# Patient Record
Sex: FEMALE | Race: WHITE | NOT HISPANIC OR LATINO | Employment: UNEMPLOYED | URBAN - NONMETROPOLITAN AREA
[De-identification: names, ages, dates, MRNs, and addresses within clinical notes are randomized per-mention and may not be internally consistent; named-entity substitution may affect disease eponyms.]

---

## 2021-12-22 ENCOUNTER — OFFICE VISIT (OUTPATIENT)
Dept: OBSTETRICS AND GYNECOLOGY | Facility: CLINIC | Age: 31
End: 2021-12-22

## 2021-12-22 VITALS
SYSTOLIC BLOOD PRESSURE: 102 MMHG | HEIGHT: 64 IN | BODY MASS INDEX: 25.27 KG/M2 | DIASTOLIC BLOOD PRESSURE: 68 MMHG | WEIGHT: 148 LBS

## 2021-12-22 DIAGNOSIS — E66.3 OVERWEIGHT (BMI 25.0-29.9): ICD-10-CM

## 2021-12-22 DIAGNOSIS — Z78.9 NON-SMOKER: ICD-10-CM

## 2021-12-22 DIAGNOSIS — Z01.419 ENCOUNTER FOR GYNECOLOGICAL EXAMINATION WITHOUT ABNORMAL FINDING: Primary | ICD-10-CM

## 2021-12-22 PROCEDURE — 87624 HPV HI-RISK TYP POOLED RSLT: CPT | Performed by: NURSE PRACTITIONER

## 2021-12-22 PROCEDURE — 99385 PREV VISIT NEW AGE 18-39: CPT | Performed by: NURSE PRACTITIONER

## 2021-12-22 PROCEDURE — G0123 SCREEN CERV/VAG THIN LAYER: HCPCS | Performed by: NURSE PRACTITIONER

## 2021-12-22 RX ORDER — AZITHROMYCIN 250 MG/1
250 TABLET, FILM COATED ORAL DAILY
COMMUNITY
End: 2022-12-22 | Stop reason: HOSPADM

## 2021-12-22 RX ORDER — NEOMYCIN SULFATE, POLYMYXIN B SULFATE AND HYDROCORTISONE 10; 3.5; 1 MG/ML; MG/ML; [USP'U]/ML
SUSPENSION/ DROPS AURICULAR (OTIC)
COMMUNITY
End: 2022-12-22 | Stop reason: HOSPADM

## 2021-12-22 NOTE — PATIENT INSTRUCTIONS
"BMI for Adults  What is BMI?  Body mass index (BMI) is a number that is calculated from a person's weight and height. BMI can help estimate how much of a person's weight is composed of fat. BMI does not measure body fat directly. Rather, it is an alternative to procedures that directly measure body fat, which can be difficult and expensive.  BMI can help identify people who may be at higher risk for certain medical problems.  What are BMI measurements used for?  BMI is used as a screening tool to identify possible weight problems. It helps determine whether a person is obese, overweight, a healthy weight, or underweight.  BMI is useful for:  · Identifying a weight problem that may be related to a medical condition or may increase the risk for medical problems.  · Promoting changes, such as changes in diet and exercise, to help reach a healthy weight. BMI screening can be repeated to see if these changes are working.  How is BMI calculated?  BMI involves measuring your weight in relation to your height. Both height and weight are measured, and the BMI is calculated from those numbers. This can be done either in English (U.S[place] or metric measurements. Note that charts and online BMI calculators are available to help you find your BMI quickly and easily without having to do these calculations yourself.  To calculate your BMI in English (U.S.) measurements:    1. Measure your weight in pounds (lb).  2. Multiply the number of pounds by 703.  ? For example, for a person who weighs 180 lb, multiply that number by 703, which equals 126,540.  3. Measure your height in inches. Then multiply that number by itself to get a measurement called \"inches squared.\"  ? For example, for a person who is 70 inches tall, the \"inches squared\" measurement is 70 inches x 70 inches, which equals 4,900 inches squared.  4. Divide the total from step 2 (number of lb x 703) by the total from step 3 (inches squared): 126,540 ÷ 4,900 = 25.8. This is " "your BMI.    To calculate your BMI in metric measurements:  1. Measure your weight in kilograms (kg).  2. Measure your height in meters (m). Then multiply that number by itself to get a measurement called \"meters squared.\"  ? For example, for a person who is 1.75 m tall, the \"meters squared\" measurement is 1.75 m x 1.75 m, which is equal to 3.1 meters squared.  3. Divide the number of kilograms (your weight) by the meters squared number. In this example: 70 ÷ 3.1 = 22.6. This is your BMI.  What do the results mean?  BMI charts are used to identify whether you are underweight, normal weight, overweight, or obese. The following guidelines will be used:  · Underweight: BMI less than 18.5.  · Normal weight: BMI between 18.5 and 24.9.  · Overweight: BMI between 25 and 29.9.  · Obese: BMI of 30 or above.  Keep these notes in mind:  · Weight includes both fat and muscle, so someone with a muscular build, such as an athlete, may have a BMI that is higher than 24.9. In cases like these, BMI is not an accurate measure of body fat.  · To determine if excess body fat is the cause of a BMI of 25 or higher, further assessments may need to be done by a health care provider.  · BMI is usually interpreted in the same way for men and women.  Where to find more information  For more information about BMI, including tools to quickly calculate your BMI, go to these websites:  · Centers for Disease Control and Prevention: www.cdc.gov  · American Heart Association: www.heart.org  · National Heart, Lung, and Blood [employer]: www.nhlbi.nih.gov  Summary  · Body mass index (BMI) is a number that is calculated from a person's weight and height.  · BMI may help estimate how much of a person's weight is composed of fat. BMI can help identify those who may be at higher risk for certain medical problems.  · BMI can be measured using English measurements or metric measurements.  · BMI charts are used to identify whether you are underweight, normal " weight, overweight, or obese.  This information is not intended to replace advice given to you by your health care provider. Make sure you discuss any questions you have with your health care provider.  Document Revised: 09/09/2020 Document Reviewed: 07/17/2020  Elsevier Patient Education © 2021 [employer] Inc.

## 2021-12-22 NOTE — PROGRESS NOTES
"Subjective   Cassandra Del Cid is a 31 y.o. female.     Annual exam      The following portions of the patient's history were reviewed and updated as appropriate: allergies, current medications, past family history, past medical history, past social history, past surgical history and problem list.    /68   Ht 162.6 cm (64\")   Wt 67.1 kg (148 lb)   LMP 11/18/2021   BMI 25.40 kg/m²     Review of Systems   Constitutional: Negative for activity change, appetite change, fatigue and fever.   HENT: Negative for congestion, sore throat and trouble swallowing.    Eyes: Negative for pain, discharge and visual disturbance.   Respiratory: Negative for apnea, shortness of breath and wheezing.    Cardiovascular: Negative for chest pain, palpitations and leg swelling.   Gastrointestinal: Negative for abdominal pain, constipation and diarrhea.   Genitourinary: Negative for frequency, menstrual problem, pelvic pain, urgency and vaginal discharge.        Monthly  5 day, 3 heavy  No cramping or clotting.    Musculoskeletal: Negative for back pain and gait problem.   Skin: Negative for color change and rash.   Neurological: Negative for dizziness, weakness and numbness.   Psychiatric/Behavioral: Negative for confusion and sleep disturbance.       Objective   Physical Exam  Vitals and nursing note reviewed. Exam conducted with a chaperone present.   Constitutional:       General: She is not in acute distress.     Appearance: She is well-developed. She is not diaphoretic.   HENT:      Head: Normocephalic.      Right Ear: External ear normal.      Left Ear: External ear normal.      Nose: Nose normal.   Eyes:      General: No scleral icterus.        Right eye: No discharge.         Left eye: No discharge.      Conjunctiva/sclera: Conjunctivae normal.      Pupils: Pupils are equal, round, and reactive to light.   Neck:      Thyroid: No thyromegaly.      Vascular: No carotid bruit.      Trachea: No tracheal deviation. "   Cardiovascular:      Rate and Rhythm: Normal rate and regular rhythm.      Heart sounds: Normal heart sounds. No murmur heard.      Pulmonary:      Effort: Pulmonary effort is normal. No respiratory distress.      Breath sounds: Normal breath sounds. No wheezing.   Chest:   Breasts: Breasts are symmetrical.      Right: Normal. No swelling, bleeding, inverted nipple, mass, nipple discharge, skin change, tenderness, axillary adenopathy or supraclavicular adenopathy.      Left: Normal. No swelling, bleeding, inverted nipple, mass, nipple discharge, skin change, tenderness, axillary adenopathy or supraclavicular adenopathy.       Abdominal:      General: There is no distension.      Palpations: Abdomen is soft. There is no mass.      Tenderness: There is no abdominal tenderness. There is no right CVA tenderness, left CVA tenderness or guarding.      Hernia: No hernia is present. There is no hernia in the left inguinal area or right inguinal area.   Genitourinary:     General: Normal vulva.      Exam position: Lithotomy position.      Labia:         Right: No rash, tenderness, lesion or injury.         Left: No rash, tenderness, lesion or injury.       Vagina: Normal. No signs of injury and foreign body. No vaginal discharge, erythema, tenderness or bleeding.      Cervix: Normal.      Uterus: Normal. Not enlarged, not fixed and not tender.       Adnexa: Right adnexa normal and left adnexa normal.        Right: No mass, tenderness or fullness.          Left: No mass, tenderness or fullness.        Rectum: Normal. No mass.      Comments:   BSU normal  Urethral meatus  Normal  Perineum  Normal  Musculoskeletal:         General: No tenderness. Normal range of motion.      Cervical back: Normal range of motion and neck supple.   Lymphadenopathy:      Head:      Right side of head: No submental, submandibular, tonsillar, preauricular, posterior auricular or occipital adenopathy.      Left side of head: No submental,  submandibular, tonsillar, preauricular, posterior auricular or occipital adenopathy.      Cervical: No cervical adenopathy.      Right cervical: No superficial, deep or posterior cervical adenopathy.     Left cervical: No superficial, deep or posterior cervical adenopathy.      Upper Body:      Right upper body: No supraclavicular, axillary or pectoral adenopathy.      Left upper body: No supraclavicular, axillary or pectoral adenopathy.      Lower Body: No right inguinal adenopathy. No left inguinal adenopathy.   Skin:     General: Skin is warm and dry.      Findings: No bruising, erythema or rash.   Neurological:      Mental Status: She is alert and oriented to person, place, and time.      Coordination: Coordination normal.   Psychiatric:         Mood and Affect: Mood normal.         Behavior: Behavior normal.         Thought Content: Thought content normal.         Judgment: Judgment normal.         Assessment/Plan   Well woman GYN exam.   Pap smear done per ASCCP guidelines.   Will have lab work at PCP.     Encouraged SBE, pt is aware how to do self breast exam and the importance of same.   Discussed weight management and importance of maintaining a healthy weight.   Discussed Vitamin D intake and the importance of adequate vitamin D for both Bone Health and a healthy immune system.    Discussed Daily exercise and the importance of same, in regards to a healthy heart as well as helping to maintain her weight and improving her mental health.     Discussed STD prevention and testing.   Pt declines STD testing.     Desires pregnancy  Start PNV.   Discussed menstrual cycles.   Pt advised to call with any questions or concerns.   Discussed S&S to report. Pt voiced understanding.       Patient's Body mass index is 25.4 kg/m². indicating that she is overweight (BMI 25-29.9). Obesity-related health conditions include the following: none. Obesity is unchanged. BMI is is above average; no BMI management plan is  appropriate. We discussed portion control and increasing exercise..    RV annual exam/prn.   Diagnoses and all orders for this visit:    1. Encounter for gynecological examination without abnormal finding (Primary)  -     Liquid-based Pap Smear, Screening    2. Non-smoker    3. Overweight (BMI 25.0-29.9)

## 2021-12-23 LAB
GEN CATEG CVX/VAG CYTO-IMP: NORMAL
HPV I/H RISK 4 DNA CVX QL PROBE+SIG AMP: NOT DETECTED
LAB AP CASE REPORT: NORMAL
LAB AP GYN ADDITIONAL INFORMATION: NORMAL
LAB AP GYN OTHER FINDINGS: NORMAL
PATH INTERP SPEC-IMP: NORMAL
STAT OF ADQ CVX/VAG CYTO-IMP: NORMAL

## 2022-07-12 ENCOUNTER — OFFICE VISIT (OUTPATIENT)
Dept: OBGYN CLINIC | Age: 32
End: 2022-07-12
Payer: COMMERCIAL

## 2022-07-12 VITALS
HEART RATE: 81 BPM | DIASTOLIC BLOOD PRESSURE: 72 MMHG | SYSTOLIC BLOOD PRESSURE: 109 MMHG | BODY MASS INDEX: 27.29 KG/M2 | WEIGHT: 154 LBS | HEIGHT: 63 IN

## 2022-07-12 DIAGNOSIS — Z76.89 ENCOUNTER TO ESTABLISH CARE: Primary | ICD-10-CM

## 2022-07-12 DIAGNOSIS — Z36.89 CONFIRM FETAL CARDIAC ACTIVITY USING ULTRASOUND: ICD-10-CM

## 2022-07-12 DIAGNOSIS — Z34.90 PREGNANCY, UNSPECIFIED GESTATIONAL AGE: ICD-10-CM

## 2022-07-12 PROCEDURE — 99203 OFFICE O/P NEW LOW 30 MIN: CPT | Performed by: NURSE PRACTITIONER

## 2022-07-12 ASSESSMENT — ENCOUNTER SYMPTOMS
EYES NEGATIVE: 1
GASTROINTESTINAL NEGATIVE: 1
RESPIRATORY NEGATIVE: 1

## 2022-07-12 NOTE — PROGRESS NOTES
Terrie Avendano is a 36 y.o. female who presents today for her medical conditions/ complaints as noted below. Terrie Avendano is c/o of New Patient and Confirmation        HPI  Pt presents today as a new patient for pregnancy confirmation. Third pregnancy, no preference on delivery   Positive pregnancy test in office. This is her 3rd pregnancy. She had 2 vaginal deliveries and no complications in the pregnancy but with her second had retained placenta and \"hemorrhaged\". Current on her pap. Patient's last menstrual period was 05/26/2022. [id number]    History reviewed. No pertinent past medical history. Past Surgical History:   Procedure Laterality Date    APPENDECTOMY  10/14/2011    laparoscopic     Family History   Problem Relation Age of Onset    Diabetes Paternal Grandfather     Cancer Paternal Grandfather         skin cancers     Social History     Tobacco Use    Smoking status: Never Smoker    Smokeless tobacco: Never Used   Substance Use Topics    Alcohol use: No       No current outpatient medications on file. No current facility-administered medications for this visit. Allergies   Allergen Reactions    Penicillins Nausea And Vomiting     Vitals:    07/12/22 0912   BP: 109/72   Pulse: 81     Body mass index is 27.28 kg/m². Review of Systems   Constitutional: Negative. HENT: Negative. Eyes: Negative. Respiratory: Negative. Cardiovascular: Negative. Gastrointestinal: Negative. Genitourinary: Negative for difficulty urinating, dyspareunia, dysuria, enuresis, frequency, hematuria, menstrual problem, pelvic pain, urgency and vaginal discharge. Musculoskeletal: Negative. Skin: Negative. Neurological: Negative. Psychiatric/Behavioral: Negative. Physical Exam  Vitals and nursing note reviewed. Constitutional:       General: She is not in acute distress. Appearance: She is well-developed. She is not diaphoretic.    HENT:      Head: Normocephalic and atraumatic. Eyes:      Conjunctiva/sclera: Conjunctivae normal.      Pupils: Pupils are equal, round, and reactive to light. Pulmonary:      Effort: Pulmonary effort is normal.   Abdominal:      Tenderness: There is no guarding. Musculoskeletal:         General: Normal range of motion. Cervical back: Normal range of motion. Comments: Normal ROM in all 4 extremities; normal gait   Skin:     General: Skin is warm and dry. Neurological:      Mental Status: She is alert and oriented to person, place, and time. Motor: No abnormal muscle tone. Coordination: Coordination normal.   Psychiatric:         Behavior: Behavior normal.          Diagnosis Orders   1. Encounter to establish care     2. Pregnancy, unspecified gestational age     1. Confirm fetal cardiac activity using ultrasound  US OB TRANSVAGINAL       MEDICATIONS:  No orders of the defined types were placed in this encounter. ORDERS:  Orders Placed This Encounter   Procedures    US OB TRANSVAGINAL       PLAN:  Pregnancy recommendations discussed  Will set up US and nob in 1-2 weeks  She is on pnv  Plan of care was discussed with patient. Patient was encouraged to adhere to a well-balanced diet, including increasing water intake and limiting excessive caffeine and salt. The benefits of exercise were discussed; however she was advised against heavy lifting, sit-ups and abdominal crunches. A list of safe OTC medications was provided and discussed. The patient was cautioned against the use of tanning beds, hot tubs, saunas, and x-rays. Avoidance of tobacco, alcohol and illicit drugs was also discussed due to harmful effects on the fetus and increased risks associated with pregnancy. Certain labs and ultrasounds are required at certain times during pregnancy but others are optional, including the serum integrated screen/[place]/AFP/ Panorama, and other genetic testing.   The patient was encouraged to attend childbirth classes and general hospital information was provided based on patients hospital of choice. Over 50% of the total visit time of 35 minutes was spent on counseling and/or coordination of care. All questions were answered and the patient voiced understanding. There are no Patient Instructions on file for this visit.

## 2022-07-21 ENCOUNTER — HOSPITAL ENCOUNTER (OUTPATIENT)
Dept: ULTRASOUND IMAGING | Age: 32
Discharge: HOME | End: 2022-07-21
Payer: COMMERCIAL

## 2022-07-21 DIAGNOSIS — Z36.89 CONFIRM FETAL CARDIAC ACTIVITY USING ULTRASOUND: ICD-10-CM

## 2022-07-21 PROCEDURE — 76817 TRANSVAGINAL US OBSTETRIC: CPT | Performed by: RADIOLOGY

## 2022-07-21 PROCEDURE — 76817 TRANSVAGINAL US OBSTETRIC: CPT

## 2022-07-27 ENCOUNTER — INITIAL PRENATAL (OUTPATIENT)
Dept: OBGYN CLINIC | Age: 32
End: 2022-07-27

## 2022-07-27 VITALS
SYSTOLIC BLOOD PRESSURE: 106 MMHG | WEIGHT: 153 LBS | HEART RATE: 76 BPM | DIASTOLIC BLOOD PRESSURE: 70 MMHG | BODY MASS INDEX: 27.1 KG/M2

## 2022-07-27 DIAGNOSIS — Z36.9 ANTENATAL SCREENING ENCOUNTER: ICD-10-CM

## 2022-07-27 DIAGNOSIS — Z3A.09 9 WEEKS GESTATION OF PREGNANCY: ICD-10-CM

## 2022-07-27 DIAGNOSIS — Z3A.09 9 WEEKS GESTATION OF PREGNANCY: Primary | ICD-10-CM

## 2022-07-27 DIAGNOSIS — Z34.80 SUPERVISION OF OTHER NORMAL PREGNANCY: ICD-10-CM

## 2022-07-27 DIAGNOSIS — O21.9 NAUSEA AND VOMITING IN PREGNANCY: ICD-10-CM

## 2022-07-27 LAB
ABO/RH: NORMAL
ANTIBODY IDENTIFICATION: NORMAL
ANTIBODY SCREEN: NORMAL
HEPATITIS C ANTIBODY INTERPRETATION: NORMAL

## 2022-07-27 PROCEDURE — 0500F INITIAL PRENATAL CARE VISIT: CPT | Performed by: NURSE PRACTITIONER

## 2022-07-27 RX ORDER — ONDANSETRON 4 MG/1
4 TABLET, ORALLY DISINTEGRATING ORAL EVERY 8 HOURS PRN
Qty: 45 TABLET | Refills: 3 | Status: SHIPPED | OUTPATIENT
Start: 2022-07-27

## 2022-07-27 NOTE — PROGRESS NOTES
Patient presents today for initial ob visit. Pt denies any vaginal leaking bleeding or contractions. Patient never got zofran rx, asking for it to be sent to [facility]  S:G[facility] So Doctor is here for a new obstetrical visit. Today she is 9w4d weeks EGA. She is doing well but is having some nausea. This is her 3rd pregnancy. Will be interested in [facility]. Dating confirmed. She  does not have vaginal bleeding, leaking of fluid, contractions. She does not have blurred vision, SOB, or increased swelling in legs or face. Pt does not feel fetal movement regularly. O:   Vitals:    22 1358   BP: 106/70   Pulse: 76   Weight: 153 lb (69.4 kg)     Pt is A&Ox3, in no acute distress. Normocephalic, atraumatic. PERRL. Resp even and non-labored. Skin pink, warm & dry. Gravid abdomen. GUILLAUME's well. Gait steady. See OB flowsheet. A: Normal IUP at 9w4d wks      Diagnosis Orders   1. 9 weeks gestation of pregnancy  HIV Obstetric Panel    Hemoglobinopathy Evaluation    Culture, Urine    Varicella Zoster Antibody, IgG    Hepatitis C Antibody    Chlamydia/N. Gonorrhoeae/T. Vaginalis      2. Supervision of other normal pregnancy        3.  screening encounter  HIV Obstetric Panel    Hemoglobinopathy Evaluation    Culture, Urine    Varicella Zoster Antibody, IgG    Hepatitis C Antibody    Chlamydia/N. Gonorrhoeae/T. Vaginalis      4. Nausea and vomiting in pregnancy  ondansetron (ZOFRAN ODT) 4 MG disintegrating tablet          P:   Pt counseled on pregnancy recommendations and Genetic testing   Plan of care was discussed with patient. Patient was encouraged to adhere to a well-balanced diet, including increasing water intake and limiting excessive caffeine and salt. The benefits of exercise were discussed; however she was advised against heavy lifting, sit-ups and abdominal crunches. A list of safe OTC medications was provided and discussed.   The patient was cautioned against the use of tanning beds, hot tubs, saunas, and x-rays. Avoidance of tobacco, alcohol and illicit drugs was also discussed due to harmful effects on the fetus and increased risks associated with pregnancy. Certain labs and ultrasounds are required at certain times during pregnancy but others are optional, including the serum integrated screen/[place]/AFP/ Panorama, and other genetic testing. The patient was encouraged to attend childbirth classes and general hospital information was provided based on patients hospital of choice. Continue with routine prenatal care. RTC in 4 wk for prenatal visit    MEDICATIONS:  Orders Placed This Encounter   Medications    ondansetron (ZOFRAN ODT) 4 MG disintegrating tablet     Sig: Take 1 tablet by mouth every 8 hours as needed for Nausea or Vomiting     Dispense:  45 tablet     Refill:  3       ORDERS:  Orders Placed This Encounter   Procedures    Culture, Urine    Chlamydia/N. Gonorrhoeae/T. Vaginalis    HIV Obstetric Panel    Hemoglobinopathy Evaluation    Varicella Zoster Antibody, IgG    Hepatitis C Antibody

## 2022-07-28 LAB
BASOPHILS ABSOLUTE: 0 K/UL (ref 0–0.2)
BASOPHILS RELATIVE PERCENT: 0.4 % (ref 0–1)
C. TRACHOMATIS DNA ,URINE: NOT DETECTED
EOSINOPHILS ABSOLUTE: 0 K/UL (ref 0–0.6)
EOSINOPHILS RELATIVE PERCENT: 0.4 % (ref 0–5)
HCT VFR BLD CALC: 39.1 % (ref 37–47)
HEMOGLOBIN: 12.9 G/DL (ref 12–16)
HEPATITIS B SURFACE ANTIGEN INTERPRETATION: ABNORMAL
HIV-1 P24 AG: ABNORMAL
IMMATURE GRANULOCYTES #: 0 K/UL
K ANTIGEN: NORMAL
LYMPHOCYTES ABSOLUTE: 1.3 K/UL (ref 1.1–4.5)
LYMPHOCYTES RELATIVE PERCENT: 11.4 % (ref 20–40)
MCH RBC QN AUTO: 32.3 PG (ref 27–31)
MCHC RBC AUTO-ENTMCNC: 33 G/DL (ref 33–37)
MCV RBC AUTO: 98 FL (ref 81–99)
MONOCYTES ABSOLUTE: 0.7 K/UL (ref 0–0.9)
MONOCYTES RELATIVE PERCENT: 5.8 % (ref 0–10)
N. GONORRHOEAE DNA, URINE: NOT DETECTED
NEUTROPHILS ABSOLUTE: 9.3 K/UL (ref 1.5–7.5)
NEUTROPHILS RELATIVE PERCENT: 81.6 % (ref 50–65)
PDW BLD-RTO: 11.8 % (ref 11.5–14.5)
PLATELET # BLD: 268 K/UL (ref 130–400)
PMV BLD AUTO: 10.1 FL (ref 9.4–12.3)
RAPID HIV 1&2: ABNORMAL
RBC # BLD: 3.99 M/UL (ref 4.2–5.4)
RPR: ABNORMAL
RUBELLA ANTIBODY IGG: REACTIVE
TRICHOMONAS VAGINALIS DNA, URINE: NOT DETECTED
WBC # BLD: 11.3 K/UL (ref 4.8–10.8)

## 2022-07-29 LAB — URINE CULTURE, ROUTINE: NORMAL

## 2022-07-30 LAB
HEMOGLOBIN A-1 QUANTITATION: 95.9 % (ref 95–97.9)
HEMOGLOBIN A2 QUANTITATION: 3.5 % (ref 2–3.5)
HEMOGLOBIN C QUANTITATION: 0 % (ref 0–0)
HEMOGLOBIN E QUANTITATION: 0 % (ref 0–0)
HEMOGLOBIN ELECTROPHORESIS: NORMAL
HEMOGLOBIN EVALUATION: NORMAL
HEMOGLOBIN F QUANTITATION: 0.6 % (ref 0–2.1)
HEMOGLOBIN OTHER: 0 % (ref 0–0)
HEMOGLOBIN S QUANTITATION: 0 % (ref 0–0)
SICKLE CELL: NORMAL

## 2022-08-01 DIAGNOSIS — O36.1910 KELL ISOIMMUNIZATION DURING PREGNANCY IN FIRST TRIMESTER, SINGLE OR UNSPECIFIED FETUS: ICD-10-CM

## 2022-08-01 DIAGNOSIS — O36.1910 MATERNAL ATYPICAL ANTIBODY AFFECTING PREGNANCY IN FIRST TRIMESTER, SINGLE OR UNSPECIFIED FETUS: Primary | ICD-10-CM

## 2022-08-01 LAB — VZV IGG SER QL IA: 3.16

## 2022-08-02 ENCOUNTER — ROUTINE PRENATAL (OUTPATIENT)
Dept: OBGYN CLINIC | Age: 32
End: 2022-08-02

## 2022-08-02 VITALS
BODY MASS INDEX: 27.63 KG/M2 | WEIGHT: 156 LBS | HEART RATE: 88 BPM | DIASTOLIC BLOOD PRESSURE: 66 MMHG | SYSTOLIC BLOOD PRESSURE: 126 MMHG

## 2022-08-02 DIAGNOSIS — Z36.9 ANTENATAL SCREENING ENCOUNTER: ICD-10-CM

## 2022-08-02 DIAGNOSIS — O36.1910 MATERNAL ATYPICAL ANTIBODY AFFECTING PREGNANCY IN FIRST TRIMESTER, SINGLE OR UNSPECIFIED FETUS: ICD-10-CM

## 2022-08-02 DIAGNOSIS — O36.1910 KELL ISOIMMUNIZATION DURING PREGNANCY IN FIRST TRIMESTER, SINGLE OR UNSPECIFIED FETUS: ICD-10-CM

## 2022-08-02 DIAGNOSIS — Z3A.10 10 WEEKS GESTATION OF PREGNANCY: Primary | ICD-10-CM

## 2022-08-02 PROCEDURE — 0502F SUBSEQUENT PRENATAL CARE: CPT | Performed by: NURSE PRACTITIONER

## 2022-08-02 NOTE — PATIENT INSTRUCTIONS
Patient Education        Weeks 10 to 14 of Your Pregnancy: Care Instructions  Overview     By weeks 10 to 15 of your pregnancy, the placenta has formed inside your uterus. The placenta's main job is to give your baby oxygen and nutrientsthrough the umbilical cord. It's possible to hear your baby's heartbeat with a special ultrasound device. Your baby's organs are developing. The arms and legs can bend. This is a good time to think about testing for birth defects. There are two types of tests: screening and diagnostic. Screening tests show the chance that a baby has a certain birth defect. They can't tell you for sure that your babyhas a problem. Diagnostic tests show if a baby has a certain birth defect. It's your choice whether to have these tests. You and your partner can talk toyour doctor or midwife about tests for birth defects. Follow-up care is a key part of your treatment and safety. Be sure to make and go to all appointments, and call your doctor if you are having problems. It's also a good idea to know your test results and keep alist of the medicines you take. How can you care for yourself at home? Decide about tests  You can have screening tests and diagnostic tests to check for birth defects. The decision to have a test for birth defects is personal. Think about your age, your chance of passing on a family disease, your need to know about any problems, and what you might do after you have the test results. Quadruple (quad) blood test. This screening test can be done between 15 and 22 weeks of pregnancy. It checks the amount of four substances in your blood. The doctor looks at these test results, along with your age and other factors, to find out the chance that your baby may have certain problems. Amniocentesis. This diagnostic test is used to look for chromosomal problems in the baby's cells. It can be done between 15 and 20 weeks of pregnancy, usually around week 16.   Nuchal translucency test. This test uses ultrasound to measure the thickness of the area at the back of the baby's neck. An increase in the thickness can be an early sign of Down syndrome. Chorionic villus sampling (CVS). This is a test that looks for certain genetic problems with your baby. The same genes that are in your baby are in the placenta. A small piece of the placenta is taken out and tested. This test is done when you are 10 to 13 weeks pregnant. Ease discomfort  Slow down and take naps when you feel tired. If your emotions swing, talk to someone. If your gums bleed, try a softer toothbrush. If your gums are puffy and bleed a lot, see your dentist.  If you feel dizzy:  Get up slowly after sitting or lying down. Drink plenty of fluids. Eat small snacks to keep your blood sugar stable. Put your head between your legs as though you were tying your shoelaces. Lie down with your legs higher than your head. Use pillows to prop up your feet. If you have a headache:  Lie down. Ask your partner or a good friend for a neck massage. Try cool cloths over your forehead or across the back of your neck. Use acetaminophen (Tylenol) for pain relief. Do not use nonsteroidal anti-inflammatory drugs (NSAIDs), such as ibuprofen (Advil, Motrin) or naproxen (Aleve), unless your doctor says it is okay. If you have a nosebleed, pinch your nose gently, and hold it for a short while. To prevent nosebleeds, try massaging a small dab of petroleum jelly, such as Vaseline, in your nostrils. If your nose is stuffed up, try saline (saltwater) nose sprays. Do not use decongestant sprays. Care for your breasts  Wear a bra that gives you good support. Know that changes in your breasts are normal.  Your breasts may get larger and more tender. Tenderness usually gets better by 12 weeks. Your nipples may get darker and larger, and small bumps around your nipples may show more. The veins in your chest and breasts may show more.   Where can you learn more?  Go to [email or web address] and sign in to your [employer] account. Enter E977 in the [facility] box to learn more about \"Weeks 10 to 14 of Your Pregnancy: Care Instructions. \"     If you do not have an account, please click on the \"Sign Up Now\" link. Current as of: February 23, 2022               Content Version: 13.3  © 0513-0132 Healthwise, [employer]. Care instructions adapted under license by [facility] ([facility]). If you have questions about a medical condition or this instruction, always ask your healthcare professional. [address] any warranty or liability for your use of this information.

## 2022-08-05 ENCOUNTER — HOSPITAL ENCOUNTER (EMERGENCY)
Age: 32
Discharge: HOME | End: 2022-08-06
Attending: EMERGENCY MEDICINE
Payer: COMMERCIAL

## 2022-08-05 VITALS
BODY MASS INDEX: 27.64 KG/M2 | WEIGHT: 156 LBS | RESPIRATION RATE: 20 BRPM | TEMPERATURE: 98.4 F | DIASTOLIC BLOOD PRESSURE: 85 MMHG | HEART RATE: 81 BPM | HEIGHT: 63 IN | OXYGEN SATURATION: 99 % | SYSTOLIC BLOOD PRESSURE: 134 MMHG

## 2022-08-05 DIAGNOSIS — O20.0 THREATENED MISCARRIAGE: Primary | ICD-10-CM

## 2022-08-05 LAB — AB TITER: 1

## 2022-08-05 PROCEDURE — 99284 EMERGENCY DEPT VISIT MOD MDM: CPT

## 2022-08-05 ASSESSMENT — PAIN SCALES - GENERAL: PAINLEVEL_OUTOF10: 1

## 2022-08-05 ASSESSMENT — PAIN - FUNCTIONAL ASSESSMENT: PAIN_FUNCTIONAL_ASSESSMENT: 0-10

## 2022-08-05 ASSESSMENT — PAIN DESCRIPTION - DESCRIPTORS: DESCRIPTORS: PRESSURE

## 2022-08-05 ASSESSMENT — PAIN DESCRIPTION - ORIENTATION: ORIENTATION: LEFT;LOWER

## 2022-08-05 ASSESSMENT — PAIN DESCRIPTION - LOCATION: LOCATION: ABDOMEN

## 2022-08-06 LAB
ABO/RH: NORMAL
ANTIBODY SCREEN: NORMAL
BACTERIA: ABNORMAL /HPF
BILIRUBIN URINE: NEGATIVE
BILIRUBIN URINE: NEGATIVE
BLOOD, URINE: ABNORMAL
BLOOD, URINE: NEGATIVE
CLARITY: ABNORMAL
CLARITY: CLEAR
COLOR: YELLOW
COLOR: YELLOW
EPITHELIAL CELLS, UA: ABNORMAL /HPF
GLUCOSE URINE: NEGATIVE MG/DL
GLUCOSE URINE: NEGATIVE MG/DL
GONADOTROPIN, CHORIONIC (HCG) QUANT: ABNORMAL MIU/ML (ref 0–5.3)
HCT VFR BLD CALC: 37.5 % (ref 37–47)
HEMOGLOBIN: 12.8 G/DL (ref 12–16)
KETONES, URINE: NEGATIVE MG/DL
KETONES, URINE: NEGATIVE MG/DL
LEUKOCYTE ESTERASE, URINE: ABNORMAL
LEUKOCYTE ESTERASE, URINE: NEGATIVE
MCH RBC QN AUTO: 32.7 PG (ref 27–31)
MCHC RBC AUTO-ENTMCNC: 34.1 G/DL (ref 33–37)
MCV RBC AUTO: 95.9 FL (ref 81–99)
NITRITE, URINE: NEGATIVE
NITRITE, URINE: NEGATIVE
PDW BLD-RTO: 11.9 % (ref 11.5–14.5)
PH UA: 5.5 (ref 5–8)
PH UA: 6 (ref 5–8)
PLATELET # BLD: 256 K/UL (ref 130–400)
PMV BLD AUTO: 10 FL (ref 9.4–12.3)
PROTEIN UA: ABNORMAL MG/DL
PROTEIN UA: NEGATIVE MG/DL
RBC # BLD: 3.91 M/UL (ref 4.2–5.4)
RBC UA: ABNORMAL /HPF (ref 0–2)
RHIG LOT NUMBER: NORMAL
SPECIFIC GRAVITY UA: 1.01 (ref 1–1.03)
SPECIFIC GRAVITY UA: 1.02 (ref 1–1.03)
UROBILINOGEN, URINE: 1 E.U./DL
UROBILINOGEN, URINE: 1 E.U./DL
WBC # BLD: 11.8 K/UL (ref 4.8–10.8)
WBC UA: ABNORMAL /HPF (ref 0–5)

## 2022-08-06 PROCEDURE — 86850 RBC ANTIBODY SCREEN: CPT

## 2022-08-06 PROCEDURE — 81003 URINALYSIS AUTO W/O SCOPE: CPT

## 2022-08-06 PROCEDURE — 81001 URINALYSIS AUTO W/SCOPE: CPT

## 2022-08-06 PROCEDURE — 96372 THER/PROPH/DIAG INJ SC/IM: CPT

## 2022-08-06 PROCEDURE — 6360000002 HC RX W HCPCS: Performed by: EMERGENCY MEDICINE

## 2022-08-06 PROCEDURE — 36415 COLL VENOUS BLD VENIPUNCTURE: CPT

## 2022-08-06 PROCEDURE — 84702 CHORIONIC GONADOTROPIN TEST: CPT

## 2022-08-06 PROCEDURE — 86900 BLOOD TYPING SEROLOGIC ABO: CPT

## 2022-08-06 PROCEDURE — 87086 URINE CULTURE/COLONY COUNT: CPT

## 2022-08-06 PROCEDURE — 86901 BLOOD TYPING SEROLOGIC RH(D): CPT

## 2022-08-06 PROCEDURE — 85027 COMPLETE CBC AUTOMATED: CPT

## 2022-08-06 RX ADMIN — HUMAN RHO(D) IMMUNE GLOBULIN 300 MCG: 300 INJECTION, SOLUTION INTRAMUSCULAR at 02:39

## 2022-08-06 ASSESSMENT — ENCOUNTER SYMPTOMS
SHORTNESS OF BREATH: 0
ABDOMINAL PAIN: 0
EYE PAIN: 0
DIARRHEA: 0
VOMITING: 0

## 2022-08-06 NOTE — ED PROVIDER NOTES
[facility]  eMERGENCY dEPARTMENT eNCOUnter      Pt Name: Washington Kraft  MRN: [id number]  [place] 1990  Date of evaluation: 8/5/2022  Provider: Angelica Rios MD    CHIEF COMPLAINT       Chief Complaint   Patient presents with    Vaginal Bleeding     11 weeks pregnant. Brown discharge last 3 days. Talked to OBgyn and was told to monitor it. About 2 hours ago, looked bloody. Happens when urinating. HISTORY OF PRESENT ILLNESS   (Location/Symptom, Timing/Onset,Context/Setting, Quality, Duration, Modifying Factors, Severity)  Note limiting factors. Washington Kraft is a 36 y.o. female who presents to the emergency department vaginal bleeding. Patient is approximately 11 weeks pregnant. This is her third pregnancy. Has had no problems with prior pregnancies or her current pregnancy. She has had some brownish vaginal discharge for the past few days. This had stopped but then today noticed some mild vaginal bleeding. No passage of tissue or clots. Had some very minimal cramping. No pain at this time. No urinary symptoms. No nausea or vomiting. No pain of any kind currently. No gush of fluid. Had transvaginal ultrasound on 7/21/2022 that showed viable first trimester IUP as well as subchorionic hemorrhage measuring 2.6 x 1.9 x 1.5 cm. HPI    NursingNotes were reviewed. REVIEW OF SYSTEMS    (2-9 systems for level 4, 10 or more for level 5)     Review of Systems   Constitutional:  Negative for fever. Eyes:  Negative for pain. Respiratory:  Negative for shortness of breath. Cardiovascular:  Negative for chest pain and palpitations. Gastrointestinal:  Negative for abdominal pain, diarrhea and vomiting. Genitourinary:  Positive for pelvic pain (very mild cramping. no pain currently) and vaginal bleeding (scant). Negative for dysuria, vaginal discharge and vaginal pain. Skin:  Negative for rash. Neurological:  Negative for weakness and headaches.    All other systems reviewed and are negative. A complete review of systems was performed and is negative except as noted above in the HPI. PAST MEDICAL HISTORY   History reviewed. No pertinent past medical history. SURGICAL HISTORY       Past Surgical History:   Procedure Laterality Date    APPENDECTOMY  10/14/2011    laparoscopic         CURRENT MEDICATIONS       Discharge Medication List as of 8/6/2022  3:32 AM        CONTINUE these medications which have NOT CHANGED    Details   ondansetron (ZOFRAN ODT) 4 MG disintegrating tablet Take 1 tablet by mouth every 8 hours as needed for Nausea or Vomiting, Disp-45 tablet, R-3Normal             ALLERGIES     Penicillins    FAMILY HISTORY       Family History   Problem Relation Age of Onset    Diabetes Paternal Grandfather     Cancer Paternal Grandfather         skin cancers          SOCIAL HISTORY       Social History     Socioeconomic History    Marital status:      Spouse name: None    Number of children: None    Years of education: None    Highest education level: None   Tobacco Use    Smoking status: Never    Smokeless tobacco: Never   Vaping Use    Vaping Use: Never used   Substance and Sexual Activity    Alcohol use: No    Drug use: Never    Sexual activity: Yes       SCREENINGS             PHYSICAL EXAM    (up to 7 for level 4, 8 or more for level 5)     ED Triage Vitals [08/05/22 2306]   BP Temp Temp src Heart Rate Resp SpO2 Height Weight   134/85 98.4 °F (36.9 °C) -- 81 20 99 % 5' 3\" (1.6 m) 156 lb (70.8 kg)       Physical Exam  Vitals reviewed. Exam conducted with a chaperone present. Constitutional:       General: She is not in acute distress. Appearance: She is well-developed. HENT:      Head: Normocephalic and atraumatic. Eyes:      General: No scleral icterus. Pupils: Pupils are equal, round, and reactive to light. Neck:      Vascular: No JVD. Cardiovascular:      Rate and Rhythm: Normal rate and regular rhythm.       Pulses: Normal pulses. Heart sounds: Normal heart sounds. Pulmonary:      Effort: Pulmonary effort is normal. No respiratory distress. Breath sounds: Normal breath sounds. Abdominal:      General: There is no distension. Palpations: Abdomen is soft. Tenderness: There is no abdominal tenderness. There is no guarding or rebound. Genitourinary:     General: Normal vulva. Exam position: Supine. Labia:         Right: No rash, tenderness, lesion or injury. Left: No rash, tenderness, lesion or injury. Cervix: Cervical bleeding (scant amount of dark blood from os) present. No discharge. Musculoskeletal:         General: No tenderness. Cervical back: Normal range of motion and neck supple. Right lower leg: No edema. Left lower leg: No edema. Skin:     General: Skin is warm and dry. Capillary Refill: Capillary refill takes less than 2 seconds. Neurological:      General: No focal deficit present. Mental Status: She is alert and oriented to person, place, and time.    Psychiatric:         Mood and Affect: Mood normal.         Behavior: Behavior normal.       DIAGNOSTIC RESULTS     EKG: All EKG's are interpreted by the Emergency Department Physician who either signs or Co-signs this chart in the absence of a cardiologist.        RADIOLOGY:   Non-plain film images such as CT, Ultrasound and MRI are read by the radiologist. Plainradiographic images are visualized and preliminarily interpreted by the emergency physician with the below findings:        Interpretation per the Radiologist below, if available at the time of this note:    No orders to display         ED BEDSIDE ULTRASOUND:   Performed by ED Physician - none    LABS:  Labs Reviewed   URINALYSIS WITH REFLEX TO CULTURE - Abnormal; Notable for the following components:       Result Value    Clarity, UA CLOUDY (*)     Blood, Urine LARGE (*)     Protein, UA TRACE (*)     Leukocyte Esterase, Urine LARGE (*) All other components within normal limits   CBC - Abnormal; Notable for the following components:    WBC 11.8 (*)     RBC 3.91 (*)     MCH 32.7 (*)     All other components within normal limits   HCG, QUANTITATIVE, PREGNANCY - Abnormal; Notable for the following components:    hCG Quant 342236.0 (*)     All other components within normal limits   MICROSCOPIC URINALYSIS - Abnormal; Notable for the following components:    WBC, UA 16-20 (*)     RBC, UA 21-30 (*)     Bacteria, UA Rare (*)     All other components within normal limits   CULTURE, URINE   RH IMMUNE GLOBULIN   URINALYSIS WITH REFLEX TO CULTURE   TYPE AND SCREEN   RHOGAM INJECTION ONLY       All other labs were within normal range or not returned as of this dictation. EMERGENCY DEPARTMENT COURSE and DIFFERENTIALDIAGNOSIS/MDM:   Vitals:    Vitals:    08/05/22 2306   BP: 134/85   Pulse: 81   Resp: 20   Temp: 98.4 °F (36.9 °C)   SpO2: 99%   Weight: 156 lb (70.8 kg)   Height: 5' 3\" (1.6 m)       MDM  I performed a bedside ultrasound that showed evidence of viable IUP with heart rate of approximately 160. Patient's first urine was a voided specimen. Second specimen was in and out catheterization. I think the findings on the first urinalysis are secondary to contamination given her vaginal bleeding. Patient has no urinary symptoms. Patient Rh-. RhoGAM given. Patient resting comfortably no distress. Nontoxic on exam.  Patient's case discussed with Dr. Markie Chambers who does not see indication for formal ultrasound given the patient had documented IUP seen on ultrasound on 7/21/2022. He is agreeable with plan for patient to be discharged and follow-up on Monday with her OB/GYN, Dr. Jessica Molina. Patient instructed on pelvic rest.  Told her follow-up is very important. Told to return to the ER for change or worsening symptoms or new concerns. Patient agreeable plan.     CONSULTS:  None    PROCEDURES:  Unless otherwise notedbelow, none Procedures    FINAL IMPRESSION     1.  Threatened miscarriage          DISPOSITION/PLAN   DISPOSITION Decision To Discharge 08/06/2022 03:07:47 AM      PATIENT REFERRED TO:  @FUP@    DISCHARGE MEDICATIONS:  Discharge Medication List as of 8/6/2022  3:32 AM             (Please note that portions of this note were completed with a voice recognition program.  Efforts were made to edit the dictations butoccasionally words are mis-transcribed.)    Gilson Fields MD (electronically signed)  AttendingEmergency Physician          Gilson Fields MD  08/06/22 7620

## 2022-08-08 LAB — URINE CULTURE, ROUTINE: NORMAL

## 2022-08-09 DIAGNOSIS — O20.9 BLEEDING IN EARLY PREGNANCY: Primary | ICD-10-CM

## 2022-08-29 ENCOUNTER — ROUTINE PRENATAL (OUTPATIENT)
Dept: OBGYN CLINIC | Age: 32
End: 2022-08-29

## 2022-08-29 VITALS
DIASTOLIC BLOOD PRESSURE: 68 MMHG | HEART RATE: 73 BPM | WEIGHT: 157 LBS | SYSTOLIC BLOOD PRESSURE: 102 MMHG | BODY MASS INDEX: 27.81 KG/M2

## 2022-08-29 DIAGNOSIS — O36.1910 KELL ISOIMMUNIZATION DURING PREGNANCY IN FIRST TRIMESTER, SINGLE OR UNSPECIFIED FETUS: ICD-10-CM

## 2022-08-29 DIAGNOSIS — Z3A.14 14 WEEKS GESTATION OF PREGNANCY: ICD-10-CM

## 2022-08-29 DIAGNOSIS — O21.9 NAUSEA/VOMITING IN PREGNANCY: ICD-10-CM

## 2022-08-29 DIAGNOSIS — Z34.82 NORMAL PREGNANCY IN MULTIGRAVIDA IN SECOND TRIMESTER: Primary | ICD-10-CM

## 2022-08-29 PROCEDURE — 0502F SUBSEQUENT PRENATAL CARE: CPT | Performed by: OBSTETRICS & GYNECOLOGY

## 2022-08-29 NOTE — PROGRESS NOTES
toxic-appearing or diaphoretic. HENT:      Head: Normocephalic and atraumatic. Eyes:      Extraocular Movements: Extraocular movements intact. Pulmonary:      Effort: Pulmonary effort is normal. No respiratory distress. Abdominal:      Palpations: Abdomen is soft. Tenderness: There is no abdominal tenderness. Musculoskeletal:         General: No swelling or tenderness. Normal range of motion. Right lower leg: No edema. Left lower leg: No edema. Skin:     General: Skin is warm and dry. Findings: No rash. Neurological:      Mental Status: She is alert and oriented to person, place, and time. Psychiatric:         Attention and Perception: Attention and perception normal.         Mood and Affect: Mood and affect normal.         Speech: Speech normal.         Behavior: Behavior normal. Behavior is cooperative. Thought Content: Thought content normal.         Cognition and Memory: Cognition and memory normal.         Judgment: Judgment normal.        7/27/22 14:58 8/2/22 10:19 8/5/22 23:54   ABO Rh O NEG  O NEG   Antibody Screen POS  NEG   Ab Titer  1    ANTIBODY TITER  Rpt    Antibody ID POS, Anti-Jessica     K Antigen NEG, 7/27/22 [facility]     Rpt: View report in Results Review for more information      Assessment:   Diagnosis Orders   1. Normal pregnancy in multigravida in second trimester        2. 14 weeks gestation of pregnancy        3. 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 Nausea/vomiting in pregnancy                  Plan:  1. SAB precautions   2. Return in 4 weeks  3. Ultrasound results reviewed. Will continue to monitor for [place]. Pt has an anatomy scan at [facility] on 10-5[facility]22. Rossy Aj, am scribing for and in the presence of Dr. Yumiko Lui.    I, Dr. Yumiko Lui, personally performed the services described in this documentation as scribed by Roc Ferreira in my presence, and it is both accurate and complete.

## 2022-09-26 ENCOUNTER — LAB (OUTPATIENT)
Dept: LAB | Facility: HOSPITAL | Age: 32
End: 2022-09-26

## 2022-09-26 ENCOUNTER — ROUTINE PRENATAL (OUTPATIENT)
Dept: OBGYN CLINIC | Age: 32
End: 2022-09-26

## 2022-09-26 ENCOUNTER — TRANSCRIBE ORDERS (OUTPATIENT)
Dept: ADMINISTRATIVE | Facility: HOSPITAL | Age: 32
End: 2022-09-26

## 2022-09-26 VITALS
SYSTOLIC BLOOD PRESSURE: 94 MMHG | DIASTOLIC BLOOD PRESSURE: 58 MMHG | BODY MASS INDEX: 28.34 KG/M2 | HEART RATE: 73 BPM | WEIGHT: 160 LBS

## 2022-09-26 DIAGNOSIS — O46.8X1 SUBCHORIONIC HEMATOMA IN FIRST TRIMESTER, SINGLE OR UNSPECIFIED FETUS: ICD-10-CM

## 2022-09-26 DIAGNOSIS — O36.1910: Primary | ICD-10-CM

## 2022-09-26 DIAGNOSIS — O46.92 SECOND TRIMESTER BLEEDING: ICD-10-CM

## 2022-09-26 DIAGNOSIS — Z3A.18 18 WEEKS GESTATION OF PREGNANCY: ICD-10-CM

## 2022-09-26 DIAGNOSIS — Z34.82 SUPERVISION OF NORMAL INTRAUTERINE PREGNANCY IN MULTIGRAVIDA, SECOND TRIMESTER: Primary | ICD-10-CM

## 2022-09-26 DIAGNOSIS — O41.8X10 SUBCHORIONIC HEMATOMA IN FIRST TRIMESTER, SINGLE OR UNSPECIFIED FETUS: ICD-10-CM

## 2022-09-26 DIAGNOSIS — O36.1910: ICD-10-CM

## 2022-09-26 LAB
ABO GROUP BLD: NORMAL
ANTI-K: NORMAL
BLD GP AB SCN SERPL QL: POSITIVE
KELL: NEGATIVE
RESIDUAL RHIG DETECTED: NORMAL
RH BLD: NEGATIVE
T&S EXPIRATION DATE: NORMAL

## 2022-09-26 PROCEDURE — 0502F SUBSEQUENT PRENATAL CARE: CPT | Performed by: OBSTETRICS & GYNECOLOGY

## 2022-09-26 PROCEDURE — 36415 COLL VENOUS BLD VENIPUNCTURE: CPT

## 2022-09-26 PROCEDURE — 86886 COOMBS TEST INDIRECT TITER: CPT | Performed by: NURSE PRACTITIONER

## 2022-09-26 PROCEDURE — 86870 RBC ANTIBODY IDENTIFICATION: CPT

## 2022-09-26 PROCEDURE — 86900 BLOOD TYPING SEROLOGIC ABO: CPT

## 2022-09-26 PROCEDURE — 86901 BLOOD TYPING SEROLOGIC RH(D): CPT

## 2022-09-26 PROCEDURE — 86850 RBC ANTIBODY SCREEN: CPT

## 2022-09-26 PROCEDURE — 86905 BLOOD TYPING RBC ANTIGENS: CPT

## 2022-09-26 NOTE — PATIENT INSTRUCTIONS
Patient Education        Weeks 18 to 22 of Your Pregnancy: Care Instructions  At this stage you may find that your nausea and fatigue are gone. You may feel better overall and have more energy. But you might now also have some new discomforts, like sleep problems or leg cramps. You may start to feel your baby move. These movements can feel like butterflies or bubbles. Babies at this stage can now suck their thumbs. Get some exercise every day. And avoid caffeine late in the day. Take a warm shower or bath before bed. Try relaxation exercises to calm your mind and body. Use extra pillows. They can help you get comfortable. Don't use sleeping pills or alcohol. They could harm your baby. For leg cramps, stretch and apply heat. A warm bath, leg warmers, a heating pad, or a hot water bottle can help with muscle aches. Stretches for leg cramps  Straighten your leg and bend your foot (flex your ankle) slowly upward, toward your knee. Bend your toes up and down. Stand on a flat surface. Stretch your toes upward. For balance, hold on to the wall or something stable. If it feels okay, take small steps walking on your heels. Follow-up care is a key part of your treatment and safety. Be sure to make and go to all appointments, and call your doctor if you are having problems. It's also a good idea to know your test results and keep a list of the medicines you take. Where can you learn more? Go to https://[employer]ipLink.2CRisk. org and sign in to your [employer] account. Enter D746 in the [facility] box to learn more about \"Weeks 18 to 22 of Your Pregnancy: Care Instructions. \"     If you do not have an account, please click on the \"Sign Up Now\" link. Current as of: February 23, 2022               Content Version: 13.4  © 6299-8927 [employer]wise, Incorporated. Care instructions adapted under license by [facility] ([facility]).  If you have questions about a medical condition or this instruction, always ask your healthcare professional. [place] [address] any warranty or liability for your use of this information.

## 2022-09-26 NOTE — PROGRESS NOTES
Patient presents today for routine prenatal care. Pt denies any vaginal leaking and contractions. + Fetal movement. Pt does c/o bleeding. Pt states soemtimes it is spotting but other it is heavy, pt states she has hematoma's and believes it is from that.

## 2022-09-26 NOTE — PROGRESS NOTES
Frandy Connell is a 36 y.o. female 18w1d who presents for routine prenatal visit. The patient was seen and evaluated. There was negative fetal movements. No contractions, bleeding or leakage of fluid. Signs and symptoms of  labor as well as labor were reviewed. The S/S of Pre-Eclampsia were reviewed with the patient in detail. She is to report any of these if they occur. She currently denies any of these. Stanley Coleman is a 36 y.o. 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 Allergies: Penicillins  History reviewed. No pertinent past medical history. Past Surgical History:   Procedure Laterality Date    APPENDECTOMY  10/14/2011    laparoscopic     Family History   Problem Relation Age of Onset    Diabetes Paternal Grandfather     Cancer Paternal Grandfather         skin cancers     Social History     Tobacco Use    Smoking status: Never    Smokeless tobacco: Never   Substance Use Topics    Alcohol use: No         Mother's Prenatal Vitals  BP: (!) 94/58  Weight: 160 lb (72.6 kg)  Heart Rate: 73  Patient Position: Sitting  Alb/Glu  Albumin: Negative  Glucose: Negative  Prenatal Fetal Information  Fetal HR: us  Movement: Present  Physical Exam  Constitutional:       General: She is not in acute distress. Appearance: Normal appearance. She is not ill-appearing, toxic-appearing or diaphoretic. HENT:      Head: Normocephalic and atraumatic. Eyes:      Extraocular Movements: Extraocular movements intact.    Pulmonary: Effort: Pulmonary effort is normal. No respiratory distress. Abdominal:      Palpations: Abdomen is soft. Tenderness: There is no abdominal tenderness. Musculoskeletal:         General: No swelling or tenderness. Normal range of motion. Right lower leg: No edema. Left lower leg: No edema. Skin:     General: Skin is warm and dry. Findings: No rash. Neurological:      Mental Status: She is alert and oriented to person, place, and time. Psychiatric:         Attention and Perception: Attention and perception normal.         Mood and Affect: Mood and affect normal.         Speech: Speech normal.         Behavior: Behavior normal. Behavior is cooperative. Thought Content: Thought content normal.         Cognition and Memory: Cognition and memory normal.         Judgment: Judgment normal.          movements. The patient reports that the targets have been made Yes. Assessment:   Diagnosis Orders   1. Supervision of normal intrauterine pregnancy in multigravida, second trimester        2. 18 weeks gestation of pregnancy        3. Second trimester bleeding        4. Subchorionic hematoma in first trimester, single or unspecified fetus                  Plan:  1. PTL precautions   2. Return in 4 weeks  3. MFM appt on 10-5-22  I Marylou Patterson, am scribing for and in the presence of Dr. William Simpson. I, Dr. William Simpson, personally performed the services described in this documentation as scribed by Marylou Patterson in my presence, and it is both accurate and complete.

## 2022-09-27 ENCOUNTER — LAB REQUISITION (OUTPATIENT)
Dept: LAB | Facility: HOSPITAL | Age: 32
End: 2022-09-27

## 2022-09-27 DIAGNOSIS — Z00.00 ENCOUNTER FOR GENERAL ADULT MEDICAL EXAMINATION WITHOUT ABNORMAL FINDINGS: ICD-10-CM

## 2022-09-28 LAB — A AB TITR SERPL: NORMAL {TITER}

## 2022-10-22 ENCOUNTER — APPOINTMENT (OUTPATIENT)
Dept: ULTRASOUND IMAGING | Age: 32
End: 2022-10-22
Payer: COMMERCIAL

## 2022-10-22 ENCOUNTER — HOSPITAL ENCOUNTER (OUTPATIENT)
Age: 32
Discharge: HOME | End: 2022-10-23
Attending: OBSTETRICS & GYNECOLOGY | Admitting: OBSTETRICS & GYNECOLOGY
Payer: COMMERCIAL

## 2022-10-22 VITALS
TEMPERATURE: 97.7 F | OXYGEN SATURATION: 99 % | RESPIRATION RATE: 18 BRPM | DIASTOLIC BLOOD PRESSURE: 59 MMHG | BODY MASS INDEX: 28.35 KG/M2 | SYSTOLIC BLOOD PRESSURE: 114 MMHG | HEIGHT: 63 IN | HEART RATE: 76 BPM | WEIGHT: 160 LBS

## 2022-10-22 PROBLEM — N93.9 VAGINAL BLEEDING: Status: ACTIVE | Noted: 2022-10-22

## 2022-10-22 PROCEDURE — 99212 OFFICE O/P EST SF 10 MIN: CPT

## 2022-10-22 PROCEDURE — 76815 OB US LIMITED FETUS(S): CPT

## 2022-10-22 RX ORDER — ONDANSETRON 4 MG/1
4 TABLET, ORALLY DISINTEGRATING ORAL EVERY 8 HOURS PRN
Status: DISCONTINUED | OUTPATIENT
Start: 2022-10-22 | End: 2022-10-23 | Stop reason: HOSPADM

## 2022-10-22 RX ORDER — ONDANSETRON 2 MG/ML
4 INJECTION INTRAMUSCULAR; INTRAVENOUS EVERY 6 HOURS PRN
Status: DISCONTINUED | OUTPATIENT
Start: 2022-10-22 | End: 2022-10-23 | Stop reason: HOSPADM

## 2022-10-22 RX ORDER — ACETAMINOPHEN 325 MG/1
650 TABLET ORAL EVERY 4 HOURS PRN
Status: DISCONTINUED | OUTPATIENT
Start: 2022-10-22 | End: 2022-10-23 | Stop reason: HOSPADM

## 2022-10-23 NOTE — DISCHARGE INSTRUCTIONS
LABOR AND DELIVERY - OBSERVATION DISCHARGE INSTRUCTIONS    TPRE-TERM LABOR  _x_Your gestational age is 22.0 weeks: term pregnancy starts at 42 weeks. __Fill your prescription and take as directed. __Bed rest (left lying position is best). __Refrain from sexual intercourse until you see your physician again. __No heavy lifting or strenuous activity. RETURN TO HOSPITAL IF:  __Contractions occur 3-4 in any hour (every 10-15 minutes), maybe with or without pain. __Rhythmic or constant menstrual-like cramps  __Rhythmic or constant lower, dull backache may radiate to the sides or front. Increase or change in vaginal discharge, may be watery, pink, red or brown-tinged. LOW LYING/MARGINAL PLACENTA PREVIA  __No sexual intercourse  __No douching or tampon use  _x_No heavy lifting or strenuous activity  __No long trips without physician's consent  __Limited activity for __________________    RETURN TO HOSPITAL IMMEDIATELY IF:  _x_Vaginal bleeding, usually bright red and painless. May be intermittent, may come in gushes or continuous.     OTHER INSTRUCTIONS  _x_Make an appointment to see your attending physician for *** keep scheduled follow up  __After today's visit, you will need to return to registration and pre register for your next visit     NOTE: If you do not begin to feel better, or you have any questions, contact your physician or call [phone], or return to the hospital.

## 2022-10-23 NOTE — FLOWSHEET NOTE
Notified RICARDO Gordillo of bleeding and preliminary ultrasound results. States patient can go home if comfortable or stay for overnight obs until official ultrasound results come back.

## 2022-10-23 NOTE — FLOWSHEET NOTE
Discharge instructions reviewed. Patient verbalizes understanding. Patient to be on pelvic rest until told otherwise. Instructed patient to come back to L&D if she has further bleeding. Patient ambulated out of unit with a strong, steady gait.

## 2022-10-23 NOTE — FLOWSHEET NOTE
Discussed options with patient. Patient unsure if she wants to stay overnight or go home. Informed patient she can think on it and to call me when she has made a decision.

## 2022-10-23 NOTE — FLOWSHEET NOTE
Pt went to bathroom, no blood noted on pad. Large amount of dark red blood in toilet and on toilet paper. No clots.

## 2022-10-24 ENCOUNTER — ROUTINE PRENATAL (OUTPATIENT)
Dept: OBGYN CLINIC | Age: 32
End: 2022-10-24

## 2022-10-24 VITALS
WEIGHT: 164 LBS | HEART RATE: 73 BPM | DIASTOLIC BLOOD PRESSURE: 66 MMHG | SYSTOLIC BLOOD PRESSURE: 107 MMHG | BODY MASS INDEX: 29.05 KG/M2

## 2022-10-24 DIAGNOSIS — R39.89 BLADDER PAIN: ICD-10-CM

## 2022-10-24 DIAGNOSIS — Z67.91 RH NEGATIVE STATE IN ANTEPARTUM PERIOD: ICD-10-CM

## 2022-10-24 DIAGNOSIS — O46.90 VAGINAL BLEEDING IN PREGNANCY: ICD-10-CM

## 2022-10-24 DIAGNOSIS — Z3A.22 22 WEEKS GESTATION OF PREGNANCY: Primary | ICD-10-CM

## 2022-10-24 DIAGNOSIS — Z34.82 ENCOUNTER FOR SUPERVISION OF OTHER NORMAL PREGNANCY IN SECOND TRIMESTER: ICD-10-CM

## 2022-10-24 DIAGNOSIS — O26.899 RH NEGATIVE STATE IN ANTEPARTUM PERIOD: ICD-10-CM

## 2022-10-24 LAB
BILIRUBIN URINE: NEGATIVE
BLOOD, URINE: NEGATIVE
CLARITY: CLEAR
COLOR: YELLOW
GLUCOSE URINE: NEGATIVE MG/DL
HCT VFR BLD CALC: 36.5 % (ref 37–47)
HEMOGLOBIN: 11.9 G/DL (ref 12–16)
KETONES, URINE: NEGATIVE MG/DL
LEUKOCYTE ESTERASE, URINE: NEGATIVE
MCH RBC QN AUTO: 32.4 PG (ref 27–31)
MCHC RBC AUTO-ENTMCNC: 32.6 G/DL (ref 33–37)
MCV RBC AUTO: 99.5 FL (ref 81–99)
NITRITE, URINE: NEGATIVE
PDW BLD-RTO: 13.1 % (ref 11.5–14.5)
PH UA: 8 (ref 5–8)
PLATELET # BLD: 245 K/UL (ref 130–400)
PMV BLD AUTO: 10.4 FL (ref 9.4–12.3)
PROTEIN UA: NEGATIVE MG/DL
RBC # BLD: 3.67 M/UL (ref 4.2–5.4)
SPECIFIC GRAVITY UA: 1.01 (ref 1–1.03)
UROBILINOGEN, URINE: 0.2 E.U./DL
WBC # BLD: 9.2 K/UL (ref 4.8–10.8)

## 2022-10-24 PROCEDURE — 0502F SUBSEQUENT PRENATAL CARE: CPT | Performed by: OBSTETRICS & GYNECOLOGY

## 2022-10-24 NOTE — PROGRESS NOTES
Prudence Krabbe is a 36 y.o. female 22w2d who presents for routine prenatal visit. The patient was seen and evaluated. There was positive fetal movements. No contractions, bleeding or leakage of fluid. Signs and symptoms of  labor as well as labor were reviewed. The S/S of Pre-Eclampsia were reviewed with the patient in detail. She is to report any of these if they occur. She currently denies any of these. Pt had an episode of heavy bleeding on Saturday that was heavy after urination. Some dark spotting following. Nothing today. Zena Cortes is a 36 y.o. female with the following history as recorded in 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 Allergies: Penicillins  History reviewed. No pertinent past medical history. Past Surgical History:   Procedure Laterality Date    APPENDECTOMY  10/14/2011    laparoscopic     Family History   Problem Relation Age of Onset    Diabetes Paternal Grandfather     Cancer Paternal Grandfather         skin cancers     Social History     Tobacco Use    Smoking status: Never    Smokeless tobacco: Never   Substance Use Topics    Alcohol use: No         Mother's Prenatal Vitals  BP: 107/66  Weight: 164 lb (74.4 kg)  Heart Rate: 73  Patient Position: Sitting  Alb/Glu  Albumin: Trace  Glucose: Negative  Prenatal Fetal Information  Fetal HR: 161  Movement: Present  Physical Exam  Constitutional:       General: She is not in acute distress. Appearance: Normal appearance.  She is not ill-appearing, toxic-appearing or diaphoretic. HENT:      Head: Normocephalic and atraumatic. Eyes:      Extraocular Movements: Extraocular movements intact. Pulmonary:      Effort: Pulmonary effort is normal. No respiratory distress. Abdominal:      Palpations: Abdomen is soft. Tenderness: There is no abdominal tenderness. Genitourinary:     General: Normal vulva. Labia:         Right: No rash, tenderness, lesion or injury. Left: No rash, tenderness, lesion or injury. Comments: Scant old blood. Cervix closed  Musculoskeletal:         General: No swelling or tenderness. Normal range of motion. Right lower leg: No edema. Left lower leg: No edema. Skin:     General: Skin is warm and dry. Findings: No rash. Neurological:      Mental Status: She is alert and oriented to person, place, and time. Psychiatric:         Attention and Perception: Attention and perception normal.         Mood and Affect: Mood and affect normal.         Speech: Speech normal.         Behavior: Behavior normal. Behavior is cooperative. Thought Content: Thought content normal.         Cognition and Memory: Cognition and memory normal.         Judgment: Judgment normal.         The patient had her 28 week labs ordered. T-Dap Vaccine (27-36 weeks): awaiting    The patient was instructed on fetal kick counts and was given a kick sheet to complete every 8 hours. She was instructed that the baby should move at a minimum of ten times within one hour after a meal. The patient was instructed to lay down on her left side twenty minutes after eating and count movements for up to one hour with a target value of ten movements. She was instructed to notify the office if she did not make that target after two attempts or if after any attempt there was less than four movements. The patient reports that the targets have been made Yes.     Assessment:   Diagnosis Orders   1. 22 weeks gestation of pregnancy 2. Rh negative state in antepartum period  Rhogam Immune Globulin, Antepartum      3. Encounter for supervision of other normal pregnancy in second trimester  CBC    Glucose 1 Hour Postprandial      4. Vaginal bleeding in pregnancy  CBC    Urinalysis    Culture, Urine      5. Bladder pain  Urinalysis    Culture, Urine                Plan:  1. SAB precautions   2. Return in 4 weeks  3. Glucose 1 hr, cbc, rhogam next visit  4. CBC today, urinalysis  I Lou Solis, am scribing for and in the presence of Dr. Lul Pablo. I, Dr. Lul Pablo, personally performed the services described in this documentation as scribed by Lou Solis in my presence, and it is both accurate and complete.

## 2022-10-24 NOTE — PATIENT INSTRUCTIONS
Patient Education        Weeks 22 to 26 of Your Pregnancy: Care Instructions  Your baby's lungs are getting ready for breathing. Your baby may respond to your voice. Your baby likely turns less, and kicks or jerks more. Jerking may mean that your baby has hiccups. Think about taking childbirth classes. And start to think about whether you want to have pain medicine during labor. At your next doctor visit, you may be tested for high blood sugar that first occurs during pregnancy (gestational diabetes). This condition can cause problems for you and your baby. To ease discomfort from your baby's kicking   Change your position. Take a deep breath as you raise your arm over your head, and breathe out as you drop your arm. To ease or reduce swelling in your feet, ankles, hands, and fingers   Take off your rings. Avoid high-salt foods, such as potato chips. Prop up your feet, and sleep with pillows under your feet. Do not stand for long periods of time. Do not wear tight shoes. Wear support stockings. Kegel exercises to prevent urine from leaking  Squeeze your muscles as if you were trying not to pass gas. Your belly, legs, and buttocks shouldn't move. Hold the squeeze for 3 seconds, then relax for 5 to 10 seconds. Add 1 second each week until you can squeeze for 10 seconds. Repeat the exercise 10 times a session. Do 3 to 8 sessions a day. If these exercises cause you pain, stop doing them and talk with your doctor. Follow-up care is a key part of your treatment and safety. Be sure to make and go to all appointments, and call your doctor if you are having problems. It's also a good idea to know your test results and keep a list of the medicines you take. Where can you learn more? Go to https://chshannoneweb.coin4ce. org and sign in to your PowerPlan account. Enter G264 in the Pinevio box to learn more about \"Weeks 22 to 26 of Your Pregnancy: Care Instructions. \"     If you do not have an account, please click on the \"Sign Up Now\" link. Current as of: February 23, 2022               Content Version: 13.4  © 2006-2022 Healthwise, Incorporated. Care instructions adapted under license by [facility] ([facility]). If you have questions about a medical condition or this instruction, always ask your healthcare professional. [address] any warranty or liability for your use of this information.

## 2022-10-24 NOTE — PROGRESS NOTES
Patient presents today for routine prenatal care. Pt denies any vaginal leaking bleeding or contractions. + Fetal movement. Pt states she is still spotting, lightened up.

## 2022-10-26 LAB — URINE CULTURE, ROUTINE: NORMAL

## 2022-11-07 ENCOUNTER — ROUTINE PRENATAL (OUTPATIENT)
Dept: OBGYN CLINIC | Age: 32
End: 2022-11-07

## 2022-11-07 VITALS
DIASTOLIC BLOOD PRESSURE: 66 MMHG | BODY MASS INDEX: 29.58 KG/M2 | HEART RATE: 82 BPM | WEIGHT: 167 LBS | SYSTOLIC BLOOD PRESSURE: 106 MMHG

## 2022-11-07 DIAGNOSIS — N76.0 BV (BACTERIAL VAGINOSIS): ICD-10-CM

## 2022-11-07 DIAGNOSIS — B96.89 BV (BACTERIAL VAGINOSIS): ICD-10-CM

## 2022-11-07 DIAGNOSIS — Z22.39 CARRIER OF UREAPLASMA UREALYTICUM: ICD-10-CM

## 2022-11-07 DIAGNOSIS — Z3A.24 24 WEEKS GESTATION OF PREGNANCY: Primary | ICD-10-CM

## 2022-11-07 PROCEDURE — 0502F SUBSEQUENT PRENATAL CARE: CPT | Performed by: OBSTETRICS & GYNECOLOGY

## 2022-11-07 RX ORDER — AZITHROMYCIN 500 MG/1
1000 TABLET, FILM COATED ORAL ONCE
Qty: 2 TABLET | Refills: 0 | Status: SHIPPED | OUTPATIENT
Start: 2022-11-07 | End: 2022-11-07

## 2022-11-07 NOTE — PATIENT INSTRUCTIONS
Patient Education        Weeks 26 to 30 of Your Pregnancy: Care Instructions  You're starting your last trimester. Raleigh Coronado probably feel your baby moving around more. Your back may ache as your body gets used to your baby's size and length. Take care of yourself, and pay attention to what your body needs. Talk to your doctor about getting the Tdap shot. It will help protect your  against whooping cough (pertussis). You may have Crowheart-Raman contractions. They are single or several strong contractions without a pattern. These are practice contractions but not the start of labor. Be kind to yourself. Take breaks when you're tired. Change positions often. Don't sit for too long or stand for too long. At work, rest during breaks if you can. If you don't get breaks, talk to your doctor about writing a letter to your employer to request them. Avoid fumes, chemicals, and tobacco smoke. Be sexual if you want to. You may be interested in sex, or you may not. Everyone is different. Sex is okay unless your doctor tells you not to. Your belly can make it hard to find good positions for sex. [place] and explore. Watch for signs of  labor. These signs include:  Menstrual-like cramps. Or you may have pain or pressure in your pelvis that happens in a pattern. About 6 or more contractions in an hour (even after rest and a glass of water). A low, dull backache that doesn't go away when you change positions. An increase or change in vaginal discharge. Your water breaking. Know what to do if you think you are having contractions. Drink 1 or 2 glasses of water. Lie down on your left side for at least an hour. While on your side, feel the top of your belly to see if it's tight. Write down your contractions for an hour. Time how long it is from the start of one contraction to the start of the next. Call your doctor if you have regular contractions.   Follow-up care is a key part of your treatment and safety. Be sure to make and go to all appointments, and call your doctor if you are having problems. It's also a good idea to know your test results and keep a list of the medicines you take. Where can you learn more? Go to https://hubert.healthE-Semble. org and sign in to your Artoo account. Enter S147 in the [place] rouge.com box to learn more about \"Weeks 26 to 30 of Your Pregnancy: Care Instructions. \"     If you do not have an account, please click on the \"Sign Up Now\" link. Current as of: February 23, 2022               Content Version: 13.4  © 7693-2017 Healthwise, Sp[employer]. Care instructions adapted under license by [facility] ([facility]). If you have questions about a medical condition or this instruction, always ask your healthcare professional. [address] any warranty or liability for your use of this information. [facility]   One Hour Glucose Test Instructions    The 1 Hour Glucose test is designed to screen for gestational diabetes. This screening test is usually performed between 26-29 weeks of gestation. Gestational diabetes results in higher than normal blood sugar levels and can lead to pregnancy complications if not diagnosed and treated. For this reason, we recommend that all women undergo screening.  - You may eat or drink a normal breakfast or lunch prior to the test, but please avoid anything that contains excessive sugar. For example, do not eat sugary cereals, candy or drink soda or fruit juice.  - You will be given this drink at the Outpatient Lab (#[id number]) located on the 1st floor of [address] [place] [address] the entire bottle of the glucose drink, within 10 minutes and note the time that you finish the drink. Also, inform the  of the time that you finish.  After drinking the glucose, you may only have water until your blood is drawn.    - Your blood needs to be drawn precisely 1 hour after you finished the glucose drink. If you have a prenatal appointment as well, when you arrive at the office please let the  know that you are doing the glucose test. Let her know the time you need to return to the lab area to have your blood drawn for the testing.  - You will also have a CBC drawn at this time to check your blood count and check your iron.      Please do not hesitate to call the office at [phone], option 2, if you have any additional questions

## 2022-11-07 NOTE — PROGRESS NOTES
Jessica Tong is a 36 y.o. female 24w2d who presents for routine prenatal visit. The patient was seen and evaluated. There was positive fetal movements. No contractions, bleeding or leakage of fluid. Signs and symptoms of  labor as well as labor were reviewed. The S/S of Pre-Eclampsia were reviewed with the patient in detail. She is to report any of these if they occur. She currently denies any of these. Pt having small amount of spotting.     Ana Shultz is a 36 y.o. 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 Allergies: Penicillins  History reviewed. No pertinent past medical history. Past Surgical History:   Procedure Laterality Date    APPENDECTOMY  10/14/2011    laparoscopic     Family History   Problem Relation Age of Onset    Diabetes Paternal Grandfather     Cancer Paternal Grandfather         skin cancers     Social History     Tobacco Use    Smoking status: Never    Smokeless tobacco: Never   Substance Use Topics    Alcohol use: No         Mother's Prenatal Vitals  BP: 106/66  Weight: 167 lb (75.8 kg)  Heart Rate: 82  Patient Position: Sitting  Alb/Glu  Albumin: Negative  Glucose: Negative  Prenatal Fetal Information  Fundal Height (cm): 23 cm  Fetal HR: 160  Movement: Present  Physical Exam  Constitutional:       General: She is not in acute distress. Appearance: Normal appearance.  She is not ill-appearing, toxic-appearing or diaphoretic. HENT:      Head: Normocephalic and atraumatic. Eyes:      Extraocular Movements: Extraocular movements intact. Pulmonary:      Effort: Pulmonary effort is normal. No respiratory distress. Abdominal:      Palpations: Abdomen is soft. Tenderness: There is no abdominal tenderness. Musculoskeletal:         General: No swelling or tenderness. Normal range of motion. Right lower leg: No edema. Left lower leg: No edema. Skin:     General: Skin is warm and dry. Findings: No rash. Neurological:      Mental Status: She is alert and oriented to person, place, and time. Psychiatric:         Attention and Perception: Attention and perception normal.         Mood and Affect: Mood and affect normal.         Speech: Speech normal.         Behavior: Behavior normal. Behavior is cooperative. Thought Content: Thought content normal.         Cognition and Memory: Cognition and memory normal.         Judgment: Judgment normal.         The patient had her 28 week labs ordered. T-Dap Vaccine (27-36 weeks): awaiting    The patient was instructed on fetal kick counts and was given a kick sheet to complete every 8 hours. She was instructed that the baby should move at a minimum of ten times within one hour after a meal. The patient was instructed to lay down on her left side twenty minutes after eating and count movements for up to one hour with a target value of ten movements. She was instructed to notify the office if she did not make that target after two attempts or if after any attempt there was less than four movements. The patient reports that the targets have been made Yes. Assessment:   Diagnosis Orders   1. 24 weeks gestation of pregnancy        2. BV (bacterial vaginosis)  azithromycin (ZITHROMAX) 500 MG tablet      3. Carrier of ureaplasma urealyticum  azithromycin (ZITHROMAX) 500 MG tablet                Plan:  1.  SAB precautions   2. Return in 4 weeks  3. Glucose 1 hr, cbc, rhogam next visit  4. Discussed Diatherix, told she has BV and ureaplasma, Zithromax 1 g sent  5. Will schedule appt with MFM  I Mary Dotson, am scribing for and in the presence of Dr. Kasia Moctezuma. I, Dr. Kasia Moctezuma, personally performed the services described in this documentation as scribed by Mary Dotson in my presence, and it is both accurate and complete.

## 2022-11-07 NOTE — PROGRESS NOTES
Pt is here for prenatal appointment. She denies cramping, contractions. Pt states she had some bleeding yesterday and today it is spotting. Pt states the bleeding wasn't a lot, just enough to catch her attention and today it is more of a brown discharge.

## 2022-11-08 ENCOUNTER — LAB (OUTPATIENT)
Dept: LAB | Facility: HOSPITAL | Age: 32
End: 2022-11-08

## 2022-11-08 ENCOUNTER — TRANSCRIBE ORDERS (OUTPATIENT)
Dept: ADMINISTRATIVE | Facility: HOSPITAL | Age: 32
End: 2022-11-08

## 2022-11-08 DIAGNOSIS — O36.1920 MATERNAL RED CELL ALLOIMMUNIZATION, ANTEPARTUM, SECOND TRIMESTER, NOT APPLICABLE OR UNSPECIFIED FETUS: ICD-10-CM

## 2022-11-08 DIAGNOSIS — O36.1920 MATERNAL RED CELL ALLOIMMUNIZATION, ANTEPARTUM, SECOND TRIMESTER, NOT APPLICABLE OR UNSPECIFIED FETUS: Primary | ICD-10-CM

## 2022-11-08 PROCEDURE — 36415 COLL VENOUS BLD VENIPUNCTURE: CPT

## 2022-11-08 PROCEDURE — 86850 RBC ANTIBODY SCREEN: CPT

## 2022-11-08 PROCEDURE — 86886 COOMBS TEST INDIRECT TITER: CPT

## 2022-11-08 PROCEDURE — 86870 RBC ANTIBODY IDENTIFICATION: CPT

## 2022-11-09 LAB
A AB TITR SERPL: NORMAL {TITER}
ANTI-K: NORMAL
BLD GP AB SCN SERPL QL: POSITIVE
BLD GP AB SCN SERPL QL: POSITIVE

## 2022-11-28 ENCOUNTER — ROUTINE PRENATAL (OUTPATIENT)
Dept: OBGYN CLINIC | Age: 32
End: 2022-11-28
Payer: COMMERCIAL

## 2022-11-28 VITALS
BODY MASS INDEX: 30.65 KG/M2 | SYSTOLIC BLOOD PRESSURE: 117 MMHG | HEART RATE: 87 BPM | WEIGHT: 173 LBS | DIASTOLIC BLOOD PRESSURE: 75 MMHG

## 2022-11-28 DIAGNOSIS — O26.899 RH NEGATIVE STATE IN ANTEPARTUM PERIOD: ICD-10-CM

## 2022-11-28 DIAGNOSIS — Z34.82 ENCOUNTER FOR SUPERVISION OF OTHER NORMAL PREGNANCY IN SECOND TRIMESTER: ICD-10-CM

## 2022-11-28 DIAGNOSIS — Z67.91 RH NEGATIVE STATE IN ANTEPARTUM PERIOD: ICD-10-CM

## 2022-11-28 DIAGNOSIS — Z3A.27 27 WEEKS GESTATION OF PREGNANCY: Primary | ICD-10-CM

## 2022-11-28 LAB
ABO/RH: NORMAL
ANTIBODY SCREEN: NORMAL
GLUCOSE, 1HR PP: 118 MG/DL (ref 75–140)
HCT VFR BLD CALC: 36.2 % (ref 37–47)
HEMOGLOBIN: 12.1 G/DL (ref 12–16)
MCH RBC QN AUTO: 33.2 PG (ref 27–31)
MCHC RBC AUTO-ENTMCNC: 33.4 G/DL (ref 33–37)
MCV RBC AUTO: 99.5 FL (ref 81–99)
PDW BLD-RTO: 13.5 % (ref 11.5–14.5)
PLATELET # BLD: 231 K/UL (ref 130–400)
PMV BLD AUTO: 10 FL (ref 9.4–12.3)
RBC # BLD: 3.64 M/UL (ref 4.2–5.4)
RHIG LOT NUMBER: NORMAL
WBC # BLD: 9.5 K/UL (ref 4.8–10.8)

## 2022-11-28 PROCEDURE — 0502F SUBSEQUENT PRENATAL CARE: CPT | Performed by: NURSE PRACTITIONER

## 2022-11-28 PROCEDURE — 96372 THER/PROPH/DIAG INJ SC/IM: CPT | Performed by: NURSE PRACTITIONER

## 2022-11-28 NOTE — PROGRESS NOTES
Patient presents today for routine prenatal visit. Pt denies any vaginal leaking bleeding or contractions. + Fetal movement. Darshana Schilling is here for a return obstetrical visit. Today she is 27w2d weeks EGA. She is doing well and has no complaints. Doing 3d today and GCT as well as Rhogam.  She  does not have vaginal bleeding, leaking of fluid, contractions. She does not have blurred vision, SOB, or increased swelling in legs or face. Pt does feel fetal movement regularly. O:   Vitals:    22 0952   BP: 117/75   Pulse: 87   Weight: 173 lb (78.5 kg)     Pt is A&Ox3, in no acute distress. Normocephalic, atraumatic. PERRL. Resp even and non-labored. Skin pink, warm & dry. Gravid abdomen. GUILLAUME's well. Gait steady. See OB flowsheet. A: Normal IUP at 27w2d wks      Diagnosis Orders   1. 27 weeks gestation of pregnancy        2. Encounter for supervision of other normal pregnancy in second trimester        3. Rh negative state in antepartum period            P:   Pt counseled on PIH precautions, Kick count and  labor  Continue with routine prenatal care. RTC in 2 wk for prenatal visit    MEDICATIONS:  No orders of the defined types were placed in this encounter. ORDERS:  No orders of the defined types were placed in this encounter.

## 2022-11-28 NOTE — PROGRESS NOTES
After obtaining consent, and per orders of Mark VELEZ, injection of rhogam given in Left upper quad. gluteus by Yudy Moeller MA. Patient instructed to remain in clinic for 20 minutes afterwards, and to report any adverse reaction to me immediately.

## 2022-12-07 ENCOUNTER — HOSPITAL ENCOUNTER (INPATIENT)
Facility: HOSPITAL | Age: 32
LOS: 15 days | Discharge: HOME | End: 2022-12-22
Attending: OBSTETRICS & GYNECOLOGY | Admitting: OBSTETRICS & GYNECOLOGY
Payer: COMMERCIAL

## 2022-12-07 ENCOUNTER — APPOINTMENT (OUTPATIENT)
Dept: ULTRASOUND IMAGING | Facility: HOSPITAL | Age: 32
End: 2022-12-07
Payer: COMMERCIAL

## 2022-12-07 ENCOUNTER — HOSPITAL ENCOUNTER (OUTPATIENT)
Age: 32
Discharge: ANOTHER HOSPITAL | End: 2022-12-07
Attending: ADVANCED PRACTICE MIDWIFE | Admitting: ADVANCED PRACTICE MIDWIFE
Payer: COMMERCIAL

## 2022-12-07 VITALS
RESPIRATION RATE: 16 BRPM | OXYGEN SATURATION: 100 % | SYSTOLIC BLOOD PRESSURE: 125 MMHG | WEIGHT: 173 LBS | BODY MASS INDEX: 30.65 KG/M2 | HEIGHT: 63 IN | HEART RATE: 89 BPM | DIASTOLIC BLOOD PRESSURE: 72 MMHG | TEMPERATURE: 96.5 F

## 2022-12-07 DIAGNOSIS — Z98.891 STATUS POST PRIMARY LOW TRANSVERSE CESAREAN SECTION: ICD-10-CM

## 2022-12-07 PROBLEM — O47.00 THREATENED PREMATURE LABOR: Status: ACTIVE | Noted: 2022-12-07

## 2022-12-07 PROBLEM — Z3A.29 29 WEEKS GESTATION OF PREGNANCY: Status: ACTIVE | Noted: 2022-12-07

## 2022-12-07 LAB
A1 MICROGLOB PLACENTAL VAG QL: POSITIVE
ABO GROUP BLD: NORMAL
AMNISURE, POC: POSITIVE
AMNISURE, POC: POSITIVE
BLD GP AB SCN SERPL QL: POSITIVE
DEPRECATED RDW RBC AUTO: 47 FL (ref 37–54)
ERYTHROCYTE [DISTWIDTH] IN BLOOD BY AUTOMATED COUNT: 13.1 % (ref 12.3–15.4)
HCT VFR BLD AUTO: 34.9 % (ref 34–46.6)
HGB BLD-MCNC: 11.3 G/DL (ref 12–15.9)
Lab: NORMAL
Lab: NORMAL
MCH RBC QN AUTO: 31.7 PG (ref 26.6–33)
MCHC RBC AUTO-ENTMCNC: 32.4 G/DL (ref 31.5–35.7)
MCV RBC AUTO: 98 FL (ref 79–97)
NEGATIVE QC PASS/FAIL: NORMAL
NEGATIVE QC PASS/FAIL: NORMAL
PLATELET # BLD AUTO: 217 10*3/MM3 (ref 140–450)
PMV BLD AUTO: 9.9 FL (ref 6–12)
POSITIVE QC PASS/FAIL: NORMAL
POSITIVE QC PASS/FAIL: NORMAL
RBC # BLD AUTO: 3.56 10*6/MM3 (ref 3.77–5.28)
RESIDUAL RHIG DETECTED: NORMAL
RH BLD: NEGATIVE
T&S EXPIRATION DATE: NORMAL
WBC NRBC COR # BLD: 9.63 10*3/MM3 (ref 3.4–10.8)

## 2022-12-07 PROCEDURE — 84112 EVAL AMNIOTIC FLUID PROTEIN: CPT

## 2022-12-07 PROCEDURE — 84112 EVAL AMNIOTIC FLUID PROTEIN: CPT | Performed by: OBSTETRICS & GYNECOLOGY

## 2022-12-07 PROCEDURE — 86902 BLOOD TYPE ANTIGEN DONOR EA: CPT

## 2022-12-07 PROCEDURE — 85027 COMPLETE CBC AUTOMATED: CPT | Performed by: OBSTETRICS & GYNECOLOGY

## 2022-12-07 PROCEDURE — 76815 OB US LIMITED FETUS(S): CPT

## 2022-12-07 PROCEDURE — 86850 RBC ANTIBODY SCREEN: CPT | Performed by: OBSTETRICS & GYNECOLOGY

## 2022-12-07 PROCEDURE — 25010000002 BETAMETHASONE ACET & SOD PHOS PER 4 MG: Performed by: OBSTETRICS & GYNECOLOGY

## 2022-12-07 PROCEDURE — 99222 1ST HOSP IP/OBS MODERATE 55: CPT | Performed by: OBSTETRICS & GYNECOLOGY

## 2022-12-07 PROCEDURE — 99214 OFFICE O/P EST MOD 30 MIN: CPT

## 2022-12-07 PROCEDURE — 86901 BLOOD TYPING SEROLOGIC RH(D): CPT | Performed by: OBSTETRICS & GYNECOLOGY

## 2022-12-07 PROCEDURE — 86920 COMPATIBILITY TEST SPIN: CPT

## 2022-12-07 PROCEDURE — 86922 COMPATIBILITY TEST ANTIGLOB: CPT

## 2022-12-07 PROCEDURE — 86900 BLOOD TYPING SEROLOGIC ABO: CPT | Performed by: OBSTETRICS & GYNECOLOGY

## 2022-12-07 PROCEDURE — 25010000002 AMPICILLIN PER 500 MG: Performed by: OBSTETRICS & GYNECOLOGY

## 2022-12-07 PROCEDURE — 86870 RBC ANTIBODY IDENTIFICATION: CPT | Performed by: OBSTETRICS & GYNECOLOGY

## 2022-12-07 RX ORDER — AZITHROMYCIN 250 MG/1
1000 TABLET, FILM COATED ORAL ONCE
Status: COMPLETED | OUTPATIENT
Start: 2022-12-07 | End: 2022-12-07

## 2022-12-07 RX ORDER — ONDANSETRON 2 MG/ML
4 INJECTION INTRAMUSCULAR; INTRAVENOUS EVERY 6 HOURS PRN
Status: DISCONTINUED | OUTPATIENT
Start: 2022-12-07 | End: 2022-12-07 | Stop reason: HOSPADM

## 2022-12-07 RX ORDER — ACETAMINOPHEN 325 MG/1
650 TABLET ORAL EVERY 4 HOURS PRN
Status: DISCONTINUED | OUTPATIENT
Start: 2022-12-07 | End: 2022-12-07 | Stop reason: HOSPADM

## 2022-12-07 RX ORDER — ONDANSETRON 4 MG/1
4 TABLET, FILM COATED ORAL EVERY 8 HOURS PRN
Status: DISCONTINUED | OUTPATIENT
Start: 2022-12-07 | End: 2022-12-20

## 2022-12-07 RX ORDER — SODIUM CHLORIDE 0.9 % (FLUSH) 0.9 %
10 SYRINGE (ML) INJECTION AS NEEDED
Status: DISCONTINUED | OUTPATIENT
Start: 2022-12-07 | End: 2022-12-20 | Stop reason: HOSPADM

## 2022-12-07 RX ORDER — SODIUM CHLORIDE 0.9 % (FLUSH) 0.9 %
10 SYRINGE (ML) INJECTION EVERY 12 HOURS SCHEDULED
Status: DISCONTINUED | OUTPATIENT
Start: 2022-12-07 | End: 2022-12-20 | Stop reason: HOSPADM

## 2022-12-07 RX ORDER — SODIUM CHLORIDE, SODIUM LACTATE, POTASSIUM CHLORIDE, CALCIUM CHLORIDE 600; 310; 30; 20 MG/100ML; MG/100ML; MG/100ML; MG/100ML
125 INJECTION, SOLUTION INTRAVENOUS CONTINUOUS
Status: DISCONTINUED | OUTPATIENT
Start: 2022-12-07 | End: 2022-12-08

## 2022-12-07 RX ORDER — LIDOCAINE HYDROCHLORIDE 10 MG/ML
5 INJECTION, SOLUTION EPIDURAL; INFILTRATION; INTRACAUDAL; PERINEURAL AS NEEDED
Status: DISCONTINUED | OUTPATIENT
Start: 2022-12-07 | End: 2022-12-20 | Stop reason: HOSPADM

## 2022-12-07 RX ORDER — AMOXICILLIN 500 MG/1
500 CAPSULE ORAL EVERY 8 HOURS
Status: COMPLETED | OUTPATIENT
Start: 2022-12-09 | End: 2022-12-14

## 2022-12-07 RX ORDER — ONDANSETRON 4 MG/1
4 TABLET, ORALLY DISINTEGRATING ORAL EVERY 8 HOURS PRN
Status: DISCONTINUED | OUTPATIENT
Start: 2022-12-07 | End: 2022-12-07 | Stop reason: HOSPADM

## 2022-12-07 RX ORDER — PRENATAL VIT NO.126/IRON/FOLIC 28MG-0.8MG
TABLET ORAL DAILY
COMMUNITY

## 2022-12-07 RX ORDER — ONDANSETRON 2 MG/ML
4 INJECTION INTRAMUSCULAR; INTRAVENOUS EVERY 8 HOURS PRN
Status: DISCONTINUED | OUTPATIENT
Start: 2022-12-07 | End: 2022-12-20

## 2022-12-07 RX ORDER — BETAMETHASONE SODIUM PHOSPHATE AND BETAMETHASONE ACETATE 3; 3 MG/ML; MG/ML
12 INJECTION, SUSPENSION INTRA-ARTICULAR; INTRALESIONAL; INTRAMUSCULAR; SOFT TISSUE EVERY 24 HOURS
Status: COMPLETED | OUTPATIENT
Start: 2022-12-07 | End: 2022-12-08

## 2022-12-07 RX ADMIN — AMPICILLIN 2 G: 2 INJECTION, POWDER, FOR SOLUTION INTRAVENOUS at 20:55

## 2022-12-07 RX ADMIN — AZITHROMYCIN 1000 MG: 250 TABLET, FILM COATED ORAL at 09:22

## 2022-12-07 RX ADMIN — SODIUM CHLORIDE, POTASSIUM CHLORIDE, SODIUM LACTATE AND CALCIUM CHLORIDE 125 ML/HR: 600; 310; 30; 20 INJECTION, SOLUTION INTRAVENOUS at 17:25

## 2022-12-07 RX ADMIN — AMPICILLIN 2 G: 2 INJECTION, POWDER, FOR SOLUTION INTRAVENOUS at 14:03

## 2022-12-07 RX ADMIN — BETAMETHASONE SODIUM PHOSPHATE AND BETAMETHASONE ACETATE 12 MG: 3; 3 INJECTION, SUSPENSION INTRA-ARTICULAR; INTRALESIONAL; INTRAMUSCULAR at 06:13

## 2022-12-07 RX ADMIN — AMPICILLIN 2 G: 2 INJECTION, POWDER, FOR SOLUTION INTRAVENOUS at 06:25

## 2022-12-07 RX ADMIN — SODIUM CHLORIDE, POTASSIUM CHLORIDE, SODIUM LACTATE AND CALCIUM CHLORIDE 125 ML/HR: 600; 310; 30; 20 INJECTION, SOLUTION INTRAVENOUS at 06:18

## 2022-12-07 NOTE — CONSULTS
MFM consult    Patient Active Problem List    Diagnosis Date Noted   •  premature rupture of membranes (PPROM) with unknown onset of labor 2022   • Vaginal bleeding during pregnancy, antepartum 2022   • Tere isoimmunization during pregnancy 08/10/2022      premature rupture of membranes (PPROM) with unknown onset of labor  31yo   Currently 27 2/7 weeks    Multiple bleeding episodes this pregnancy.   Two in the first trimester followed by some bleeding ~14 weeks.   Was stable until a fairly significant episode ~22 weeks. Evaluated at [facility] at the time.     No bleeding for ~4 weeks but had PROM earlier today  Currently without signs of labor or infection  VSS  On latency antibiotics and receiving a BMTZ course  Has been seen by Dr Parsons from neonatology as well.     An US was performed today.   Please see AS for the full report.   Breech presentation.   Overall size is consistent with dates. Interval growth   appropriate. EFW 1125g   Anatomy reviewed; no structural anomalies visualized.   Normal fluid pocket at this time.   BPP         Findings discussed with the couple.     We discussed the issues created with PROM at this gestational age.   Primary concern is for infection  If there is clinical concern for infection, delivery would be required regardless of gestational age.   We discussed the reasons behind the use of latency antibiotics and the betamethasone.     Discussed the case with Dr Parsons as well. He, based upon the current clinical picture, mom's gestational and the current EFW that keeping Cassanrda here at this facility is reasonable with the capabilities he has in this nursery.     Recommend:  Continuing the latency antibiotics. 48hrs IV, 5 days of oral then d/c  Completing the BMTZ course, second dose at 48 hours.   Inpatient management until delivery secondary to the potential for maternal and/or fetal status to change rapidly  Daily assessment of maternal and fetal  status.   If clinical concern for infection or labor-->deliver  If happens to reach 34.0 weeks, recommend delivery at that time for risk of continued in utero management then outweighs potential benefits to the baby.   Will do weekly BPPs  Will check-in with the couple when I am on campus.     All questions answered. Time on floor/unit for chart review, patient evaluation, discussion, charting and coordination of care was >40minutes with >50% in face to face counseling and coordination of care.       Longs isoimmunization during pregnancy  Last check of her titer 4 weeks ago. Remains <2      As always, if there are any questions/concerns, please do not hesitate to contact me.   Thanks  Ana  [phone]        Use of magnesium for \"neuroprotection\" is indicated if delivery is 32.0 weeks or less. The benefit from this therapy is to have an elevated magnesium level at the time of birth. With her not being in labor at this time, magnesium infusion not needed. Would restart if delivery is indicated prior to 32.0 weeks.

## 2022-12-07 NOTE — H&P
OBSTETRICAL HISTORY AND PHYSICAL    Provider:  Frida Snell MD   Date: 2022  Time: 4:54 AM    Patient Name: Ashok Atkinson  Patient : 1990  Room/Bed: 9768/4010-95    Primary Care Physician: Myra Lowe    Chief Complaint:  leakage of amniotic fluid    HPI: Ashok Atkinson is a 29 y.o.  at 28w4d weeks who presents with big gush of fluid at home. Not hurting. Nancy Whitehead yesterday afternoon at home around 3-4pm, slipped on a toy and hit her hip. 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 Current Outpatient Medications on File Prior to Encounter   Medication Sig Dispense Refill    Prenatal Vit-Fe Fumarate-FA (PRENATAL 1+1 PO) Take by mouth      ondansetron (ZOFRAN ODT) 4 MG disintegrating tablet Take 1 tablet by mouth every 8 hours as needed for Nausea or Vomiting 45 tablet 3           No past medical history on file.     Past Surgical History:   Procedure Laterality Date    APPENDECTOMY  10/14/2011    laparoscopic       OB History    Para Term  AB Living   3 2 2 0 0 2   SAB IAB Ectopic Molar Multiple Live Births   0 0 0 0 0 2      # Outcome Date GA Lbr Balta/2nd Weight Sex Delivery Anes PTL Lv   3 Current            2 Term 16    F Vag-Spont   MARITZA      Complications: Hemorrhage   1 Term 14    M Vag-Spont   MARITZA       Family History   Problem Relation Age of Onset    Diabetes Paternal Grandfather     Cancer Paternal Grandfather         skin cancers       Social History     Socioeconomic History    Marital status:      Spouse name: Not on file    Number of children: Not on file    Years of education: Not on file    Highest education level: Not on file   Occupational History    Not on file   Tobacco Use    Smoking status: Never    Smokeless tobacco: Never   Vaping Use    Vaping Use: Never used   Substance and Sexual Activity    Alcohol use: No    Drug use: Never    Sexual activity: Yes   Other Topics Concern    Not on file   Social History Narrative    Not on file     Social Determinants of Health     Financial Resource Strain: Not on file   Food Insecurity: Not on file   Transportation Needs: Not on file   Physical Activity: Not on file   Stress: Not on file   Social Connections: Not on file   Intimate Partner Violence: Not on file   Housing Stability: Not on file       Review Of Systems:  A comprehensive review of systems was negative except for what was noted in the HPI. Physical Exam:  Vitals:    12/07/22 0406 12/07/22 0411 12/07/22 0421 12/07/22 0426   BP:       Pulse:       Resp:       Temp:       TempSrc:       SpO2: 100% 100% 100% 100%   Weight:       Height:             General appearance:  awake, alert, cooperative, no apparent distress, and appears stated age  Neurologic:  Awake, alert, oriented to name, place and time. Motor is 5 out of 5 bilaterally.   Lungs:  No increased work of breathing, good air exchange, clear to auscultation bilaterally, no crackles or wheezing  Heart:  Normal apical impulse, regular rate and rhythm, normal S1 and S2, no S3 or S4, and no murmur noted and Normal apical impulse, regular rate and rhythm  Abdomen: Gravid, soft, non-tender, non-distended    Pelvic Exam:   Cervix Check:   DILATION:  1 cm   EFFACEMENT:   50%   STATION:  -5 cm   CONSISTENCY:  firm   POSITION:  posterior      Bedside US: breech    Lab Results:   Blood Type/Rh:    ABO/Rh   Date Value Ref Range Status   11/28/2022 O NEG  Final     Antibody Screen:    Antibody Screen   Date Value Ref Range Status   11/28/2022 NEG  Final     Hemoglobin: Hemoglobin   Date Value Ref Range Status   2022 12.1 12.0 - 16.0 g/dL Final     Hematocrit:   Hematocrit   Date Value Ref Range Status   2022 36.2 (L) 37.0 - 47.0 % Final     Platelet Count:   Platelets   Date Value Ref Range Status   2022 231 130 - 400 K/uL Final       Assessment:  Marcellus Babinski is a 29 y.o. female   At 33w3d   PPPROM    Plan:  [facility] and transfer to [facility] for NICU availability, Dr. Chito Guardado accepting  Risks, benefits, alternatives and possible complications have been discussed in detail with the patient. Admission, transfer and post admission procedures and expectations were discussed in detail. All questions were answered. Ambulance notified.   We have NOT started abx or given betamethasone    Danay Carrion MD

## 2022-12-07 NOTE — FLOWSHEET NOTE
Pt arrives to L&D via wheelchair with c/o SROM at 0200. Pt reports she woke up to go to the bathroom and she had a big gush of fluid. PT states that she is still leaking. PT denies bleeding, but reports mucous like discharge. Amnisure and SVE preformed. Will continue to monitor.

## 2022-12-07 NOTE — H&P
[facility]  Cassandra Del Cid  : 1990  MRN: [id number]  CSN: [id number]    History and Physical    Subjective   Cassandra Del Cid is a 32 y.o. year old  with an Estimated Date of Delivery: 23 currently at 28w4d who presented to [facility] at 0200 with leaking fluid.  Patient reports there was a large gush of fluid, but has had no leading since that time.    Prenatal care has been with Dr. Jackie Chao.  It has been complicated by subchorionic hemorrhage.  Patient reports that last episode of bleeding was about a month ago, but that she was allowed to go home from triage and did not have to spend the night at the hospital.  Patient has a h/o two previous term deliveries, both vaginal and uncomplicated.    OB History    Para Term  AB Living   3 2 2 0 0 2   SAB IAB Ectopic Molar Multiple Live Births   0 0 0 0 0 0      # Outcome Date GA Lbr Toby/2nd Weight Sex Delivery Anes PTL Lv   3 Current            2 Term            1 Term              History reviewed. No pertinent past medical history.  Past Surgical History:   Procedure Laterality Date   • APPENDECTOMY         Current Facility-Administered Medications:   •  ampicillin 2 gm IVPB in 100 ml NS (VTB), 2 g, Intravenous, Q6H, Last Rate: 200 mL/hr at 22, 2 g at 22 **AND** [START ON 2022] amoxicillin (AMOXIL) capsule 500 mg, 500 mg, Oral, Q8H **AND** azithromycin (ZITHROMAX) tablet 1,000 mg, 1,000 mg, Oral, Once, Olive Romero MD  •  betamethasone acetate-betamethasone sodium phosphate (CELESTONE SOLUSPAN) injection 12 mg, 12 mg, Intramuscular, Q24H, Olive Romero MD, 12 mg at 22  •  lactated ringers infusion, 125 mL/hr, Intravenous, Continuous, Olive Romero MD, Last Rate: 125 mL/hr at 22, 125 mL/hr at 22  •  lidocaine PF 1% (XYLOCAINE) injection 5 mL, 5 mL, Intradermal, PRN, Olive Romero MD  •  ondansetron (ZOFRAN) tablet 4 mg, 4 mg, Oral, Q8H PRN **OR** ondansetron  (ZOFRAN) injection 4 mg, 4 mg, Intravenous, Q8H PRN, Olive Romero MD  •  sodium chloride 0.9 % flush 10 mL, 10 mL, Intravenous, Q12H, Olive Romero MD  •  sodium chloride 0.9 % flush 10 mL, 10 mL, Intravenous, PRN, Olive Romero MD    Allergies   Allergen Reactions   • Penicillins GI Intolerance     Social History    Tobacco Use      Smoking status: Never      Smokeless tobacco: Not on file    Review of Systems   Constitutional: Negative for activity change and unexpected weight change.   Respiratory: Negative for shortness of breath.    Cardiovascular: Negative for chest pain.   Gastrointestinal: Negative for abdominal pain.   Genitourinary: Negative for pelvic pain, vaginal bleeding and vaginal discharge (no LOF since arriving here ).         Objective   /65 (BP Location: Left arm, Patient Position: Lying)   Pulse 78   Temp 98 °F (36.7 °C) (Oral)   Resp 16   General: well developed; well nourished  no acute distress   Heart: Not performed.   Lungs: breathing is unlabored   Abdomen: soft, non-tender; no masses  fundus firm and non-tender   FHT's: reactive, reassuring.  Variable decels present, which are expected for gestational age   Cervix: was not checked.   Presentation: breech per verbal report from [facility]   Contractions:  EFW by Leopold's:  EFW by recent u/s: none           Prenatal Labs  Lab Results   Component Value Date    HGB 11.3 (L) 12/07/2022    HEPBSAG Non-Reactive 07/27/2022    ABORH O NEG 11/28/2022    ABO O 12/07/2022    RH Negative 12/07/2022    ABSCRN Positive 12/07/2022    ABID POS, Anti-Tere 07/27/2022    HEPCVIRUSABY Non-Reactive 07/27/2022       Current Labs Reviewed   No data reviewed       Assessment   1. IUP at 28w4d with report of SROM, but not currently leaking any fluid  2. Fetus reassuring  3. Group B strep status: unknown  4. Breech presentation     Plan   1. Amnisure ordered since it seems unlikely that there would not be any LOF when she ambulates to bathroom,  especially if baby is breech  2. Ampicillin, Amoxicillin, Zithromax ordered  3. BTMS ordered  4. Magnesium sulfate not indicated at this time  5. NICU consult and MFM consult, after Amnisure confirms rupture  6. U/S has also been ordered    Olive Romero MD  12/7/2022  08:08 CST

## 2022-12-07 NOTE — CONSULTS
PRENATAL CONSULTATION NOTE     DATE: 2022  MRN: [id number]      Patient Name: Cassandra Del Cid  YOB: 1990        Requesting Physician:  Dr. Romero    EDC: 2023, by Patient Reported    GA: 28w4d    Estimated fetal weight: unknown.    Reason for Consultation: potential premature single at 28 4/7 weeks gestation    Maternal History: 30 y.o.  with hematoma x2, but resolved, now with premature rupture of membranes      Medications:  No recently discontinued medications to reconcile    Consult:  paternal grandfather, paternal grandmother, father and mother available for discussion.  We reviewed the fetal and  aspects of the above conditions to include: estimated survival rate, estimated intact survival rate, respiratory distress syndrome, beneficial effects of steroids, early onset of sepsis, intraventricular hemorrhage, cerebral palsy, retinopathy of prematurity, chronic lung disease, necrotizing enterocolitis, late onset sepsis, apnea of prematurity, anemia of prematurity, feeding difficulty, temperature instability, jaundice and hypoglycemia    Plan for Resuscitation: full resuscitation        Offered estimation of prognosis of potential length of infant hospitalization.     We will plan to attend delivery when requested. I discussed the importance of providing human milk for all infants, especially premature infants. The patient does plan on providing pumped breast milk and/or nursing when appropriate.    I also reviewed policies and procedures for NICU/ICN admission and reviewed structure and function of [facility] - Neonatology at [facility].    Thank you for allowing us to participate in the care of this patient. [facility] is always available for follow-up consultation as indicated. Please do not hesitate to call for additional questions or issues.    Additional Comments: none    Chato Parsons MD  Attending Neonatologist  [facility] - Neonatology  [facility] [place], operated by [facility]  [facility]  22 @ 12:35 CST    Consult note reviewed and electronically signed on 2022 at 12:35 CST    INITIAL HOSPITAL CONSULT:  A total of 40 minutes were spent on counseling and coordination of care including discussion with physicians and nursing, and reviewing the findings and recommendations with the patient and her family of which 30 minutes were spent face-to-face with the patient during this encounter.    Thank you for requesting this consult. Please contact the [facility] on-call  Provider for any further questions/concerns, by calling the NICU at extension [phone].        DISCLAIMER:       “As of 2021, as required by the Federal 21st Century Cures Act, medical records (including provider notes and laboratory/imaging results) are to be made available to patients and/or their designees as soon as the documents are signed/resulted. While the intention is to ensure transparency and to engage patients in their healthcare, this immediate access may create unintended consequences because this document uses language intended for communication between medical providers for interpretation with the entirety of the patient’s clinical picture in mind. It is recommended that patients and/or their designees review all available information with their primary or specialist providers for explanation and to avoid misinterpretation of this information.”

## 2022-12-07 NOTE — FLOWSHEET NOTE
EFM and toco removed from soft nontender abdomen. Pt denies contractions at this time and reports no pain. Pt prepared for transport.

## 2022-12-07 NOTE — FLOWSHEET NOTE
Dr. Ankush Jimenez at bedside for [address] and to discuss POC.

## 2022-12-07 NOTE — FLOWSHEET NOTE
Report called to Marco Steen at [facility], transfer paperwork sent via fax.

## 2022-12-08 PROCEDURE — 25010000002 BETAMETHASONE ACET & SOD PHOS PER 4 MG: Performed by: OBSTETRICS & GYNECOLOGY

## 2022-12-08 PROCEDURE — 25010000002 AMPICILLIN PER 500 MG: Performed by: OBSTETRICS & GYNECOLOGY

## 2022-12-08 RX ORDER — SODIUM CHLORIDE, SODIUM LACTATE, POTASSIUM CHLORIDE, CALCIUM CHLORIDE 600; 310; 30; 20 MG/100ML; MG/100ML; MG/100ML; MG/100ML
50 INJECTION, SOLUTION INTRAVENOUS CONTINUOUS
Status: DISCONTINUED | OUTPATIENT
Start: 2022-12-08 | End: 2022-12-08

## 2022-12-08 RX ORDER — SODIUM CHLORIDE, SODIUM LACTATE, POTASSIUM CHLORIDE, CALCIUM CHLORIDE 600; 310; 30; 20 MG/100ML; MG/100ML; MG/100ML; MG/100ML
50 INJECTION, SOLUTION INTRAVENOUS EVERY 6 HOURS PRN
Status: DISCONTINUED | OUTPATIENT
Start: 2022-12-08 | End: 2022-12-20

## 2022-12-08 RX ADMIN — AMPICILLIN 2 G: 2 INJECTION, POWDER, FOR SOLUTION INTRAVENOUS at 02:47

## 2022-12-08 RX ADMIN — Medication 10 ML: at 21:18

## 2022-12-08 RX ADMIN — SODIUM CHLORIDE, POTASSIUM CHLORIDE, SODIUM LACTATE AND CALCIUM CHLORIDE 125 ML/HR: 600; 310; 30; 20 INJECTION, SOLUTION INTRAVENOUS at 07:19

## 2022-12-08 RX ADMIN — AMPICILLIN 2 G: 2 INJECTION, POWDER, FOR SOLUTION INTRAVENOUS at 09:24

## 2022-12-08 RX ADMIN — AMPICILLIN 2 G: 2 INJECTION, POWDER, FOR SOLUTION INTRAVENOUS at 15:21

## 2022-12-08 RX ADMIN — SODIUM CHLORIDE, POTASSIUM CHLORIDE, SODIUM LACTATE AND CALCIUM CHLORIDE 999 ML/HR: 600; 310; 30; 20 INJECTION, SOLUTION INTRAVENOUS at 01:36

## 2022-12-08 RX ADMIN — BETAMETHASONE SODIUM PHOSPHATE AND BETAMETHASONE ACETATE 12 MG: 3; 3 INJECTION, SUSPENSION INTRA-ARTICULAR; INTRALESIONAL; INTRAMUSCULAR at 06:31

## 2022-12-08 RX ADMIN — Medication 10 ML: at 09:23

## 2022-12-08 RX ADMIN — AMPICILLIN 2 G: 2 INJECTION, POWDER, FOR SOLUTION INTRAVENOUS at 21:18

## 2022-12-08 NOTE — PAYOR COMM NOTE
REF:  [id number]    [facility]  SHAYLA,   [phone]  OR  FAX   [phone]    Cassandra Del Cid (32 y.o. Female)     Date of Birth   1990    Social Security Number       Address   [address] [place] [address]    Home Phone   [phone]    MRN   [id number]       Yazidism   [facility]    Marital Status                               Admission Date   22    Admission Type   Elective    Admitting Provider   Olive Romero MD    Attending Provider   Olive Romero MD    Department, Room/Bed   [facility] LABOR DELIVERY, L03       Discharge Date       Discharge Disposition       Discharge Destination                               Attending Provider: Olive Romero MD    Allergies: Penicillins    Isolation: None   Infection: None   Code Status: CPR    Ht: 162.6 cm (64\")   Wt: --    Admission Cmt: None   Principal Problem: Threatened premature labor [O47.00]                 Active Insurance as of 2022     Primary Coverage     Payor Plan Insurance Group Employer/Plan Group    ANTHEM BLUE CROSS ANTHEM BLUE CROSS BLUE [facility] PPO [id number]     Payor Plan Address Payor Plan Phone Number Payor Plan Fax Number Effective Dates    [address] [phone]  2022 - None Entered    [place] [address]       Subscriber Name Subscriber Birth Date Member ID       EUGENIA DEL CID 1992 [id number]                 Emergency Contacts      (Rel.) Home Phone Work Phone Mobile Phone    Eugenia Del Cid (Spouse) [phone] -- --               History & Physical      Olive Romero MD at 22 [address] [place] [address]  Cassandra Del Cid  : 1990  MRN: [id number]  CSN: [id number]    History and Physical    Subjective    Cassandra Del Cid is a 32 y.o. year old  with an Estimated Date of Delivery: 23 currently at 28w4d who presented to [facility] at 0200 with leaking fluid.  Patient reports there was a large gush of fluid, but has had no leading since  that time.    Prenatal care has been with Dr. Jackie Chao.  It has been complicated by subchorionic hemorrhage.  Patient reports that last episode of bleeding was about a month ago, but that she was allowed to go home from triage and did not have to spend the night at the hospital.  Patient has a h/o two previous term deliveries, both vaginal and uncomplicated.    OB History    Para Term  AB Living   3 2 2 0 0 2   SAB IAB Ectopic Molar Multiple Live Births   0 0 0 0 0 0      # Outcome Date GA Lbr Toby/2nd Weight Sex Delivery Anes PTL Lv   3 Current            2 Term            1 Term              History reviewed. No pertinent past medical history.  Past Surgical History:   Procedure Laterality Date   • APPENDECTOMY         Current Facility-Administered Medications:   •  ampicillin 2 gm IVPB in 100 ml NS (VTB), 2 g, Intravenous, Q6H, Last Rate: 200 mL/hr at 2225, 2 g at 22 **AND** [START ON 2022] amoxicillin (AMOXIL) capsule 500 mg, 500 mg, Oral, Q8H **AND** azithromycin (ZITHROMAX) tablet 1,000 mg, 1,000 mg, Oral, Once, Olive Romero MD  •  betamethasone acetate-betamethasone sodium phosphate (CELESTONE SOLUSPAN) injection 12 mg, 12 mg, Intramuscular, Q24H, Olive Romero MD, 12 mg at 22  •  lactated ringers infusion, 125 mL/hr, Intravenous, Continuous, Olive Romero MD, Last Rate: 125 mL/hr at 2218, 125 mL/hr at 22  •  lidocaine PF 1% (XYLOCAINE) injection 5 mL, 5 mL, Intradermal, PRN, Olive Romero MD  •  ondansetron (ZOFRAN) tablet 4 mg, 4 mg, Oral, Q8H PRN **OR** ondansetron (ZOFRAN) injection 4 mg, 4 mg, Intravenous, Q8H PRN, Olive Romero MD  •  sodium chloride 0.9 % flush 10 mL, 10 mL, Intravenous, Q12H, Olive Romero MD  •  sodium chloride 0.9 % flush 10 mL, 10 mL, Intravenous, PRN, Olive Romero MD    Allergies   Allergen Reactions   • Penicillins GI Intolerance     Social History    Tobacco Use      Smoking status:  Never      Smokeless tobacco: Not on file    Review of Systems   Constitutional: Negative for activity change and unexpected weight change.   Respiratory: Negative for shortness of breath.    Cardiovascular: Negative for chest pain.   Gastrointestinal: Negative for abdominal pain.   Genitourinary: Negative for pelvic pain, vaginal bleeding and vaginal discharge (no LOF since arriving here ).        Objective    /65 (BP Location: Left arm, Patient Position: Lying)   Pulse 78   Temp 98 °F (36.7 °C) (Oral)   Resp 16   General: well developed; well nourished  no acute distress   Heart: Not performed.   Lungs: breathing is unlabored   Abdomen: soft, non-tender; no masses  fundus firm and non-tender   FHT's: reactive, reassuring.  Variable decels present, which are expected for gestational age   Cervix: was not checked.   Presentation: breech per verbal report from [facility]   Contractions:  EFW by Leopold's:  EFW by recent u/s: none           Prenatal Labs  Lab Results   Component Value Date    HGB 11.3 (L) 12/07/2022    HEPBSAG Non-Reactive 07/27/2022    ABORH O NEG 11/28/2022    ABO O 12/07/2022    RH Negative 12/07/2022    ABSCRN Positive 12/07/2022    ABID POS, Anti-Tere 07/27/2022    HEPCVIRUSABY Non-Reactive 07/27/2022       Current Labs Reviewed   No data reviewed      Assessment    1. IUP at 28w4d with report of SROM, but not currently leaking any fluid  2. Fetus reassuring  3. Group B strep status: unknown  4. Breech presentation    Plan    1. Amnisure ordered since it seems unlikely that there would not be any LOF when she ambulates to bathroom, especially if baby is breech  2. Ampicillin, Amoxicillin, Zithromax ordered  3. BTMS ordered  4. Magnesium sulfate not indicated at this time  5. NICU consult and MFM consult, after Amnisure confirms rupture  6. U/S has also been ordered    Olive Romero MD  12/7/2022  08:08 CST      Electronically signed by Olive Romero MD at 12/07/22 0835       Vital Signs  (last 2 days)     Date/Time Temp Temp src Pulse Resp BP Patient Position    12/08/22 0724 97.8 (36.6) Oral 73 16 96/50 Lying    12/08/22 0631 -- -- 76 -- 93/54 --    12/08/22 0630 -- -- 76 16 93/54 Lying    12/08/22 0500 97.5 (36.4) Temporal 77 16 85/48 Lying    12/08/22 0401 -- -- 79 16 90/47 Lying    12/08/22 0301 96.7 (35.9) Temporal 79 18 86/49 Lying    12/08/22 0244 -- -- 81 -- 94/44 --    12/08/22 0130 97.7 (36.5) Temporal -- -- -- --    12/08/22 0101 -- -- 89 -- 83/41 Lying    12/08/22 0044 -- -- 80 16 89/47 Lying    12/07/22 2321 97.5 (36.4) Temporal 86 16 101/40 Lying    12/07/22 1937 97.5 (36.4) Oral 90 16 100/68 Lying    12/07/22 1516 98 (36.7) Oral -- -- -- --    12/07/22 1400 -- -- 90 -- 105/64 --    12/07/22 1359 98 (36.7) Oral -- -- -- --    12/07/22 1056 98 (36.7) Oral -- -- -- --    12/07/22 0921 -- -- 88 16 99/53 Lying    12/07/22 0732 98.4 (36.9) Temporal -- -- -- --    12/07/22 0529 98 (36.7) Oral 78 16 113/65 Lying        Intake & Output (last 2 days)       12/06 0701 12/07 0700 12/07 0701 12/08 0700 12/08 0701 12/09 0700           Urine Unmeasured Occurrence  2 x           Facility-Administered Medications as of 12/8/2022   Medication Dose Route Frequency Provider Last Rate Last Admin   • ampicillin 2 gm IVPB in 100 ml NS (VTB)  2 g Intravenous Q6H Olive Romero  mL/hr at 12/08/22 0924 2 g at 12/08/22 0924    And   • [START ON 12/9/2022] amoxicillin (AMOXIL) capsule 500 mg  500 mg Oral Q8H Olive Romero MD        And   • [COMPLETED] azithromycin (ZITHROMAX) tablet 1,000 mg  1,000 mg Oral Once Olive Romero MD   1,000 mg at 12/07/22 0922   • [COMPLETED] betamethasone acetate-betamethasone sodium phosphate (CELESTONE SOLUSPAN) injection 12 mg  12 mg Intramuscular Q24H Olive Romero MD   12 mg at 12/08/22 0631   • lactated ringers infusion  125 mL/hr Intravenous Continuous Olive Romero  mL/hr at 12/08/22 0924 125 mL/hr at 12/08/22 0924   • lidocaine PF 1% (XYLOCAINE)  injection 5 mL  5 mL Intradermal PRN Olive Romero MD       • ondansetron (ZOFRAN) tablet 4 mg  4 mg Oral Q8H PRN Olive Romero MD        Or   • ondansetron (ZOFRAN) injection 4 mg  4 mg Intravenous Q8H PRN Olive Romero MD       • sodium chloride 0.9 % flush 10 mL  10 mL Intravenous Q12H Olive Romero MD   10 mL at 12/08/22 0929   • sodium chloride 0.9 % flush 10 mL  10 mL Intravenous PRN Olive Romero MD           Orders (last 48 hrs)      Start     Ordered    12/09/22 0545  amoxicillin (AMOXIL) capsule 500 mg  Every 8 Hours        See Hyperspace for full Linked Orders Report.    12/07/22 0550    12/07/22 0933  Inpatient Consult to Neonatology  Once        Specialty:  Neonatology  Provider:  (Not yet assigned)    12/07/22 0932    12/07/22 0933  Code Status and Medical Interventions:  Continuous         12/07/22 0932    12/07/22 0932  Inpatient Maternal & Fetal Medicine Consult  Once        Specialty:  Maternal and Fetal Medicine  Provider:  (Not yet assigned)    12/07/22 0932    12/07/22 0910  US Ob Limited 1 + Fetuses  1 Time Imaging         12/07/22 0820    12/07/22 0900  sodium chloride 0.9 % flush 10 mL  Every 12 Hours Scheduled         12/07/22 0550    12/07/22 0856  Antibody Identification  Once         12/07/22 0855    12/07/22 0855  Prepare RBC, 2 Units  Blood - Once         12/07/22 0854    12/07/22 0821  Rapid Assay For ROM - Amniotic Fluid, Amniotic Sac  STAT         12/07/22 0820    12/07/22 0819  US Ob 14 + Weeks Single or First Gestation  1 Time Imaging,   Status:  Canceled         12/07/22 0820    12/07/22 0805  Diet: Regular/House Diet; Texture: Regular Texture (IDDSI 7); Fluid Consistency: Thin (IDDSI 0)  Diet Effective Now         12/07/22 0816    12/07/22 0741  Inpatient Consult to Neonatology  Once        Specialty:  Neonatology  Provider:  (Not yet assigned)    12/07/22 0741    12/07/22 0645  lactated ringers infusion  Continuous         12/07/22 0550    12/07/22 0645  ampicillin 2  gm IVPB in 100 ml NS (VTB)  Every 6 Hours        See Hyperspace for full Linked Orders Report.    22 0550    2245  azithromycin (ZITHROMAX) tablet 1,000 mg  Once        See Hyperspace for full Linked Orders Report.    22 0550    22 0645  betamethasone acetate-betamethasone sodium phosphate (CELESTONE SOLUSPAN) injection 12 mg  Every 24 Hours         22 0550    22 0546  ondansetron (ZOFRAN) tablet 4 mg  Every 8 Hours PRN        See Hyperspace for full Linked Orders Report.    22 0550    22 0546  ondansetron (ZOFRAN) injection 4 mg  Every 8 Hours PRN        See Hyperspace for full Linked Orders Report.    22 0550    22 0544  NPO Diet NPO Type: Strict NPO  Diet Effective Now,   Status:  Canceled         2250    2243  External Uterine Contraction Monitoring  Per Hospital Policy         22 0550    22 0543  Continuous Fetal Monitoring With NST on Admission and Prior to Initiation of Oxytocin.  Per Order Details        Comments: Continuous Fetal Monitoring With NST on Admission & Prior to Initiation of Oxytocin.    22 0550    2242  Admit To Obstetrics Inpatient  Once         22 0550    22 0542  Vital Signs Per Hospital Policy  Per Hospital Policy         22 0550    2242  Notify Provider (Specified)  Until Discontinued         2250    2242  Notify Provider of Tachysystole (Per Hospital Algorithm)  Until Discontinued         22 0550    22 0542  Notify Provider if Membranes Ruptured, Bleeding Greater Than 1 Pad Per Hour, Fetal Heart Tone Abnormality or Severe Pain  Until Discontinued         2250    22 0542  Initiate Group Beta Strep (GBS) Prophylaxis Protocol, If Criteria Met  Continuous        Comments: NO TREATMENT RECOMMENDED IF: 1) Maternal GBS Status Known Negative 2) Scheduled  Birth With Intact Membranes, Not in Labor 3) Maternal GBS  Status Unknown, No Risk Factors  TREAT WITH ANTIBIOTICS IF:  1) Maternal GBS Status Known Positive 2) Maternal GBS Status Unknown With Risk Factors: a)  Previous Infant Affected By GBS Infection b) GBS Urinary Tract Infection (UTI) or Bacteriuria During Pregnancy c) Unexplained Maternal Fever (100.4F (38C) or Greater) During Labor d)  Prolonged Rupture of Membranes (18 or More Hours) e) Gestational Age Less Than 37 Weeks    22 0550    22 0542  CBC (No Diff)  Once         22 0550    22 0542  Type & Screen  Once         22 0550    22 0542  Insert Peripheral IV  Once         22 0550    22 0542  Saline Lock & Maintain IV Access  Continuous         22 0550    22 0542  Place Sequential Compression Device  Once         22 0550    22 0542  Maintain Sequential Compression Device  Continuous         22 0550    22 0541  lidocaine PF 1% (XYLOCAINE) injection 5 mL  As Needed         22 0550    22 0541  sodium chloride 0.9 % flush 10 mL  As Needed         22 0550    Unscheduled  Position Change - For Intra-Uterine Resusitation for Hypertonus, HyperStimulation or Non-Reassuring Fetal Status  As Needed       22 0550    --  prenatal vitamin (prenatal, CLASSIC, vitamin) tablet  Daily         22 0532                 Consult Notes (last 48 hours)      Luis Alberto Ryan MD at 22 1409      Consult Orders    1. Inpatient Maternal & Fetal Medicine Consult [[id number]] ordered by Olive Romero MD at 22 0932               [facility] consult    Patient Active Problem List    Diagnosis Date Noted   •  premature rupture of membranes (PPROM) with unknown onset of labor 2022   • Vaginal bleeding during pregnancy, antepartum 2022   • [facility] isoimmunization during pregnancy 08/10/2022      premature rupture of membranes (PPROM) with unknown onset of labor  33yo   Currently 27   weeks    Multiple bleeding episodes this pregnancy.   Two in the first trimester followed by some bleeding ~14 weeks.   Was stable until a fairly significant episode ~22 weeks. Evaluated at [facility] at the time.     No bleeding for ~4 weeks but had PROM earlier today  Currently without signs of labor or infection  VSS  On latency antibiotics and receiving a BMTZ course  Has been seen by Dr Parsons from neonatology as well.     An US was performed today.   Please see AS for the full report.   Breech presentation.   Overall size is consistent with dates. Interval growth   appropriate. EFW 1125g   Anatomy reviewed; no structural anomalies visualized.   Normal fluid pocket at this time.   BPP 8/8        Findings discussed with the couple.     We discussed the issues created with PROM at this gestational age.   Primary concern is for infection  If there is clinical concern for infection, delivery would be required regardless of gestational age.   We discussed the reasons behind the use of latency antibiotics and the betamethasone.     Discussed the case with Dr Parsons as well. He, based upon the current clinical picture, mom's gestational and the current EFW that keeping Cassandra here at this facility is reasonable with the capabilities he has in this nursery.     Recommend:  Continuing the latency antibiotics. 48hrs IV, 5 days of oral then d/c  Completing the BMTZ course, second dose at 48 hours.   Inpatient management until delivery secondary to the potential for maternal and/or fetal status to change rapidly  Daily assessment of maternal and fetal status.   If clinical concern for infection or labor-->deliver  If happens to reach 34.0 weeks, recommend delivery at that time for risk of continued in utero management then outweighs potential benefits to the baby.   Will do weekly BPPs  Will check-in with the couple when I am on campus.     All questions answered. Time on floor/unit for chart review, patient evaluation,  discussion, charting and coordination of care was >40minutes with >50% in face to face counseling and coordination of care.       Tere isoimmunization during pregnancy  Last check of her titer 4 weeks ago. Remains <2      As always, if there are any questions/concerns, please do not hesitate to contact me.   Thanks  Ana  [phone]        Use of magnesium for \"neuroprotection\" is indicated if delivery is 32.0 weeks or less. The benefit from this therapy is to have an elevated magnesium level at the time of birth. With her not being in labor at this time, magnesium infusion not needed. Would restart if delivery is indicated prior to 32.0 weeks.           Electronically signed by Luis Alberto Ryan MD at 22 1412     Chato Parsons MD at 22 1232      Consult Orders    1. Inpatient Consult to Neonatology [[id number]] ordered by Olive Romero MD at 22 0932                PRENATAL CONSULTATION NOTE     DATE: 2022  MRN: [id number]      Patient Name: Cassandra Del Cid  YOB: 1990        Requesting Physician:  Dr. Romero    EDC: 2023, by Patient Reported    GA: 28w4d    Estimated fetal weight: unknown.    Reason for Consultation: potential premature single at 28 4/7 weeks gestation    Maternal History: 32 y.o.  with hematoma x2, but resolved, now with premature rupture of membranes      Medications:  No recently discontinued medications to reconcile    Consult:  paternal grandfather, paternal grandmother, father and mother available for discussion.  We reviewed the fetal and  aspects of the above conditions to include: estimated survival rate, estimated intact survival rate, respiratory distress syndrome, beneficial effects of steroids, early onset of sepsis, intraventricular hemorrhage, cerebral palsy, retinopathy of prematurity, chronic lung disease, necrotizing enterocolitis, late onset sepsis, apnea of prematurity, anemia of prematurity, feeding  difficulty, temperature instability, jaundice and hypoglycemia    Plan for Resuscitation: full resuscitation        Offered estimation of prognosis of potential length of infant hospitalization.     We will plan to attend delivery when requested. I discussed the importance of providing human milk for all infants, especially premature infants. The patient does plan on providing pumped breast milk and/or nursing when appropriate.    I also reviewed policies and procedures for NICU/ICN admission and reviewed structure and function of [facility] - Neonatology at [facility].    Thank you for allowing us to participate in the care of this patient. [facility] is always available for follow-up consultation as indicated. Please do not hesitate to call for additional questions or issues.    Additional Comments: none    Chato Parsons MD  Attending Neonatologist  [facility] - Neonatology  [facility]  22 @ 12:35 CST    Consult note reviewed and electronically signed on 2022 at 12:35 CST    INITIAL HOSPITAL CONSULT:  A total of 40 minutes were spent on counseling and coordination of care including discussion with physicians and nursing, and reviewing the findings and recommendations with the patient and her family of which 30 minutes were spent face-to-face with the patient during this encounter.    Thank you for requesting this consult. Please contact the [facility] on-call  Provider for any further questions/concerns, by calling the NICU at extension [phone].        DISCLAIMER:       “As of 2021, as required by the Federal 21st Century Cures Act, medical records (including provider notes and laboratory/imaging results) are to be made available to patients and/or their designees as soon as the documents are signed/resulted. While the intention is to ensure transparency and to engage patients in their healthcare, this immediate access may create unintended consequences because this  document uses language intended for communication between medical providers for interpretation with the entirety of the patient’s clinical picture in mind. It is recommended that patients and/or their designees review all available information with their primary or specialist providers for explanation and to avoid misinterpretation of this information.”          Electronically signed by Chato Parsons MD at 12/07/22 6700

## 2022-12-09 PROCEDURE — 99232 SBSQ HOSP IP/OBS MODERATE 35: CPT | Performed by: OBSTETRICS & GYNECOLOGY

## 2022-12-09 PROCEDURE — 25010000002 AMPICILLIN PER 500 MG: Performed by: OBSTETRICS & GYNECOLOGY

## 2022-12-09 RX ADMIN — AMOXICILLIN 500 MG: 500 CAPSULE ORAL at 05:31

## 2022-12-09 RX ADMIN — Medication 10 ML: at 10:07

## 2022-12-09 RX ADMIN — AMOXICILLIN 500 MG: 500 CAPSULE ORAL at 13:29

## 2022-12-09 RX ADMIN — AMOXICILLIN 500 MG: 500 CAPSULE ORAL at 21:40

## 2022-12-09 RX ADMIN — Medication 10 ML: at 21:42

## 2022-12-09 RX ADMIN — AMPICILLIN 2 G: 2 INJECTION, POWDER, FOR SOLUTION INTRAVENOUS at 04:29

## 2022-12-09 NOTE — PROGRESS NOTES
[facility]     Progress Note    Patient Name: Cassandra Del Cid  : 1990  MRN: [id number]  Primary Care Physician:  Magaly Hatch APRN  Date of admission: 2022    Subjective   Subjective     Chief Complaint: , premature rupture of membranes    History of Present Illness  Patient seen and examined.  Events noted.  Chart reviewed.    Patient was admitted as a transfer from [facility] in [place], [place].  She was admitted 2 days ago secondary to the diagnosis of  premature rupture membranes  She has completed a course of steroids  She never required magnesium sulfate  She has started her IV antibiotics and is scheduled to start oral antibiotics today  She reports continued leakage of fluid although it is less  She denies any vaginal bleeding or contractions or pain  She reports active fetal movement    Patient was discussed with nursing staff  I was informed that she had a couple of variable decelerations as well as prolonged decelerations overnight  Otherwise, the report was normal  Rupture of Membranes  This is a new problem. The current episode started in the past 7 days. The problem occurs constantly. The problem has been gradually improving. Pertinent negatives include no abdominal pain or fever. Nothing aggravates the symptoms.     Patient Reports no new complaints    Review of Systems   Constitutional: Negative for fever.   Gastrointestinal: Negative for abdominal pain.   Genitourinary:        Leakage of fluid   All other systems reviewed and are negative.      Objective   Objective     Vitals:   Temp:  [97.6 °F (36.4 °C)-98.1 °F (36.7 °C)] 97.6 °F (36.4 °C)  Heart Rate:  [73-96] 76  Resp:  [16-18] 16  BP: ()/(49-68) 91/56    Physical Exam  Vitals and nursing note reviewed. Exam conducted with a chaperone present.   Constitutional:       Appearance: Normal appearance. She is normal weight.   HENT:      Head: Normocephalic.   Cardiovascular:      Rate and Rhythm:  Normal rate and regular rhythm.      Pulses: Normal pulses.   Pulmonary:      Effort: Pulmonary effort is normal.   Abdominal:      Comments: Soft.  Gravid fundus consistent with gestational age.  No fundal tenderness.     Category 2 tracing noted.  Episodic decelerations as well as 1 or 2 prolonged decelerations noted overnight  Tocometer is quiet     Result Review    Result Review:  I have personally reviewed the results from the time of this admission to 2022 06:51 CST and agree with these findings:  [x]  Laboratory list / accordion  []  Microbiology  []  Radiology  []  EKG/Telemetry   []  Cardiology/Vascular   []  Pathology  []  Old records  []  Other:  Most notable findings include: Normal white blood cell count on admission      Assessment & Plan   Assessment / Plan     Brief Patient Summary:  Cassandra Del Cid is a 32 y.o. female who admitted with  premature rupture membranes at 28 weeks.  Status post betamethasone.    Active Hospital Problems:  Active Hospital Problems    Diagnosis    • **Threatened premature labor      Plan:   Continue labor and delivery management for now given category 2 tracing  Continue antibiotics  Encourage compliance with SCDs while in bed  If delivery prior to 32 weeks, will need magnesium sulfate neuro protection  If remains breech at delivery is indicated, will require  section  The above plan was outlined to the patient and she verbalizes understanding    DVT prophylaxis:  Mechanical DVT prophylaxis orders are present.    CODE STATUS:    Level Of Support Discussed With: Patient  Code Status (Patient has no pulse and is not breathing): CPR (Attempt to Resuscitate)  Medical Interventions (Patient has pulse or is breathing): Full Support  Release to patient: Routine Release    Disposition:  I expect patient to be discharged following delivery.    Ethan Betancourt MD

## 2022-12-09 NOTE — PLAN OF CARE
Problem: Adult Inpatient Plan of Care  Goal: Plan of Care Review  Outcome: Ongoing, Progressing  Flowsheets (Taken 12/9/2022 0524)  Plan of Care Reviewed With: patient  Outcome Evaluation: Patient has rested throughout the night and has no complaints of pain. VSS. MD aware of decels and some minimal variablity. Patients stated no leaking and no wet pad through the night.  Goal: Patient-Specific Goal (Individualized)  Outcome: Ongoing, Progressing  Goal: Absence of Hospital-Acquired Illness or Injury  Outcome: Ongoing, Progressing  Intervention: Identify and Manage Fall Risk  Recent Flowsheet Documentation  Taken 12/8/2022 1920 by Christie Rodriguez RN  Safety Promotion/Fall Prevention: assistive device/personal items within reach  Intervention: Prevent Skin Injury  Recent Flowsheet Documentation  Taken 12/8/2022 1920 by Christie Rodriguez RN  Body Position: sitting up in bed  Intervention: Prevent and Manage VTE (Venous Thromboembolism) Risk  Recent Flowsheet Documentation  Taken 12/8/2022 1920 by Christie Rodriguez RN  Activity Management: activity adjusted per tolerance  VTE Prevention/Management:   bilateral   sequential compression devices on  Goal: Optimal Comfort and Wellbeing  Outcome: Ongoing, Progressing  Intervention: Provide Person-Centered Care  Recent Flowsheet Documentation  Taken 12/8/2022 1920 by Christie Rodriguez RN  Trust Relationship/Rapport:   care explained   choices provided   emotional support provided   thoughts/feelings acknowledged   reassurance provided  Goal: Readiness for Transition of Care  Outcome: Ongoing, Progressing     Problem: Prelabor Rupture of Membranes  Goal: Delayed Delivery with Absence of Infection  Outcome: Ongoing, Progressing   Goal Outcome Evaluation:  Plan of Care Reviewed With: patient           Outcome Evaluation: Patient has rested throughout the night and has no complaints of pain. VSS. MD aware of decels and some minimal variablity. Patients stated no leaking and no wet pad  through the night.

## 2022-12-10 PROBLEM — O42.919 PRETERM PREMATURE RUPTURE OF MEMBRANES (PPROM) WITH UNKNOWN ONSET OF LABOR: Status: ACTIVE | Noted: 2022-12-10

## 2022-12-10 LAB
ABO GROUP BLD: NORMAL
BASOPHILS # BLD AUTO: 0.04 10*3/MM3 (ref 0–0.2)
BASOPHILS NFR BLD AUTO: 0.5 % (ref 0–1.5)
BH BB BLOOD EXPIRATION DATE: NORMAL
BH BB BLOOD EXPIRATION DATE: NORMAL
BH BB BLOOD TYPE BARCODE: 9500
BH BB BLOOD TYPE BARCODE: 9500
BH BB DISPENSE STATUS: NORMAL
BH BB DISPENSE STATUS: NORMAL
BH BB PRODUCT CODE: NORMAL
BH BB PRODUCT CODE: NORMAL
BH BB UNIT NUMBER: NORMAL
BH BB UNIT NUMBER: NORMAL
BLD GP AB SCN SERPL QL: POSITIVE
CROSSMATCH INTERPRETATION: NORMAL
CROSSMATCH INTERPRETATION: NORMAL
DEPRECATED RDW RBC AUTO: 50.4 FL (ref 37–54)
EOSINOPHIL # BLD AUTO: 0.04 10*3/MM3 (ref 0–0.4)
EOSINOPHIL NFR BLD AUTO: 0.5 % (ref 0.3–6.2)
ERYTHROCYTE [DISTWIDTH] IN BLOOD BY AUTOMATED COUNT: 13.7 % (ref 12.3–15.4)
HCT VFR BLD AUTO: 35.6 % (ref 34–46.6)
HGB BLD-MCNC: 11.3 G/DL (ref 12–15.9)
IMM GRANULOCYTES # BLD AUTO: 0.18 10*3/MM3 (ref 0–0.05)
IMM GRANULOCYTES NFR BLD AUTO: 2.1 % (ref 0–0.5)
LYMPHOCYTES # BLD AUTO: 1.38 10*3/MM3 (ref 0.7–3.1)
LYMPHOCYTES NFR BLD AUTO: 16.4 % (ref 19.6–45.3)
MCH RBC QN AUTO: 31.7 PG (ref 26.6–33)
MCHC RBC AUTO-ENTMCNC: 31.7 G/DL (ref 31.5–35.7)
MCV RBC AUTO: 100 FL (ref 79–97)
MONOCYTES # BLD AUTO: 0.6 10*3/MM3 (ref 0.1–0.9)
MONOCYTES NFR BLD AUTO: 7.1 % (ref 5–12)
NEUTROPHILS NFR BLD AUTO: 6.18 10*3/MM3 (ref 1.7–7)
NEUTROPHILS NFR BLD AUTO: 73.4 % (ref 42.7–76)
NRBC BLD AUTO-RTO: 0 /100 WBC (ref 0–0.2)
PLATELET # BLD AUTO: 214 10*3/MM3 (ref 140–450)
PMV BLD AUTO: 9.7 FL (ref 6–12)
RBC # BLD AUTO: 3.56 10*6/MM3 (ref 3.77–5.28)
RESIDUAL RHIG DETECTED: NORMAL
RH BLD: NEGATIVE
T&S EXPIRATION DATE: NORMAL
UNIT  ABO: NORMAL
UNIT  ABO: NORMAL
UNIT  RH: NORMAL
UNIT  RH: NORMAL
WBC NRBC COR # BLD: 8.42 10*3/MM3 (ref 3.4–10.8)

## 2022-12-10 PROCEDURE — 99231 SBSQ HOSP IP/OBS SF/LOW 25: CPT | Performed by: OBSTETRICS & GYNECOLOGY

## 2022-12-10 PROCEDURE — 86922 COMPATIBILITY TEST ANTIGLOB: CPT

## 2022-12-10 PROCEDURE — 86900 BLOOD TYPING SEROLOGIC ABO: CPT | Performed by: OBSTETRICS & GYNECOLOGY

## 2022-12-10 PROCEDURE — 86901 BLOOD TYPING SEROLOGIC RH(D): CPT | Performed by: OBSTETRICS & GYNECOLOGY

## 2022-12-10 PROCEDURE — 86920 COMPATIBILITY TEST SPIN: CPT

## 2022-12-10 PROCEDURE — 86870 RBC ANTIBODY IDENTIFICATION: CPT | Performed by: OBSTETRICS & GYNECOLOGY

## 2022-12-10 PROCEDURE — 85025 COMPLETE CBC W/AUTO DIFF WBC: CPT | Performed by: OBSTETRICS & GYNECOLOGY

## 2022-12-10 PROCEDURE — 86850 RBC ANTIBODY SCREEN: CPT | Performed by: OBSTETRICS & GYNECOLOGY

## 2022-12-10 RX ADMIN — AMOXICILLIN 500 MG: 500 CAPSULE ORAL at 17:16

## 2022-12-10 RX ADMIN — AMOXICILLIN 500 MG: 500 CAPSULE ORAL at 05:34

## 2022-12-10 RX ADMIN — Medication 10 ML: at 21:30

## 2022-12-10 NOTE — PROGRESS NOTES
[facility]     Progress Note    Patient Name: Cassandra Del Cid  : 1990  MRN: [id number]  Primary Care Physician:  Magaly Hatch APRN  Date of admission: 2022    Subjective   Subjective     Chief Complaint: PPROM    History of Present Illness  Patient seen and examined.  Chart reviewed.  Events noted.  Discussed with nursing staff.    Patient without new complaints.  She continues to report active fetal movement.  She continues to leak small amounts of fluid.  She is having no vaginal bleeding or contractions.    Patient Reports no new symptoms    Review of Systems   Genitourinary:        Leakage of fluid   All other systems reviewed and are negative.      Objective   Objective     Vitals:   Temp:  [97.2 °F (36.2 °C)-98.8 °F (37.1 °C)] 98 °F (36.7 °C)  Heart Rate:  [] 76  Resp:  [16-18] 16  BP: ()/(45-58) 101/55    Physical Exam  Vitals and nursing note reviewed. Exam conducted with a chaperone present.   Constitutional:       Appearance: Normal appearance. She is normal weight.   HENT:      Head: Normocephalic.   Abdominal:      Comments: Fundus nontender.  Fundus consistent with stated gestational age.   Musculoskeletal:         General: Normal range of motion.   Skin:     General: Skin is warm.   Neurological:      General: No focal deficit present.      Mental Status: She is oriented to person, place, and time. Mental status is at baseline.   Psychiatric:         Mood and Affect: Mood normal.         Behavior: Behavior normal.         Thought Content: Thought content normal.         Judgment: Judgment normal.     Category 2 NST.  Occasional periodic decelerations with normal variability  Tocometer is quiet     Result Review    Result Review:  I have personally reviewed the results from the time of this admission to 12/10/2022 08:22 CST and agree with these findings:  []  Laboratory list / accordion  []  Microbiology  []  Radiology  []  EKG/Telemetry   []  Cardiology/Vascular   []   Pathology  []  Old records  []  Other:  Most notable findings include: Labs are pending this morning      Assessment & Plan   Assessment / Plan     Brief Patient Summary:  Cassandra Del Cid is a 32 y.o. female who admitted with  premature rupture membranes.  Status post betamethasone x2.  Continues on antibiotics.    Active Hospital Problems:  Active Hospital Problems    Diagnosis    • **Threatened premature labor    •  premature rupture of membranes (PPROM) with unknown onset of labor    • Breech presentation with  problem      Plan:   Expectant management  Continuous monitoring for now    DVT prophylaxis:  Mechanical DVT prophylaxis orders are present.    CODE STATUS:    Level Of Support Discussed With: Patient  Code Status (Patient has no pulse and is not breathing): CPR (Attempt to Resuscitate)  Medical Interventions (Patient has pulse or is breathing): Full Support  Release to patient: Routine Release    Disposition:  I expect patient to be discharged following delivery.    Ethan Betancourt MD

## 2022-12-10 NOTE — PLAN OF CARE
Problem: Adult Inpatient Plan of Care  Goal: Plan of Care Review  Outcome: Ongoing, Progressing  Flowsheets (Taken 12/10/2022 1655)  Progress: no change  Plan of Care Reviewed With:   patient   spouse  Goal: Patient-Specific Goal (Individualized)  Outcome: Ongoing, Progressing  Flowsheets (Taken 12/10/2022 1655)  Individualized Care Needs: NICU consult completed. Temperature q2h  Anxieties, Fears or Concerns: Wants to remain pregnant as long as possible  Goal: Absence of Hospital-Acquired Illness or Injury  Outcome: Ongoing, Progressing  Intervention: Identify and Manage Fall Risk  Recent Flowsheet Documentation  Taken 12/10/2022 0800 by Nicole Adhikari, RN  Safety Promotion/Fall Prevention: safety round/check completed  Intervention: Prevent Skin Injury  Recent Flowsheet Documentation  Taken 12/10/2022 0800 by Nicole Adhikari, RN  Body Position: supine  Intervention: Prevent and Manage VTE (Venous Thromboembolism) Risk  Recent Flowsheet Documentation  Taken 12/10/2022 0800 by Nicole Adhikari, RN  VTE Prevention/Management:   bilateral   sequential compression devices on  Intervention: Prevent Infection  Flowsheets (Taken 12/10/2022 1655)  Infection Prevention:   hand hygiene promoted   rest/sleep promoted   single patient room provided  Goal: Optimal Comfort and Wellbeing  Outcome: Ongoing, Progressing  Intervention: Provide Person-Centered Care  Recent Flowsheet Documentation  Taken 12/10/2022 0800 by Nicole Adhikari, RN  Trust Relationship/Rapport:   care explained   choices provided   emotional support provided  Goal: Readiness for Transition of Care  Outcome: Ongoing, Not Progressing  Intervention: Mutually Develop Transition Plan  Flowsheets (Taken 12/10/2022 1655)  Concerns Comments: Patient is not ready for transfer to antepartum due to fetus having episodic decels. Will remain in L&D on continuous monitoring.     Problem: Prelabor Rupture of Membranes  Goal: Delayed Delivery with Absence  of Infection  Outcome: Ongoing, Progressing  Intervention: Prevent or Manage Infection Related to Membrane Rupture  Flowsheets (Taken 12/10/2022 3464)  Perineal Care: (patient showered today, linen changed)   absorbent brief/pad changed   other (see comments)  Infection Prevention:   hand hygiene promoted   rest/sleep promoted   single patient room provided   Goal Outcome Evaluation:  Plan of Care Reviewed With: patient, spouse        Progress: no change

## 2022-12-10 NOTE — PLAN OF CARE
Goal Outcome Evaluation:  Plan of Care Reviewed With: patient, spouse           Outcome Evaluation: Patient has rested throughout the night and has no complaints of pain. VSS. Patient has had some leaking, but not continuous

## 2022-12-11 PROCEDURE — 99232 SBSQ HOSP IP/OBS MODERATE 35: CPT | Performed by: OBSTETRICS & GYNECOLOGY

## 2022-12-11 RX ADMIN — AMOXICILLIN 500 MG: 500 CAPSULE ORAL at 19:26

## 2022-12-11 RX ADMIN — Medication 10 ML: at 20:01

## 2022-12-11 RX ADMIN — AMOXICILLIN 500 MG: 500 CAPSULE ORAL at 01:05

## 2022-12-11 RX ADMIN — AMOXICILLIN 500 MG: 500 CAPSULE ORAL at 09:04

## 2022-12-11 RX ADMIN — Medication 10 ML: at 09:04

## 2022-12-11 NOTE — PLAN OF CARE
Goal Outcome Evaluation:  Plan of Care Reviewed With: patient, mother           Outcome Evaluation: Patient has rested throughout the night and has no complaints of pain. VSS. Patient has had some leaking, but not continuous

## 2022-12-11 NOTE — PROGRESS NOTES
[facility]     Progress Note    Patient Name: Cassandra Del Cid  : 1990  MRN: [id number]  Primary Care Physician:  Magaly Hatch APRN  Date of admission: 2022    Subjective   Subjective     Chief Complaint: PPROM day #5    History of Present Illness  Patient seen and examined.  Discussed with nursing staff.  Chart reviewed.  Events noted.    Patient without new complaints.  She continues to leak clear fluid.  She is having no vaginal bleeding or abdominal pain.  She reports active fetal movement.    She is status post betamethasone x2 doses  She continues on latency antibiotics, now on oral  Maternal-fetal medicine note reviewed from last week  Plan is to repeat BPP weekly    Patient Reports no new complaints    Review of Systems   Constitutional: Negative for fever.   Gastrointestinal: Negative for abdominal pain.   Genitourinary: Negative for pelvic pain and vaginal bleeding.        Leakage of fluid   All other systems reviewed and are negative.      Objective   Objective     Vitals:   Temp:  [97.6 °F (36.4 °C)-98 °F (36.7 °C)] 98 °F (36.7 °C)  Heart Rate:  [71-82] 71  Resp:  [16-18] 18  BP: ()/(53-62) 99/53    Physical Exam  Vitals and nursing note reviewed. Exam conducted with a chaperone present.   Constitutional:       Appearance: Normal appearance. She is normal weight.   HENT:      Head: Normocephalic.   Abdominal:      Comments: Fundal height appropriate with given gestational age.  No fundal tenderness.   Neurological:      Mental Status: She is alert.     Category 2 NST with periodic and occasionally prolonged decelerations noted  Variability is normal  Tocometer is quiet     Result Review    Result Review:  I have personally reviewed the results from the time of this admission to 2022 07:10 CST and agree with these findings:  [x]  Laboratory list / accordion  []  Microbiology  []  Radiology  []  EKG/Telemetry   []  Cardiology/Vascular   []  Pathology  []  Old records  []   Other:  Most notable findings include: Normal white blood cell count yesterday      Assessment & Plan   Assessment / Plan     Brief Patient Summary:  Cassandra Del Cid is a 32 y.o. female who admitted secondary to  premature rupture membranes.  She has completed her betamethasone course.  She is currently on latency antibiotics, day #5.  She remains without signs or symptoms of chorioamnionitis.  Fetal status remains reassuring.    Active Hospital Problems:  Active Hospital Problems    Diagnosis    • **Threatened premature labor    •  premature rupture of membranes (PPROM) with unknown onset of labor    • Breech presentation with  problem      Plan:   Continue latency antibiotics  Given availability of beds on labor and delivery as well as nonperiodic decelerations, will remain on labor and delivery under continuous monitoring  Repeat ultrasound this week with maternal-fetal medicine  Okay to ambulate in hallway    DVT prophylaxis:  Mechanical DVT prophylaxis orders are present.    CODE STATUS:    Level Of Support Discussed With: Patient  Code Status (Patient has no pulse and is not breathing): CPR (Attempt to Resuscitate)  Medical Interventions (Patient has pulse or is breathing): Full Support  Release to patient: Routine Release    Disposition:  I expect patient to be discharged following delivery.    Ethan Betancourt MD

## 2022-12-12 PROCEDURE — 99232 SBSQ HOSP IP/OBS MODERATE 35: CPT | Performed by: OBSTETRICS & GYNECOLOGY

## 2022-12-12 RX ADMIN — Medication 10 ML: at 08:56

## 2022-12-12 RX ADMIN — AMOXICILLIN 500 MG: 500 CAPSULE ORAL at 19:46

## 2022-12-12 RX ADMIN — AMOXICILLIN 500 MG: 500 CAPSULE ORAL at 11:25

## 2022-12-12 RX ADMIN — AMOXICILLIN 500 MG: 500 CAPSULE ORAL at 03:15

## 2022-12-12 RX ADMIN — Medication 10 ML: at 20:11

## 2022-12-12 NOTE — PLAN OF CARE
Goal Outcome Evaluation:  Plan of Care Reviewed With: patient, mother        Progress: no change  Outcome Evaluation: Patient being continuously monitored in LDR for PPROM.  Patient denies any contractions or pain this shift.  Patient continues to have some small leaks of clear fluid when up out of bed, but not continuous.  Patient off monitor tonight for 1 hour for shower and to eat dinner.  Patient receiving PO Amoxicillin [id number].  Patient IV is IID.  Patient to see MFM weekly.  Patient ambulating to bathroom independently, urine output adequate.  Bilateral SCD's on while in bed.  Mother at bedside.

## 2022-12-12 NOTE — NURSING NOTE
[id number] Patient off the monitor to walk around room, shower, and eat breakfast per Dr. Tena.    Pebbles Cuello RN

## 2022-12-12 NOTE — PLAN OF CARE
Problem: Adult Inpatient Plan of Care  Goal: Absence of Hospital-Acquired Illness or Injury  Intervention: Identify and Manage Fall Risk  Recent Flowsheet Documentation  Taken 12/11/2022 0715 by Nicole Adhikari RN  Safety Promotion/Fall Prevention: safety round/check completed  Intervention: Prevent Skin Injury  Recent Flowsheet Documentation  Taken 12/11/2022 0715 by Nicole Adhikari RN  Body Position: supine  Intervention: Prevent and Manage VTE (Venous Thromboembolism) Risk  Recent Flowsheet Documentation  Taken 12/11/2022 0715 by Nicole Adhikari RN  Activity Management: activity adjusted per tolerance  VTE Prevention/Management:   bilateral   sequential compression devices on  Intervention: Prevent Infection  Recent Flowsheet Documentation  Taken 12/11/2022 0715 by Nicole Adhikari RN  Infection Prevention:   hand hygiene promoted   single patient room provided   rest/sleep promoted   equipment surfaces disinfected  Goal: Optimal Comfort and Wellbeing  Intervention: Provide Person-Centered Care  Recent Flowsheet Documentation  Taken 12/11/2022 0715 by Nicole Adhikari RN  Trust Relationship/Rapport:   care explained   emotional support provided   empathic listening provided   questions encouraged   questions answered   reassurance provided   thoughts/feelings acknowledged     Problem: Prelabor Rupture of Membranes  Goal: Delayed Delivery with Absence of Infection  Outcome: Ongoing, Progressing  Intervention: Prevent or Manage Infection Related to Membrane Rupture  Flowsheets  Taken 12/11/2022 0715  Infection Prevention:   hand hygiene promoted   single patient room provided   rest/sleep promoted   equipment surfaces disinfected  Taken 12/10/2022 1655  Perineal Care: (patient showered today, linen changed)   absorbent brief/pad changed   other (see comments)   Goal Outcome Evaluation:  Plan of Care Reviewed With: patient, spouse        Progress: no change

## 2022-12-12 NOTE — PROGRESS NOTES
Tom Tena MD  [facility]  [address]  [place] [address]  Office [phone]  Fax [phone]      [facility]  Cassandra Del Cid  : 1990  MRN: [id number]  CSN: [id number]    Antepartum Progress note    Subjective   She reports is having no problems and is noting fluid leakage. Leakage clear but minimal. No contractions. No bleeding. No discharge. Endorses appropriate fetal movement.      Objective   Min/max vitals past 24 hours:  Temp  Min: 97.5 °F (36.4 °C)  Max: 98.5 °F (36.9 °C)   BP  Min: 99/65  Max: 107/68   Pulse  Min: 79  Max: 95   Resp  Min: 16  Max: 18        General: Well Developed, Well Nourished, not in acute distress   HEENT: normocephalic, atraumatic, conjunctiva non-icteric    Respiratory: non-labored, symmetrical chest rise   Cardiovascular: regular rate, no JVD  Gastrointestinal: gravid, soft, no TTP, no rebound tenderness or guarding to palpation, no palpable ctx   Neurologic: alert and oriented, CN II-XII grossly intact without focal deficit  Psychiatric: normal mood, pleasant, cooperative  Musculoskeletal: negative calf tenderness, no lower extremity edema  Skin: warm & dry, no cyanosis, no jaundice    Fetal Monitoring  FHT's: reactive, rare prolonged decelerations (last noted at 0544 this morning but returned to baseline and with moderate variability)  external monitors used   Contractions: none      Assessment   1. IUP at 29w2d  2.  Prelabor rupture of membranes  -s/p BMZ -  -latency ABX -  3. Breech presentation     Plan    -continue inpatient care secondary to PPROM  -c/w PO latency antibiotics, day #6  -Continuous electronic fetal monitoring as with intermittent decels  -weekly MFM visit and ultrasound (plan is for )    Tom Tena MD  2022  07:44 CST

## 2022-12-12 NOTE — PAYOR COMM NOTE
Ref:   [id number]  [facility]  SHAYLA,  [phone]  OR  FAX   [phone]     Maria Eugenia Cassandra R (32 y.o. Female)     Date of Birth   1990    Social Security Number       Address   [address] [place] [address]    Home Phone   [phone]    MRN   [id number]       [employer]   [facility]    Marital Status                               Admission Date   12/7/22    Admission Type   Elective    Admitting Provider   Olive Romero MD    Attending Provider   Olive Romero MD    Department, Room/Bed   [facility] LABOR DELIVERY, L03/1       Discharge Date       Discharge Disposition       Discharge Destination                               Attending Provider: Olive Romero MD    Allergies: Penicillins    Isolation: None   Infection: None   Code Status: CPR    Ht: 162.6 cm (64\")   Wt: --    Admission Cmt: None   Principal Problem: Threatened premature labor [O47.00]                 Active Insurance as of 12/7/2022     Primary Coverage     Payor Plan Insurance Group Employer/Plan Group    [employer] formerly [facility] PPO [id number]     Payor Plan Address Payor Plan Phone Number Payor Plan Fax Number Effective Dates    [address] [phone]  7/1/2022 - None Entered    [place] [id number]       Subscriber Name Subscriber Birth Date Member ID       EUGENIA DEL CID 8/6/1994 [id number]                 Emergency Contacts      (Rel.) Home Phone Work Phone Mobile Phone    Eugenia Del Cid (Spouse) [phone] -- --        Christie Rodriguez RN   Registered Nurse  Obstetrics  Plan of Care     Signed  Date of Service:  12/09/22 0526  Creation Time:  12/09/22 0526     Signed           Problem: Adult Inpatient Plan of Care  Goal: Plan of Care Review  Outcome: Ongoing, Progressing  Flowsheets (Taken 12/9/2022 0524)  Plan of Care Reviewed With: patient  Outcome Evaluation: Patient has rested throughout the night and has no complaints of pain. VSS. MD aware of  decels and some minimal variablity. Patients stated no leaking and no wet pad through the night.  Goal: Patient-Specific Goal (Individualized)  Outcome: Ongoing, Progressing  Goal: Absence of Hospital-Acquired Illness or Injury  Outcome: Ongoing, Progressing  Intervention: Identify and Manage Fall Risk  Recent Flowsheet Documentation  Taken 12/8/2022 1920 by Christie Rodriguez RN  Safety Promotion/Fall Prevention: assistive device/personal items within reach  Intervention: Prevent Skin Injury  Recent Flowsheet Documentation  Taken 12/8/2022 1920 by Christie Rodriguez RN  Body Position: sitting up in bed  Intervention: Prevent and Manage VTE (Venous Thromboembolism) Risk  Recent Flowsheet Documentation  Taken 12/8/2022 1920 by Christie Rodriguez RN  Activity Management: activity adjusted per tolerance  VTE Prevention/Management:  • bilateral  • sequential compression devices on  Goal: Optimal Comfort and Wellbeing  Outcome: Ongoing, Progressing  Intervention: Provide Person-Centered Care  Recent Flowsheet Documentation  Taken 12/8/2022 1920 by Christie Rodriguez RN  Trust Relationship/Rapport:  • care explained  • choices provided  • emotional support provided  • thoughts/feelings acknowledged  • reassurance provided  Goal: Readiness for Transition of Care  Outcome: Ongoing, Progressing     Problem: Prelabor Rupture of Membranes  Goal: Delayed Delivery with Absence of Infection  Outcome: Ongoing, Progressing   Goal Outcome Evaluation:  Plan of Care Reviewed With: patient  Outcome Evaluation: Patient has rested throughout the night and has no complaints of pain. VSS. MD aware of decels and some minimal variablity. Patients stated no leaking and no wet pad through the night.              Christie Rodriguez RN   Registered Nurse  Obstetrics  Plan of Care     Signed  Date of Service:  12/10/22 0205  Creation Time:  12/10/22 0205     Signed        Goal Outcome Evaluation:  Plan of Care Reviewed With: patient, spouse  Outcome Evaluation:  Patient has rested throughout the night and has no complaints of pain. VSS. Patient has had some leaking, but not continuous              Nicole Adhikari, RN   Registered Nurse  Plan of Care     Signed  Date of Service:  12/10/22 1702  Creation Time:  12/10/22 1702     Signed           Problem: Adult Inpatient Plan of Care  Goal: Plan of Care Review  Outcome: Ongoing, Progressing  Flowsheets (Taken 12/10/2022 1655)  Progress: no change  Plan of Care Reviewed With:  • patient  • spouse  Goal: Patient-Specific Goal (Individualized)  Outcome: Ongoing, Progressing  Flowsheets (Taken 12/10/2022 1655)  Individualized Care Needs: NICU consult completed. Temperature q2h  Anxieties, Fears or Concerns: Wants to remain pregnant as long as possible  Goal: Absence of Hospital-Acquired Illness or Injury  Outcome: Ongoing, Progressing  Intervention: Identify and Manage Fall Risk  Recent Flowsheet Documentation  Taken 12/10/2022 0800 by Nicole Adhikari, RN  Safety Promotion/Fall Prevention: safety round/check completed  Intervention: Prevent Skin Injury  Recent Flowsheet Documentation  Taken 12/10/2022 0800 by Nicole Adhikari, RN  Body Position: supine  Intervention: Prevent and Manage VTE (Venous Thromboembolism) Risk  Recent Flowsheet Documentation  Taken 12/10/2022 0800 by Nicole Adhikari, RN  VTE Prevention/Management:  • bilateral  • sequential compression devices on  Intervention: Prevent Infection  Flowsheets (Taken 12/10/2022 1655)  Infection Prevention:  • hand hygiene promoted  • rest/sleep promoted  • single patient room provided  Goal: Optimal Comfort and Wellbeing  Outcome: Ongoing, Progressing  Intervention: Provide Person-Centered Care  Recent Flowsheet Documentation  Taken 12/10/2022 0800 by Nicole Adhikari, RN  Trust Relationship/Rapport:  • care explained  • choices provided  • emotional support provided  Goal: Readiness for Transition of Care  Outcome: Ongoing, Not Progressing  Intervention:  Mutually Develop Transition Plan  Flowsheets (Taken 12/10/2022 1655)  Concerns Comments: Patient is not ready for transfer to antepartum due to fetus having episodic decels. Will remain in L&D on continuous monitoring.     Problem: Prelabor Rupture of Membranes  Goal: Delayed Delivery with Absence of Infection  Outcome: Ongoing, Progressing  Intervention: Prevent or Manage Infection Related to Membrane Rupture  Flowsheets (Taken 12/10/2022 1655)  Perineal Care: (patient showered today, linen changed)  • absorbent brief/pad changed  • other (see comments)  Infection Prevention:  • hand hygiene promoted  • rest/sleep promoted  • single patient room provided   Goal Outcome Evaluation:  Plan of Care Reviewed With: patient, spouse  Progress: no change                Christie Rodriguez, RN   Registered Nurse  Obstetrics  Plan of Care     Signed  Date of Service:  12/11/22 0113  Creation Time:  12/11/22 0113     Signed        Goal Outcome Evaluation:  Plan of Care Reviewed With: patient, mother  Outcome Evaluation: Patient has rested throughout the night and has no complaints of pain. VSS. Patient has had some leaking, but not continuous            Olivia Christian RN   Registered Nurse  Obstetrics  Plan of Care     Signed  Date of Service:  12/12/22 0452  Creation Time:  12/12/22 0452     Signed        Goal Outcome Evaluation:  Plan of Care Reviewed With: patient, mother  Progress: no change  Outcome Evaluation: Patient being continuously monitored in LDR for PPROM.  Patient denies any contractions or pain this shift.  Patient continues to have some small leaks of clear fluid when up out of bed, but not continuous.  Patient off monitor tonight for 1 hour for shower and to eat dinner.  Patient receiving PO Amoxicillin [id number].  Patient IV is IID.  Patient to see MFM weekly.  Patient ambulating to bathroom independently, urine output adequate.  Bilateral SCD's on while in bed.  Mother at bedside.                    Vital Signs  (last 3 days)     Date/Time Temp Temp src Pulse Resp BP Patient Position SpO2    12/12/22 0606 97.8 (36.6) Oral 83 16 102/60 Lying --    12/12/22 0317 97.6 (36.4) Axillary 79 18 99/65 Lying --    12/12/22 0031 97.5 (36.4) Oral 86 16 99/52 Lying --    12/11/22 2120 97.8 (36.6) Oral 88 17 105/65 Sitting --    12/11/22 1928 -- -- 95 16 107/68 Sitting --    12/11/22 1925 98.5 (36.9) Oral -- -- -- -- --    12/11/22 1444 97.6 (36.4) Oral -- -- -- -- --    12/11/22 1153 97.6 (36.4) Oral -- -- -- -- --    12/11/22 0900 97.9 (36.6) Oral -- -- -- -- --    12/11/22 0714 98 (36.7) Oral 84 19 102/57 Lying 99    12/11/22 0500 98 (36.7) Oral -- -- -- -- --    12/11/22 0333 -- -- 71 18 99/53 Lying --    12/11/22 0308 97.8 (36.6) Tympanic 72 16 106/60 Lying --    12/11/22 0000 97.6 (36.4) Oral -- -- -- -- --    12/10/22 2205 97.7 (36.5) Oral 76 16 102/62 Lying --    12/10/22 1931 98 (36.7) Oral 82 16 108/62 Lying --    12/10/22 1610 97.9 (36.6) Oral -- -- -- -- --    12/10/22 0845 97.9 (36.6) Oral -- -- -- -- --    12/10/22 0537 98 (36.7) Temporal 76 16 101/55 Lying --    12/10/22 0300 97.2 (36.2) Temporal -- -- -- -- --    12/09/22 2237 97.8 (36.6) Oral 84 18 86/45 Lying --    12/09/22 1906 97.9 (36.6) Oral 83 16 102/53 Lying --    12/09/22 1734 98.3 (36.8) Oral 82 18 102/58 Lying --    12/09/22 1513 98.3 (36.8) Oral 102 18 109/52 Lying --    12/09/22 1205 98.2 (36.8) Temporal 77 18 90/52 Lying --    12/09/22 0930 98.8 (37.1) Oral -- -- -- -- --    12/09/22 0709 98.2 (36.8) Oral 81 18 87/47 Lying --    12/09/22 0433 97.6 (36.4) Oral 76 16 91/56 Lying --          Intake & Output (last 3 days)       12/09 0701  12/10 0700 12/10 0701  12/11 0700 12/11 0701  12/12 0700 12/12 0701  12/13 0700            Urine Unmeasured Occurrence 2 x  3 x          Facility-Administered Medications as of 12/12/2022   Medication Dose Route Frequency Provider Last Rate Last Admin   • [COMPLETED] ampicillin 2 gm IVPB in 100 ml NS (VTB)  2 g Intravenous Q6H  Olive Romero  mL/hr at 12/09/22 0429 2 g at 12/09/22 0429    And   • amoxicillin (AMOXIL) capsule 500 mg  500 mg Oral Q8H Olive Romero MD   500 mg at 12/12/22 1125    And   • [COMPLETED] azithromycin (ZITHROMAX) tablet 1,000 mg  1,000 mg Oral Once Olive Romero MD   1,000 mg at 12/07/22 0922   • [COMPLETED] betamethasone acetate-betamethasone sodium phosphate (CELESTONE SOLUSPAN) injection 12 mg  12 mg Intramuscular Q24H Olive Romero MD   12 mg at 12/08/22 0631   • lactated ringers infusion  50 mL/hr Intravenous Q6H PRN Francisco Olson MD 50 mL/hr at 12/08/22 1521 50 mL/hr at 12/08/22 1521   • lidocaine PF 1% (XYLOCAINE) injection 5 mL  5 mL Intradermal PRN Olive Romero MD       • ondansetron (ZOFRAN) tablet 4 mg  4 mg Oral Q8H PRN Olive Romero MD        Or   • ondansetron (ZOFRAN) injection 4 mg  4 mg Intravenous Q8H PRN Olive Romero MD       • sodium chloride 0.9 % flush 10 mL  10 mL Intravenous Q12H Olive Romero MD   10 mL at 12/12/22 0856   • sodium chloride 0.9 % flush 10 mL  10 mL Intravenous PRN Olive Romero MD         Orders (last 7 days)      Start     Ordered    12/13/22 0500  CBC & Differential  Every Third Day       12/11/22 0700    12/13/22 0500  Type & Screen  Every 72 Hours       12/11/22 0700    12/10/22 1010  Prepare RBC, 2 Units  Blood - Once         12/10/22 1009    12/10/22 1009  Antibody Identification  Once         12/10/22 1008    12/10/22 0845  Patient may be off monitor for 30 min intervals a few times per shift for showering, eating, being up in room.  Nursing Communication  Once        Comments: Patient may be off monitor for 30 min intervals a few times per shift for showering, eating, being up in room.    12/10/22 0845    12/10/22 0600  CBC & Differential  Morning Draw         12/09/22 0842    12/10/22 0600  Type & Screen  Morning Draw         12/09/22 0842    12/10/22 0600  CBC Auto Differential  PROCEDURE ONCE         12/09/22 2202    12/09/22 0545   amoxicillin (AMOXIL) capsule 500 mg  Every 8 Hours        See Hyperspace for full Linked Orders Report.    12/07/22 0550    12/08/22 1530  lactated ringers infusion  Every 6 Hours PRN         12/08/22 1352    12/08/22 0800  lactated ringers infusion  Continuous,   Status:  Discontinued         12/08/22 0947    12/08/22 0740  Saline lock IV, KVO only during antibiotic infusion  Misc Nursing Order (Specify)  Once        Comments: Saline lock IV, KVO only during antibiotic infusion    12/08/22 0947    12/07/22 0933  Inpatient Consult to Neonatology  Once        Specialty:  Neonatology  Provider:  (Not yet assigned)    12/07/22 0932    12/07/22 0933  Code Status and Medical Interventions:  Continuous         12/07/22 0932    12/07/22 0932  Inpatient Maternal & Fetal Medicine Consult  Once        Specialty:  Maternal and Fetal Medicine  Provider:  (Not yet assigned)    12/07/22 0932    12/07/22 0910  US Ob Limited 1 + Fetuses  1 Time Imaging         12/07/22 0820    12/07/22 0900  sodium chloride 0.9 % flush 10 mL  Every 12 Hours Scheduled         12/07/22 0550    12/07/22 0856  Antibody Identification  Once         12/07/22 0855    12/07/22 0855  Prepare RBC, 2 Units  Blood - Once         12/07/22 0854    12/07/22 0821  Rapid Assay For ROM - Amniotic Fluid, Amniotic Sac  STAT         12/07/22 0820    12/07/22 0819  US Ob 14 + Weeks Single or First Gestation  1 Time Imaging,   Status:  Canceled         12/07/22 0820    12/07/22 0805  Diet: Regular/House Diet; Texture: Regular Texture (IDDSI 7); Fluid Consistency: Thin (IDDSI 0)  Diet Effective Now         12/07/22 0816    12/07/22 0741  Inpatient Consult to Neonatology  Once        Specialty:  Neonatology  Provider:  (Not yet assigned)    12/07/22 0741    12/07/22 0645  lactated ringers infusion  Continuous,   Status:  Discontinued         12/07/22 0550    12/07/22 0645  ampicillin 2 gm IVPB in 100 ml NS (VTB)  Every 6 Hours        See Hyperspace for full Linked Orders  Report.    22 0550    22 0645  azithromycin (ZITHROMAX) tablet 1,000 mg  Once        See Hyperspace for full Linked Orders Report.    22 0550    2245  betamethasone acetate-betamethasone sodium phosphate (CELESTONE SOLUSPAN) injection 12 mg  Every 24 Hours         22 0550    22 0546  ondansetron (ZOFRAN) tablet 4 mg  Every 8 Hours PRN        See Hyperspace for full Linked Orders Report.    22 0550    22 0546  ondansetron (ZOFRAN) injection 4 mg  Every 8 Hours PRN        See Hyperspace for full Linked Orders Report.    22 0550    22 0544  NPO Diet NPO Type: Strict NPO  Diet Effective Now,   Status:  Canceled         22 0550    22 0543  External Uterine Contraction Monitoring  Per Hospital Policy         22 0550    22 0543  Continuous Fetal Monitoring With NST on Admission and Prior to Initiation of Oxytocin.  Per Order Details        Comments: Continuous Fetal Monitoring With NST on Admission & Prior to Initiation of Oxytocin.    22 0550    22 0542  Admit To Obstetrics Inpatient  Once         22 0550    22 0542  Vital Signs Per Hospital Policy  Per Hospital Policy         22 0550    22 0542  Notify Provider (Specified)  Until Discontinued         22 0550    22 0542  Notify Provider of Tachysystole (Per Hospital Algorithm)  Until Discontinued         22 0550    22 0542  Notify Provider if Membranes Ruptured, Bleeding Greater Than 1 Pad Per Hour, Fetal Heart Tone Abnormality or Severe Pain  Until Discontinued         22 0550    22 0542  Initiate Group Beta Strep (GBS) Prophylaxis Protocol, If Criteria Met  Continuous        Comments: NO TREATMENT RECOMMENDED IF: 1) Maternal GBS Status Known Negative 2) Scheduled  Birth With Intact Membranes, Not in Labor 3) Maternal GBS Status Unknown, No Risk Factors  TREAT WITH ANTIBIOTICS IF:  1) Maternal GBS Status  Known Positive 2) Maternal GBS Status Unknown With Risk Factors: a)  Previous Infant Affected By GBS Infection b) GBS Urinary Tract Infection (UTI) or Bacteriuria During Pregnancy c) Unexplained Maternal Fever (100.4F (38C) or Greater) During Labor d)  Prolonged Rupture of Membranes (18 or More Hours) e) Gestational Age Less Than 37 Weeks    22 0550    22 0542  CBC (No Diff)  Once         22 0550    22 0542  Type & Screen  Once         22 0550    22 0542  Insert Peripheral IV  Once         22 0550    22 0542  Saline Lock & Maintain IV Access  Continuous         22 0550    22 0542  Place Sequential Compression Device  Once         22 0550    22 0542  Maintain Sequential Compression Device  Continuous         22 0550    22 0541  lidocaine PF 1% (XYLOCAINE) injection 5 mL  As Needed         22 0550    22 0541  sodium chloride 0.9 % flush 10 mL  As Needed         22 0550    Unscheduled  Position Change - For Intra-Uterine Resusitation for Hypertonus, HyperStimulation or Non-Reassuring Fetal Status  As Needed       22 0550    --  prenatal vitamin (prenatal, CLASSIC, vitamin) tablet  Daily         22 0532                   Physician Progress Notes (last 7 days)      Tom Tena MD at 22 0744              Tom Tena MD  [facility]  [address] [place] [address] [address] [place] [address]  Office [phone]  Fax [phone]      [facility]  Cassandra Del Cid  : 1990  MRN: [id number]  CSN: [id number]    Antepartum Progress note    Subjective   She reports is having no problems and is noting fluid leakage. Leakage clear but minimal. No contractions. No bleeding. No discharge. Endorses appropriate fetal movement.     Objective   Min/max vitals past 24 hours:  Temp  Min: 97.5 °F (36.4 °C)  Max: 98.5 °F (36.9 °C)   BP  Min: 99/65  Max: 107/68   Pulse  Min: 79  Max: 95   Resp  Min: 16  Max: 18         General: Well Developed, Well Nourished, not in acute distress   HEENT: normocephalic, atraumatic, conjunctiva non-icteric    Respiratory: non-labored, symmetrical chest rise   Cardiovascular: regular rate, no JVD  Gastrointestinal: gravid, soft, no TTP, no rebound tenderness or guarding to palpation, no palpable ctx   Neurologic: alert and oriented, CN II-XII grossly intact without focal deficit  Psychiatric: normal mood, pleasant, cooperative  Musculoskeletal: negative calf tenderness, no lower extremity edema  Skin: warm & dry, no cyanosis, no jaundice    Fetal Monitoring  FHT's: reactive, rare prolonged decelerations (last noted at 0544 this morning but returned to baseline and with moderate variability)  external monitors used   Contractions: none     Assessment   1. IUP at 29w2d  2.  Prelabor rupture of membranes  -s/p BMZ -  -latency ABX -  3. Breech presentation    Plan    -continue inpatient care secondary to PPROM  -c/w PO latency antibiotics, day #6  -Continuous electronic fetal monitoring as with intermittent decels  -weekly MFM visit and ultrasound (plan is for )    Tom Tena MD  2022  07:44 CST          Electronically signed by Tom Tena MD at 22 0750     Ethan Betancourt MD at 22 0710           [facility]     Progress Note    Patient Name: Cassandra Del Cid  : 1990  MRN: [id number]  Primary Care Physician:  Magaly Hatch APRN  Date of admission: 2022    Subjective   Subjective     Chief Complaint: PPROM day #5    History of Present Illness  Patient seen and examined.  Discussed with nursing staff.  Chart reviewed.  Events noted.    Patient without new complaints.  She continues to leak clear fluid.  She is having no vaginal bleeding or abdominal pain.  She reports active fetal movement.    She is status post betamethasone x2 doses  She continues on latency antibiotics, now on oral  Maternal-fetal medicine note  reviewed from last week  Plan is to repeat BPP weekly    Patient Reports no new complaints    Review of Systems   Constitutional: Negative for fever.   Gastrointestinal: Negative for abdominal pain.   Genitourinary: Negative for pelvic pain and vaginal bleeding.        Leakage of fluid   All other systems reviewed and are negative.      Objective   Objective     Vitals:   Temp:  [97.6 °F (36.4 °C)-98 °F (36.7 °C)] 98 °F (36.7 °C)  Heart Rate:  [71-82] 71  Resp:  [16-18] 18  BP: ()/(53-62) 99/53    Physical Exam  Vitals and nursing note reviewed. Exam conducted with a chaperone present.   Constitutional:       Appearance: Normal appearance. She is normal weight.   HENT:      Head: Normocephalic.   Abdominal:      Comments: Fundal height appropriate with given gestational age.  No fundal tenderness.   Neurological:      Mental Status: She is alert.     Category 2 NST with periodic and occasionally prolonged decelerations noted  Variability is normal  Tocometer is quiet     Result Review    Result Review:  I have personally reviewed the results from the time of this admission to 2022 07:10 CST and agree with these findings:  [x]  Laboratory list / accordion  []  Microbiology  []  Radiology  []  EKG/Telemetry   []  Cardiology/Vascular   []  Pathology  []  Old records  []  Other:  Most notable findings include: Normal white blood cell count yesterday      Assessment & Plan   Assessment / Plan     Brief Patient Summary:  Cassandra Del Cid is a 32 y.o. female who admitted secondary to  premature rupture membranes.  She has completed her betamethasone course.  She is currently on latency antibiotics, day #5.  She remains without signs or symptoms of chorioamnionitis.  Fetal status remains reassuring.    Active Hospital Problems:  Active Hospital Problems    Diagnosis    • **Threatened premature labor    •  premature rupture of membranes (PPROM) with unknown onset of labor    • Breech presentation  with  problem      Plan:   Continue latency antibiotics  Given availability of beds on labor and delivery as well as nonperiodic decelerations, will remain on labor and delivery under continuous monitoring  Repeat ultrasound this week with maternal-fetal medicine  Okay to ambulate in hallway    DVT prophylaxis:  Mechanical DVT prophylaxis orders are present.    CODE STATUS:    Level Of Support Discussed With: Patient  Code Status (Patient has no pulse and is not breathing): CPR (Attempt to Resuscitate)  Medical Interventions (Patient has pulse or is breathing): Full Support  Release to patient: Routine Release    Disposition:  I expect patient to be discharged following delivery.    Ethan Betancourt MD             Electronically signed by Ethan Betancourt MD at 22 0714     Ethan Betancourt MD at 12/10/22 0819           [facility]     Progress Note    Patient Name: Cassandra Del Cid  : 1990  MRN: [id number]  Primary Care Physician:  Magaly Hatch APRN  Date of admission: 2022    Subjective   Subjective     Chief Complaint: PPROM    History of Present Illness  Patient seen and examined.  Chart reviewed.  Events noted.  Discussed with nursing staff.    Patient without new complaints.  She continues to report active fetal movement.  She continues to leak small amounts of fluid.  She is having no vaginal bleeding or contractions.    Patient Reports no new symptoms    Review of Systems   Genitourinary:        Leakage of fluid   All other systems reviewed and are negative.      Objective   Objective     Vitals:   Temp:  [97.2 °F (36.2 °C)-98.8 °F (37.1 °C)] 98 °F (36.7 °C)  Heart Rate:  [] 76  Resp:  [16-18] 16  BP: ()/(45-58) 101/55    Physical Exam  Vitals and nursing note reviewed. Exam conducted with a chaperone present.   Constitutional:       Appearance: Normal appearance. She is normal weight.   HENT:      Head: Normocephalic.   Abdominal:      Comments: Fundus  nontender.  Fundus consistent with stated gestational age.   Musculoskeletal:         General: Normal range of motion.   Skin:     General: Skin is warm.   Neurological:      General: No focal deficit present.      Mental Status: She is oriented to person, place, and time. Mental status is at baseline.   Psychiatric:         Mood and Affect: Mood normal.         Behavior: Behavior normal.         Thought Content: Thought content normal.         Judgment: Judgment normal.     Category 2 NST.  Occasional periodic decelerations with normal variability  Tocometer is quiet     Result Review    Result Review:  I have personally reviewed the results from the time of this admission to 12/10/2022 08:22 CST and agree with these findings:  []  Laboratory list / accordion  []  Microbiology  []  Radiology  []  EKG/Telemetry   []  Cardiology/Vascular   []  Pathology  []  Old records  []  Other:  Most notable findings include: Labs are pending this morning      Assessment & Plan   Assessment / Plan     Brief Patient Summary:  Cassandra Del Cid is a 32 y.o. female who admitted with  premature rupture membranes.  Status post betamethasone x2.  Continues on antibiotics.    Active Hospital Problems:  Active Hospital Problems    Diagnosis    • **Threatened premature labor    •  premature rupture of membranes (PPROM) with unknown onset of labor    • Breech presentation with  problem      Plan:   Expectant management  Continuous monitoring for now    DVT prophylaxis:  Mechanical DVT prophylaxis orders are present.    CODE STATUS:    Level Of Support Discussed With: Patient  Code Status (Patient has no pulse and is not breathing): CPR (Attempt to Resuscitate)  Medical Interventions (Patient has pulse or is breathing): Full Support  Release to patient: Routine Release    Disposition:  I expect patient to be discharged following delivery.    Ethan Betancourt MD             Electronically signed by Ethan Betancourt MD  at 12/10/22 0822     Ethan Betancourt MD at 22 0651           [facility]     Progress Note    Patient Name: Cassandra Del Cid  : 1990  MRN: [id number]  Primary Care Physician:  Magaly Hatch APRN  Date of admission: 2022    Subjective   Subjective     Chief Complaint: , premature rupture of membranes    History of Present Illness  Patient seen and examined.  Events noted.  Chart reviewed.    Patient was admitted as a transfer from [facility] in [place], [place].  She was admitted 2 days ago secondary to the diagnosis of  premature rupture membranes  She has completed a course of steroids  She never required magnesium sulfate  She has started her IV antibiotics and is scheduled to start oral antibiotics today  She reports continued leakage of fluid although it is less  She denies any vaginal bleeding or contractions or pain  She reports active fetal movement    Patient was discussed with nursing staff  I was informed that she had a couple of variable decelerations as well as prolonged decelerations overnight  Otherwise, the report was normal  Rupture of Membranes  This is a new problem. The current episode started in the past 7 days. The problem occurs constantly. The problem has been gradually improving. Pertinent negatives include no abdominal pain or fever. Nothing aggravates the symptoms.     Patient Reports no new complaints    Review of Systems   Constitutional: Negative for fever.   Gastrointestinal: Negative for abdominal pain.   Genitourinary:        Leakage of fluid   All other systems reviewed and are negative.      Objective   Objective     Vitals:   Temp:  [97.6 °F (36.4 °C)-98.1 °F (36.7 °C)] 97.6 °F (36.4 °C)  Heart Rate:  [73-96] 76  Resp:  [16-18] 16  BP: ()/(49-68) 91/56    Physical Exam  Vitals and nursing note reviewed. Exam conducted with a chaperone present.   Constitutional:       Appearance: Normal appearance. She is normal weight.   HENT:       Head: Normocephalic.   Cardiovascular:      Rate and Rhythm: Normal rate and regular rhythm.      Pulses: Normal pulses.   Pulmonary:      Effort: Pulmonary effort is normal.   Abdominal:      Comments: Soft.  Gravid fundus consistent with gestational age.  No fundal tenderness.     Category 2 tracing noted.  Episodic decelerations as well as 1 or 2 prolonged decelerations noted overnight  Tocometer is quiet     Result Review    Result Review:  I have personally reviewed the results from the time of this admission to 2022 06:51 CST and agree with these findings:  [x]  Laboratory list / accordion  []  Microbiology  []  Radiology  []  EKG/Telemetry   []  Cardiology/Vascular   []  Pathology  []  Old records  []  Other:  Most notable findings include: Normal white blood cell count on admission      Assessment & Plan   Assessment / Plan     Brief Patient Summary:  Cassandra Del Cid is a 32 y.o. female who admitted with  premature rupture membranes at 28 weeks.  Status post betamethasone.    Active Hospital Problems:  Active Hospital Problems    Diagnosis    • **Threatened premature labor      Plan:   Continue labor and delivery management for now given category 2 tracing  Continue antibiotics  Encourage compliance with SCDs while in bed  If delivery prior to 32 weeks, will need magnesium sulfate neuro protection  If remains breech at delivery is indicated, will require  section  The above plan was outlined to the patient and she verbalizes understanding    DVT prophylaxis:  Mechanical DVT prophylaxis orders are present.    CODE STATUS:    Level Of Support Discussed With: Patient  Code Status (Patient has no pulse and is not breathing): CPR (Attempt to Resuscitate)  Medical Interventions (Patient has pulse or is breathing): Full Support  Release to patient: Routine Release    Disposition:  I expect patient to be discharged following delivery.    Ethan Betancourt MD             Electronically signed by  Ethan Betancourt MD at 22 0655                                                 Consult Notes (last 7 days)      Chato Parsons MD at 22 1232      Consult Orders    1. Inpatient Consult to Neonatology [[id number]] ordered by Olive Romero MD at 22 0932                PRENATAL CONSULTATION NOTE     DATE: 2022  MRN: [id number]      Patient Name: Cassandra Del Cid  YOB: 1990        Requesting Physician:  Dr. Romero    EDC: 2023, by Patient Reported    GA: 28w4d    Estimated fetal weight: unknown.    Reason for Consultation: potential premature single at 28 4/7 weeks gestation    Maternal History: 32 y.o.  with hematoma x2, but resolved, now with premature rupture of membranes      Medications:  No recently discontinued medications to reconcile    Consult:  paternal grandfather, paternal grandmother, father and mother available for discussion.  We reviewed the fetal and  aspects of the above conditions to include: estimated survival rate, estimated intact survival rate, respiratory distress syndrome, beneficial effects of steroids, early onset of sepsis, intraventricular hemorrhage, cerebral palsy, retinopathy of prematurity, chronic lung disease, necrotizing enterocolitis, late onset sepsis, apnea of prematurity, anemia of prematurity, feeding difficulty, temperature instability, jaundice and hypoglycemia    Plan for Resuscitation: full resuscitation        Offered estimation of prognosis of potential length of infant hospitalization.     We will plan to attend delivery when requested. I discussed the importance of providing human milk for all infants, especially premature infants. The patient does plan on providing pumped breast milk and/or nursing when appropriate.    I also reviewed policies and procedures for NICU/ICN admission and reviewed structure and function of [facility] - Neonatology at [facility].    Thank you for allowing us to participate in the  care of this patient. [facility] is always available for follow-up consultation as indicated. Please do not hesitate to call for additional questions or issues.    Additional Comments: none    Chato Parsons MD  Attending Neonatologist  [facility] - Neonatology  [facility]  22 @ 12:35 CST    Consult note reviewed and electronically signed on 2022 at 12:35 CST    INITIAL HOSPITAL CONSULT:  A total of 40 minutes were spent on counseling and coordination of care including discussion with physicians and nursing, and reviewing the findings and recommendations with the patient and her family of which 30 minutes were spent face-to-face with the patient during this encounter.    Thank you for requesting this consult. Please contact the [facility] on-call  Provider for any further questions/concerns, by calling the NICU at extension [phone].        DISCLAIMER:       “As of 2021, as required by the Federal 21st Century Cures Act, medical records (including provider notes and laboratory/imaging results) are to be made available to patients and/or their designees as soon as the documents are signed/resulted. While the intention is to ensure transparency and to engage patients in their healthcare, this immediate access may create unintended consequences because this document uses language intended for communication between medical providers for interpretation with the entirety of the patient’s clinical picture in mind. It is recommended that patients and/or their designees review all available information with their primary or specialist providers for explanation and to avoid misinterpretation of this information.”          Electronically signed by Chato Parsons MD at 22 1248     Luis Alberto Ryan MD at 22 1409      Consult Orders    1. Inpatient Maternal & Fetal Medicine Consult [[id number]] ordered by Olive Romero MD at 22 0932               [facility]  consult    Patient Active Problem List    Diagnosis Date Noted   •  premature rupture of membranes (PPROM) with unknown onset of labor 2022   • Vaginal bleeding during pregnancy, antepartum 2022   • [place] isoimmunization during pregnancy 08/10/2022      premature rupture of membranes (PPROM) with unknown onset of labor  31yo   Currently 27 2/7 weeks    Multiple bleeding episodes this pregnancy.   Two in the first trimester followed by some bleeding ~14 weeks.   Was stable until a fairly significant episode ~22 weeks. Evaluated at [facility] at the time.     No bleeding for ~4 weeks but had PROM earlier today  Currently without signs of labor or infection  VSS  On latency antibiotics and receiving a BMTZ course  Has been seen by Dr Parsons from neonatology as well.     An US was performed today.   Please see AS for the full report.   Breech presentation.   Overall size is consistent with dates. Interval growth   appropriate. EFW 1125g   Anatomy reviewed; no structural anomalies visualized.   Normal fluid pocket at this time.   BPP         Findings discussed with the couple.     We discussed the issues created with PROM at this gestational age.   Primary concern is for infection  If there is clinical concern for infection, delivery would be required regardless of gestational age.   We discussed the reasons behind the use of latency antibiotics and the betamethasone.     Discussed the case with Dr Parsons as well. He, based upon the current clinical picture, mom's gestational and the current EFW that keeping Cassandra here at this facility is reasonable with the capabilities he has in this nursery.     Recommend:  Continuing the latency antibiotics. 48hrs IV, 5 days of oral then d/c  Completing the BMTZ course, second dose at 48 hours.   Inpatient management until delivery secondary to the potential for maternal and/or fetal status to change rapidly  Daily assessment of maternal and fetal  status.   If clinical concern for infection or labor-->deliver  If happens to reach 34.0 weeks, recommend delivery at that time for risk of continued in utero management then outweighs potential benefits to the baby.   Will do weekly BPPs  Will check-in with the couple when I am on campus.     All questions answered. Time on floor/unit for chart review, patient evaluation, discussion, charting and coordination of care was >40minutes with >50% in face to face counseling and coordination of care.       [place] isoimmunization during pregnancy  Last check of her titer 4 weeks ago. Remains <2      As always, if there are any questions/concerns, please do not hesitate to contact me.   Thanks  Ana  [phone]        Use of magnesium for \"neuroprotection\" is indicated if delivery is 32.0 weeks or less. The benefit from this therapy is to have an elevated magnesium level at the time of birth. With her not being in labor at this time, magnesium infusion not needed. Would restart if delivery is indicated prior to 32.0 weeks.           Electronically signed by Luis Alberto Ryan MD at 12/07/22 7208

## 2022-12-13 LAB
ABO GROUP BLD: NORMAL
BASOPHILS # BLD AUTO: 0.05 10*3/MM3 (ref 0–0.2)
BASOPHILS NFR BLD AUTO: 0.5 % (ref 0–1.5)
BH BB BLOOD EXPIRATION DATE: NORMAL
BH BB BLOOD EXPIRATION DATE: NORMAL
BH BB BLOOD TYPE BARCODE: 9500
BH BB BLOOD TYPE BARCODE: 9500
BH BB DISPENSE STATUS: NORMAL
BH BB DISPENSE STATUS: NORMAL
BH BB PRODUCT CODE: NORMAL
BH BB PRODUCT CODE: NORMAL
BH BB UNIT NUMBER: NORMAL
BH BB UNIT NUMBER: NORMAL
BLD GP AB SCN SERPL QL: POSITIVE
CROSSMATCH INTERPRETATION: NORMAL
CROSSMATCH INTERPRETATION: NORMAL
DEPRECATED RDW RBC AUTO: 49.2 FL (ref 37–54)
EOSINOPHIL # BLD AUTO: 0.11 10*3/MM3 (ref 0–0.4)
EOSINOPHIL NFR BLD AUTO: 1.1 % (ref 0.3–6.2)
ERYTHROCYTE [DISTWIDTH] IN BLOOD BY AUTOMATED COUNT: 13.5 % (ref 12.3–15.4)
HCT VFR BLD AUTO: 35.9 % (ref 34–46.6)
HGB BLD-MCNC: 11.4 G/DL (ref 12–15.9)
IMM GRANULOCYTES # BLD AUTO: 0.22 10*3/MM3 (ref 0–0.05)
IMM GRANULOCYTES NFR BLD AUTO: 2.2 % (ref 0–0.5)
LYMPHOCYTES # BLD AUTO: 1.49 10*3/MM3 (ref 0.7–3.1)
LYMPHOCYTES NFR BLD AUTO: 14.8 % (ref 19.6–45.3)
MCH RBC QN AUTO: 31.3 PG (ref 26.6–33)
MCHC RBC AUTO-ENTMCNC: 31.8 G/DL (ref 31.5–35.7)
MCV RBC AUTO: 98.6 FL (ref 79–97)
MONOCYTES # BLD AUTO: 0.5 10*3/MM3 (ref 0.1–0.9)
MONOCYTES NFR BLD AUTO: 5 % (ref 5–12)
NEUTROPHILS NFR BLD AUTO: 7.73 10*3/MM3 (ref 1.7–7)
NEUTROPHILS NFR BLD AUTO: 76.4 % (ref 42.7–76)
NRBC BLD AUTO-RTO: 0 /100 WBC (ref 0–0.2)
PLATELET # BLD AUTO: 211 10*3/MM3 (ref 140–450)
PMV BLD AUTO: 9.5 FL (ref 6–12)
RBC # BLD AUTO: 3.64 10*6/MM3 (ref 3.77–5.28)
RESIDUAL RHIG DETECTED: NORMAL
RH BLD: NEGATIVE
T&S EXPIRATION DATE: NORMAL
UNIT  ABO: NORMAL
UNIT  ABO: NORMAL
UNIT  RH: NORMAL
UNIT  RH: NORMAL
WBC NRBC COR # BLD: 10.1 10*3/MM3 (ref 3.4–10.8)

## 2022-12-13 PROCEDURE — 86870 RBC ANTIBODY IDENTIFICATION: CPT | Performed by: OBSTETRICS & GYNECOLOGY

## 2022-12-13 PROCEDURE — 86901 BLOOD TYPING SEROLOGIC RH(D): CPT | Performed by: OBSTETRICS & GYNECOLOGY

## 2022-12-13 PROCEDURE — 86922 COMPATIBILITY TEST ANTIGLOB: CPT

## 2022-12-13 PROCEDURE — 86920 COMPATIBILITY TEST SPIN: CPT

## 2022-12-13 PROCEDURE — 86850 RBC ANTIBODY SCREEN: CPT | Performed by: OBSTETRICS & GYNECOLOGY

## 2022-12-13 PROCEDURE — 85025 COMPLETE CBC W/AUTO DIFF WBC: CPT | Performed by: OBSTETRICS & GYNECOLOGY

## 2022-12-13 PROCEDURE — 86900 BLOOD TYPING SEROLOGIC ABO: CPT | Performed by: OBSTETRICS & GYNECOLOGY

## 2022-12-13 RX ADMIN — AMOXICILLIN 500 MG: 500 CAPSULE ORAL at 04:14

## 2022-12-13 RX ADMIN — AMOXICILLIN 500 MG: 500 CAPSULE ORAL at 11:30

## 2022-12-13 RX ADMIN — AMOXICILLIN 500 MG: 500 CAPSULE ORAL at 21:14

## 2022-12-13 RX ADMIN — Medication 10 ML: at 21:14

## 2022-12-14 PROCEDURE — 99231 SBSQ HOSP IP/OBS SF/LOW 25: CPT | Performed by: OBSTETRICS & GYNECOLOGY

## 2022-12-14 RX ADMIN — AMOXICILLIN 500 MG: 500 CAPSULE ORAL at 05:08

## 2022-12-14 RX ADMIN — Medication 10 ML: at 20:38

## 2022-12-14 RX ADMIN — Medication 10 ML: at 10:55

## 2022-12-14 NOTE — PAYOR COMM NOTE
REF:  [id number]    [facility]  SHAYLA,  [phone]  OR  FAX  [phone]      Cassandra Del Cid (32 y.o. Female)     Date of Birth   1990    Social Security Number       Address   [address] [place] [address]    Home Phone   [phone]    MRN   [id number]       Jainism   [facility] [place]    Marital Status                               Admission Date   12/7/22    Admission Type   Elective    Admitting Provider   Olive Romero MD    Attending Provider   Olive Romero MD    Department, Room/Bed   [facility] LABOR DELIVERY, L03/1       Discharge Date       Discharge Disposition       Discharge Destination                               Attending Provider: Olive Romero MD    Allergies: Penicillins    Isolation: None   Infection: None   Code Status: CPR    Ht: 162.6 cm (64\")   Wt: --    Admission Cmt: None   Principal Problem: Threatened premature labor [O47.00]                 Active Insurance as of 12/7/2022     Primary Coverage     Payor Plan Insurance Group Employer/Plan Group    ANTHEM BLUE CROSS [facility] [employer] [facility] [id number]     Payor Plan Address Payor Plan Phone Number Payor Plan Fax Number Effective Dates    [address] [phone]  7/1/2022 - None Entered    [place] [address]       Subscriber Name Subscriber Birth Date Member ID       DEL CID,EUGENIA 8/6/1994 [id number]                 Emergency Contacts      (Rel.) Home Phone Work Phone Mobile Phone    Del CidBisih (Spouse) [phone] -- --              Current Facility-Administered Medications   Medication Dose Route Frequency Provider Last Rate Last Admin   • lactated ringers infusion  50 mL/hr Intravenous Q6H PRN Francisco Olson MD 50 mL/hr at 12/08/22 1521 50 mL/hr at 12/08/22 1521   • lidocaine PF 1% (XYLOCAINE) injection 5 mL  5 mL Intradermal PRN Olive Romero MD       • ondansetron (ZOFRAN) tablet 4 mg  4 mg Oral Q8H PRN Olive Romero MD        Or   •  ondansetron (ZOFRAN) injection 4 mg  4 mg Intravenous Q8H PRN Olive Romero MD       • sodium chloride 0.9 % flush 10 mL  10 mL Intravenous Q12H Olive Romero MD   10 mL at 12/14/22 1055   • sodium chloride 0.9 % flush 10 mL  10 mL Intravenous PRN Olive Romero MD         Orders (last 72 hrs)      Start     Ordered    12/13/22 1027  Prepare RBC, 2 Units  Blood - Once         12/13/22 1027    12/13/22 1026  Antibody Identification  Once         12/13/22 1025    12/13/22 0500  CBC & Differential  Every Third Day       12/11/22 0700    12/13/22 0500  Type & Screen  Every 72 Hours       12/11/22 0700    12/13/22 0500  CBC Auto Differential  PROCEDURE ONCE         12/12/22 2101    12/09/22 0545  amoxicillin (AMOXIL) capsule 500 mg  Every 8 Hours        See Hyperspace for full Linked Orders Report.    12/07/22 0550    12/08/22 1530  lactated ringers infusion  Every 6 Hours PRN         12/08/22 1352    12/07/22 0900  sodium chloride 0.9 % flush 10 mL  Every 12 Hours Scheduled         12/07/22 0550    12/07/22 0546  ondansetron (ZOFRAN) tablet 4 mg  Every 8 Hours PRN        See Hyperspace for full Linked Orders Report.    12/07/22 0550    12/07/22 0546  ondansetron (ZOFRAN) injection 4 mg  Every 8 Hours PRN        See Hyperspace for full Linked Orders Report.    12/07/22 0550    12/07/22 0541  lidocaine PF 1% (XYLOCAINE) injection 5 mL  As Needed         12/07/22 0550    12/07/22 0541  sodium chloride 0.9 % flush 10 mL  As Needed         12/07/22 0550    Unscheduled  Position Change - For Intra-Uterine Resusitation for Hypertonus, HyperStimulation or Non-Reassuring Fetal Status  As Needed       12/07/22 0550    --  prenatal vitamin (prenatal, CLASSIC, vitamin) tablet  Daily         12/07/22 0532              June Roe DO   Physician  OB/GYN  Progress Notes     Signed  Date of Service:  12/14/22 0820  Creation Time:  12/14/22 0820     Signed        Expand All Collapse All[]Expand All by Default  [facility]  [place]  Cassandra Del Cid  :   1990  MRN:   [id number]  CSN:    [id number]     Antepartum Progress Note        Subjective      Cassandra Del Cid is a 32 y.o. year old  with an Estimated Date of Delivery: 23 currently at 29w4d who initially presented with leaking fluid.  The patient currently reports leaking fluid.     Prenatal care has been with Janet. She was transferred due to gestational age at ROM.  It has been benign.              Objective         Min/max vitals past 24 hours:   Temp  Min: 97 °F (36.1 °C)  Max: 98.3 °F (36.8 °C)  BP  Min: 97/52  Max: 115/60  Pulse  Min: 83  Max: 115  Resp  Min: 16  Max: 18                 General: well developed; well nourished  no acute distress    Heart: Not performed.    Lungs: breathing is unlabored    Abdomen: soft, non-tender; no masses  no umbilical or inguinal hernias are present  no hepato-splenomegaly  fundus firm and non-tender    FHT's: reassuring and category 1    Cervix: was not checked.   Contractions: none                  Assessment      1. IUP at 29w4d  2. GBS unknown   3. PPROM s/p latency antibiotics            Plan      1. MFM visit today   2. Continue to monitor patient   3. SCDs at night   4. Monitor for signs of chorioamnionitis      June Roe DO  2022  08:21 CST                               Tom Tena MD   Physician  OB/GYN  Progress Notes     Signed  Date of Service:  22  Creation Time:  22     Signed        Expand All Collapse All[]Expand All by Default     Tom Tena MD  [facility]  [address]  [place] [address]  Office [phone]  Fax [phone]        [facility]  Cassandra Del Cid  :   1990  MRN:   [id number]  CSN:    [id number]     Antepartum Progress note        Subjective      She reports is having no problems and is noting fluid leakage. Leakage clear but minimal. No contractions. No bleeding. No discharge. Endorses appropriate fetal movement.             Objective      Min/max vitals past 24 hours:  Temp  Min: 97.5 °F (36.4 °C)  Max: 98.5 °F (36.9 °C)   BP  Min: 99/65  Max: 107/68   Pulse  Min: 79  Max: 95   Resp  Min: 16  Max: 18         General: Well Developed, Well Nourished, not in acute distress   HEENT: normocephalic, atraumatic, conjunctiva non-icteric    Respiratory: non-labored, symmetrical chest rise   Cardiovascular: regular rate, no JVD  Gastrointestinal: gravid, soft, no TTP, no rebound tenderness or guarding to palpation, no palpable ctx   Neurologic: alert and oriented, CN II-XII grossly intact without focal deficit  Psychiatric: normal mood, pleasant, cooperative  Musculoskeletal: negative calf tenderness, no lower extremity edema  Skin: warm & dry, no cyanosis, no jaundice     Fetal Monitoring  FHT's: reactive, rare prolonged decelerations (last noted at 0544 this morning but returned to baseline and with moderate variability)  external monitors used   Contractions: none            Assessment      4. IUP at 29w2d  5.  Prelabor rupture of membranes  -s/p BMZ -  -latency ABX -  3. Breech presentation           Plan       -continue inpatient care secondary to PPROM  -c/w PO latency antibiotics, day #6  -Continuous electronic fetal monitoring as with intermittent decels  -weekly MFM visit and ultrasound (plan is for )     Tom Tena MD  2022  07:44 CST

## 2022-12-14 NOTE — PROGRESS NOTES
VASILIY HENRY    Patient Active Problem List    Diagnosis Date Noted   •  premature rupture of membranes (PPROM) with unknown onset of labor 2022   • Vaginal bleeding during pregnancy, antepartum 2022   • [place] isoimmunization during pregnancy 08/10/2022      premature rupture of membranes (PPROM) with unknown onset of labor  Now 28 2/7 weeks  Feeling well  Denies pain or contractions  Consistent fetal movement  VSS, afeb    An US was performed today.   Please see AS for the full report.   Breech presentation  Overall fluid volume has decreased. Single pocket of fluid, 3.8cm  BPP     No completed latency antibiotics and steroids  Recommend continued inpatient management secondary to potential for change in maternal and/or fetal status.   If suspicion of infection and/or labor, recommend delivery.     All questions answered. Time on floor/unit for chart review, patient evaluation, discussion and coordination of care was >30 minutes with >50% in face to face counseling and coordination of care on the issues as listed above.       As always, if there are any questions/concerns, please do not hesitate to contact me.   Thanks  Ana  [phone]

## 2022-12-14 NOTE — PROGRESS NOTES
[facility]  Cassandra Del Cid  : 1990  MRN: [id number]  CSN: [id number]    Antepartum Progress Note    Subjective   Cassandra Del Cid is a 32 y.o. year old  with an Estimated Date of Delivery: 23 currently at 29w4d who initially presented with leaking fluid.  The patient currently reports leaking fluid.    Prenatal care has been with Janet. She was transferred due to gestational age at ROM.  It has been benign.       Objective     Min/max vitals past 24 hours:   Temp  Min: 97 °F (36.1 °C)  Max: 98.3 °F (36.8 °C)  BP  Min: 97/52  Max: 115/60  Pulse  Min: 83  Max: 115  Resp  Min: 16  Max: 18         General: well developed; well nourished  no acute distress   Heart: Not performed.   Lungs: breathing is unlabored   Abdomen: soft, non-tender; no masses  no umbilical or inguinal hernias are present  no hepato-splenomegaly  fundus firm and non-tender   FHT's: reassuring and category 1   Cervix: was not checked.   Contractions: none           Assessment   1. IUP at 29w4d  2. GBS unknown   3. PPROM s/p latency antibiotics      Plan   1. [facility] visit today   2. Continue to monitor patient   3. SCDs at night   4. Monitor for signs of chorioamnionitis     June Roe DO  2022  08:21 CST

## 2022-12-14 NOTE — PLAN OF CARE
Goal Outcome Evaluation:  Plan of Care Reviewed With: patient    Pt continues to remain pain free and afebrile. No abd tenderness noted

## 2022-12-15 PROCEDURE — 99231 SBSQ HOSP IP/OBS SF/LOW 25: CPT | Performed by: OBSTETRICS & GYNECOLOGY

## 2022-12-15 RX ADMIN — Medication 10 ML: at 21:24

## 2022-12-15 NOTE — PLAN OF CARE
Goal Outcome Evaluation:              Outcome Evaluation: Patient continously monitored in LDR, denies any contractions or pain, declines SCDs at this time, IID, up ad geovanny in room, will continue to monitor.

## 2022-12-15 NOTE — PROGRESS NOTES
Francisco Olson MD  [facility]  [address]  [place] [id number]  Office [phone]  Fax [phone]      [facility]  Cassandra Del Cid  : 1990  MRN: [id number]  CSN: [id number]    Antepartum Progress Note    Subjective   Cassandra is 29 weeks 5 days gestational age today admitted for premature rupture of membranes.  She denies contractions but her fluid has turned a little bloody this morning.  She reports good fetal movement and otherwise feels well.     Objective     Min/max vitals past 24 hours:   Temp  Min: 97.6 °F (36.4 °C)  Max: 98.7 °F (37.1 °C)  BP  Min: 100/58  Max: 116/76  Pulse  Min: 78  Max: 99  Resp  Min: 16  Max: 16         General: well developed; well nourished  no acute distress   Heart: regular rate and rhythm   Lungs: breathing is unlabored   Abdomen: soft, non-tender; no masses  Uterine fundus nontender   FHT's: reassuring with moderate variability and no decelerations.  Review of tracing over last 24 hours reveals occasional variable decelerations and other low amplitude spontaneous decelerations.  external monitors used   Cervix: was not checked.   Contractions: none   Extremities: No calf tenderness           Assessment   1. IUP at 29w5d  2. Premature rupture membranes     Plan   1. She has completed latency antibiotics and steroid course.  Given the intermittent decelerations, we will continue with continuous monitoring.  Continue to monitor for signs of infection or labor.  The bloody fluid is new today and we will monitor for bleeding.  If she remains pregnant for much longer we will consider physical therapy to help minimize deconditioning from prolonged hospital stay.    Francisco Olson MD  12/15/2022  09:00 CST

## 2022-12-15 NOTE — NURSING NOTE
Patient called out stating she was bleeding when she got up to the bathroom. Small dot of blood noted on chux and gown then the satya-pad had a small amount of blood that appeared to be mixed with amniotic fluid.

## 2022-12-16 ENCOUNTER — TELEPHONE (OUTPATIENT)
Dept: OBGYN CLINIC | Age: 32
End: 2022-12-16

## 2022-12-16 LAB
ABO GROUP BLD: NORMAL
BASOPHILS # BLD AUTO: 0.04 10*3/MM3 (ref 0–0.2)
BASOPHILS NFR BLD AUTO: 0.5 % (ref 0–1.5)
BH BB BLOOD EXPIRATION DATE: NORMAL
BH BB BLOOD EXPIRATION DATE: NORMAL
BH BB BLOOD TYPE BARCODE: 9500
BH BB BLOOD TYPE BARCODE: 9500
BH BB DISPENSE STATUS: NORMAL
BH BB DISPENSE STATUS: NORMAL
BH BB PRODUCT CODE: NORMAL
BH BB PRODUCT CODE: NORMAL
BH BB UNIT NUMBER: NORMAL
BH BB UNIT NUMBER: NORMAL
BLD GP AB SCN SERPL QL: POSITIVE
CROSSMATCH INTERPRETATION: NORMAL
CROSSMATCH INTERPRETATION: NORMAL
DEPRECATED RDW RBC AUTO: 48.7 FL (ref 37–54)
EOSINOPHIL # BLD AUTO: 0.09 10*3/MM3 (ref 0–0.4)
EOSINOPHIL NFR BLD AUTO: 1 % (ref 0.3–6.2)
ERYTHROCYTE [DISTWIDTH] IN BLOOD BY AUTOMATED COUNT: 13.5 % (ref 12.3–15.4)
HCT VFR BLD AUTO: 34.8 % (ref 34–46.6)
HGB BLD-MCNC: 11.3 G/DL (ref 12–15.9)
IMM GRANULOCYTES # BLD AUTO: 0.2 10*3/MM3 (ref 0–0.05)
IMM GRANULOCYTES NFR BLD AUTO: 2.3 % (ref 0–0.5)
LYMPHOCYTES # BLD AUTO: 1.4 10*3/MM3 (ref 0.7–3.1)
LYMPHOCYTES NFR BLD AUTO: 15.8 % (ref 19.6–45.3)
MCH RBC QN AUTO: 31.8 PG (ref 26.6–33)
MCHC RBC AUTO-ENTMCNC: 32.5 G/DL (ref 31.5–35.7)
MCV RBC AUTO: 98 FL (ref 79–97)
MONOCYTES # BLD AUTO: 0.54 10*3/MM3 (ref 0.1–0.9)
MONOCYTES NFR BLD AUTO: 6.1 % (ref 5–12)
NEUTROPHILS NFR BLD AUTO: 6.58 10*3/MM3 (ref 1.7–7)
NEUTROPHILS NFR BLD AUTO: 74.3 % (ref 42.7–76)
NRBC BLD AUTO-RTO: 0 /100 WBC (ref 0–0.2)
PLATELET # BLD AUTO: 211 10*3/MM3 (ref 140–450)
PMV BLD AUTO: 9.6 FL (ref 6–12)
RBC # BLD AUTO: 3.55 10*6/MM3 (ref 3.77–5.28)
RESIDUAL RHIG DETECTED: NORMAL
RH BLD: NEGATIVE
T&S EXPIRATION DATE: NORMAL
UNIT  ABO: NORMAL
UNIT  ABO: NORMAL
UNIT  RH: NORMAL
UNIT  RH: NORMAL
WBC NRBC COR # BLD: 8.85 10*3/MM3 (ref 3.4–10.8)

## 2022-12-16 PROCEDURE — 86900 BLOOD TYPING SEROLOGIC ABO: CPT | Performed by: OBSTETRICS & GYNECOLOGY

## 2022-12-16 PROCEDURE — 85025 COMPLETE CBC W/AUTO DIFF WBC: CPT | Performed by: OBSTETRICS & GYNECOLOGY

## 2022-12-16 PROCEDURE — 86850 RBC ANTIBODY SCREEN: CPT | Performed by: OBSTETRICS & GYNECOLOGY

## 2022-12-16 PROCEDURE — 86870 RBC ANTIBODY IDENTIFICATION: CPT | Performed by: OBSTETRICS & GYNECOLOGY

## 2022-12-16 PROCEDURE — 86901 BLOOD TYPING SEROLOGIC RH(D): CPT | Performed by: OBSTETRICS & GYNECOLOGY

## 2022-12-16 PROCEDURE — 86920 COMPATIBILITY TEST SPIN: CPT

## 2022-12-16 PROCEDURE — 86922 COMPATIBILITY TEST ANTIGLOB: CPT

## 2022-12-16 PROCEDURE — 86902 BLOOD TYPE ANTIGEN DONOR EA: CPT

## 2022-12-16 PROCEDURE — 99232 SBSQ HOSP IP/OBS MODERATE 35: CPT | Performed by: OBSTETRICS & GYNECOLOGY

## 2022-12-16 RX ADMIN — Medication 10 ML: at 20:21

## 2022-12-16 NOTE — TELEPHONE ENCOUNTER
Pt called letting us know that she went to [address] [facility] on 12/07/22 due to water breaking. They transferred her to [facility] since she was almost 29 weeks. Pt noticed she was marked a no show on 12/11/22 in our office and wanted Alex Salamanca and Dr. Jyothi Petty to know [facility] told her she isn't leaving until she has her baby.

## 2022-12-16 NOTE — PROGRESS NOTES
[facility]     Progress Note    Patient Name: Cassandra Del Cid  : 1990  MRN: [id number]  Primary Care Physician:  Magaly Hatch APRN  Date of admission: 2022    Subjective   Subjective     Chief Complaint: PPROM    History of Present Illness  Patient seen and examined.  Discussed with nursing staff.  Chart reviewed.  Events noted.    She has now completed her betamethasone course.  She has now completed 7 days of antibiotics.  She continues to remain on labor and delivery with continuous monitoring secondary to nonperiodic decelerations.  Overall, fetal status remains reassuring.    Of note, maternal-fetal medicine is dating her differently.  They are closed today.  This will need to be rectified next week.  There is a 9-day difference between what the hospital chart says and what maternal-fetal medicine is considering.  Although that does not mean much today, it will mean more whenever she delivers as she may, or may not, need neuroprotective magnesium.    She reports active fetal movement.  She has no pain.  She has noted some blood tinge in her fluid.  She denies contractions.    Patient Reports see above    Review of Systems   Genitourinary:        Leakage of fluid   All other systems reviewed and are negative.      Objective   Objective     Vitals:   Temp:  [97.6 °F (36.4 °C)-98.5 °F (36.9 °C)] 97.7 °F (36.5 °C)  Heart Rate:  [78-90] 80  Resp:  [16-17] 17  BP: ()/(55-63) 108/59    Physical Exam  Vitals and nursing note reviewed. Exam conducted with a chaperone present.   Constitutional:       Appearance: Normal appearance. She is normal weight.   HENT:      Head: Normocephalic.   Cardiovascular:      Rate and Rhythm: Normal rate.      Pulses: Normal pulses.   Pulmonary:      Effort: Pulmonary effort is normal.   Abdominal:      General: Abdomen is flat.      Palpations: Abdomen is soft.      Comments: Fundus is appropriate.  It is nontender.   Musculoskeletal:         General: Normal  range of motion.   Skin:     General: Skin is warm.   Neurological:      General: No focal deficit present.      Mental Status: She is alert and oriented to person, place, and time. Mental status is at baseline.   Psychiatric:         Mood and Affect: Mood normal.         Behavior: Behavior normal.         Thought Content: Thought content normal.         Judgment: Judgment normal.     Category 2 NST due to 9 periodic decelerations  Variability is reassuring  Accelerations are present  Tocometer is quiet.     Result Review    Result Review:  I have personally reviewed the results from the time of this admission to 2022 08:44 CST and agree with these findings:  [x]  Laboratory list / accordion  []  Microbiology  [x]  Radiology  []  EKG/Telemetry   []  Cardiology/Vascular   []  Pathology  []  Old records  []  Other:  Most notable findings include: Normal white blood cell count.  Recent ultrasound this week with maternal-fetal medicine showing a BPP of 8 out of 8 and RADHA with a single pocket of 3.8 cm.      Assessment & Plan   Assessment / Plan     Brief Patient Summary:  Casasndra Del Cid is a 32 y.o. female who was admitted as a transfer for  premature rupture membranes.  She is status post betamethasone and a full weeks worth of latency antibiotics.  She remained stable at this time.    Active Hospital Problems:  Active Hospital Problems    Diagnosis    • **Threatened premature labor    •  premature rupture of membranes (PPROM) with unknown onset of labor    • Breech presentation with  problem      Plan:   Continue management on labor delivery for now  Will need to investigate reasons behind the dating discrepancy and choose the one that is best for this patient.  This will certainly, come into play whenever delivery is indicated as neuroprotective mag would be indicated at less than 32 weeks.  Patient is updated.  Questions answered.  She verbalizes understanding.  Of note, patient was  noted to continue to be in breech presentation on her ultrasound 2 days ago.  When delivery is indicated, if malpresentation continues, which it likely will,  section would be indicated.    DVT prophylaxis:  Mechanical DVT prophylaxis orders are present.    CODE STATUS:    Level Of Support Discussed With: Patient  Code Status (Patient has no pulse and is not breathing): CPR (Attempt to Resuscitate)  Medical Interventions (Patient has pulse or is breathing): Full Support  Release to patient: Routine Release    Disposition:  I expect patient to be discharged following delivery.    Ethan Betancourt MD

## 2022-12-16 NOTE — PLAN OF CARE
Problem: Adult Inpatient Plan of Care  Goal: Plan of Care Review  Outcome: Ongoing, Progressing  Flowsheets  Taken 12/16/2022 1654 by Nicole Adhikari RN  Plan of Care Reviewed With: patient  Taken 12/15/2022 0033 by Tiffanie Rosas RN  Outcome Evaluation: Patient continously monitored in LDR, denies any contractions or pain, declines SCDs at this time, IID, up ad geovanny in room, will continue to monitor.  Goal: Patient-Specific Goal (Individualized)  Outcome: Ongoing, Progressing  Flowsheets (Taken 12/10/2022 1655)  Anxieties, Fears or Concerns: Wants to remain pregnant as long as possible  Goal: Absence of Hospital-Acquired Illness or Injury  Outcome: Ongoing, Progressing  Intervention: Identify and Manage Fall Risk  Recent Flowsheet Documentation  Taken 12/16/2022 0750 by Nicole Adhikari, RN  Safety Promotion/Fall Prevention: safety round/check completed  Intervention: Prevent and Manage VTE (Venous Thromboembolism) Risk  Recent Flowsheet Documentation  Taken 12/16/2022 0750 by Nicole Adhikari RN  Activity Management: up ad geovanny  VTE Prevention/Management:   bilateral   sequential compression devices on  Goal: Optimal Comfort and Wellbeing  Outcome: Ongoing, Progressing  Intervention: Provide Person-Centered Care  Recent Flowsheet Documentation  Taken 12/16/2022 0750 by Nicole Adhikari RN  Trust Relationship/Rapport:   care explained   choices provided   emotional support provided   thoughts/feelings acknowledged   reassurance provided   questions encouraged   questions answered   empathic listening provided  Goal: Readiness for Transition of Care  Outcome: Ongoing, Progressing     Problem: Prelabor Rupture of Membranes  Goal: Delayed Delivery with Absence of Infection  Outcome: Ongoing, Not Progressing  Intervention: Prevent or Manage Infection Related to Membrane Rupture  Flowsheets  Taken 12/16/2022 1654 by Nicole Adhikari, RN  Infection Management: (temperatures q2h. Patient has reported  vaginal bleding that began 12/15. Bright red on pad and in toilet. Had stopped bleeding around 1300 yesterday and then it began again this afternoon) other (see comments)  Taken 12/13/2022 1916 by Tiffanie Rosas, RN  Perineal Care: absorbent brief/pad changed  Taken 12/11/2022 0715 by Nicole Adhikari, RN  Infection Prevention:   hand hygiene promoted   single patient room provided   rest/sleep promoted   equipment surfaces disinfected   Goal Outcome Evaluation:  Plan of Care Reviewed With: patient        Progress: no change

## 2022-12-16 NOTE — PAYOR COMM NOTE
REF:  [id number]    [facility]  SHAYLA,   [phone]  OR  FAX   [phone]      Del Cid Cassandra TRUONG (32 y.o. Female)     Date of Birth   1990    Social Security Number       Address   [address] [place] [address]    Home Phone   [phone]    MRN   [id number]       Faith   Tenriism    Marital Status                               Admission Date   12/7/22    Admission Type   Elective    Admitting Provider   Olive Romero MD    Attending Provider   Olive Romero MD    Department, Room/Bed   [facility] LABOR DELIVERY, L03/1       Discharge Date       Discharge Disposition       Discharge Destination                               Attending Provider: Olive Romero MD    Allergies: Penicillins    Isolation: None   Infection: None   Code Status: CPR    Ht: 162.6 cm (64\")   Wt: --    Admission Cmt: None   Principal Problem: Threatened premature labor [O47.00]                 Active Insurance as of 12/7/2022     Primary Coverage     Payor Plan Insurance Group Employer/Plan Group    [employer] [facility] Optinuity [facility] PPO [id number]     Payor Plan Address Payor Plan Phone Number Payor Plan Fax Number Effective Dates    [address] [phone]  7/1/2022 - None Entered    [place] [address]       Subscriber Name Subscriber Birth Date Member ID       EUGENIA DEL CID 8/6/1994 [id number]                 Emergency Contacts      (Rel.) Home Phone Work Phone Mobile Phone    Eugenia Del Cid (Spouse) [phone] -- --        Tiffanie Rosas RN   Registered Nurse  Obstetrics  Plan of Care     Signed  Date of Service:  12/15/22 0034  Creation Time:  12/15/22 0034     Signed        Goal Outcome Evaluation:  Outcome Evaluation: Patient continously monitored in LDR, denies any contractions or pain, declines SCDs at this time, IID, up ad geovanny in room, will continue to monitor.              Nicole Adhikari, RN   Registered Nurse  Nursing Note      Signed  Date of Service:  12/15/22 0730  Creation Time:  12/15/22 0846     Signed        Patient called out stating she was bleeding when she got up to the bathroom. Small dot of blood noted on chux and gown then the satya-pad had a small amount of blood that appeared to be mixed with amniotic fluid.               Nicole Adhikari, RN   Registered Nurse  Nursing Note     Signed  Date of Service:  12/15/22 1454  Creation Time:  12/15/22 1454     Signed        Patient states she is no longer having vaginal bleeding.                Tiffanie Rosas RN   Registered Nurse  Obstetrics  Plan of Care     Signed  Date of Service:  12/16/22 0439  Creation Time:  12/16/22 0439     Signed        Goal Outcome Evaluation:  Outcome Evaluation: Patient continously monitored in LDR, denies any contractions or pain, declines SCDs at this time, IID, up ad geovanny in room, will continue to monitor.                  Vital Signs (last 2 days)     Date/Time Temp Temp src Pulse Resp BP Patient Position SpO2    12/16/22 0748 97.7 (36.5) Oral 80 17 108/59 Lying 99    12/16/22 0445 98.2 (36.8) Oral 78 16 108/56 Lying --    12/16/22 0144 98.5 (36.9) Oral 80 16 98/55 Lying --    12/15/22 1917 98.1 (36.7) Oral 90 16 114/63 Lying 99    12/15/22 1232 97.7 (36.5) Oral -- -- -- -- --    12/15/22 1007 97.6 (36.4) Oral -- -- -- -- --    12/15/22 0600 98.4 (36.9) Oral -- -- -- -- --    12/15/22 0420 98.5 (36.9) Oral 78 16 108/58 Lying --    12/15/22 0152 98.2 (36.8) Oral -- -- -- -- --    12/15/22 0031 98.1 (36.7) Oral 79 16 106/63 Lying --    12/14/22 1913 -- -- 89 16 102/61 Lying 99    12/14/22 1900 -- -- 93 -- 109/66 -- --    12/14/22 1744 98.7 (37.1) Oral 91 16 116/76 Sitting --    12/14/22 1354 97.6 (36.4) Oral 99 16 110/61 Sitting --    12/14/22 1056 97.8 (36.6) Oral 94 16 100/58 Sitting --    12/14/22 0801 97.7 (36.5) Oral -- -- -- -- --    12/14/22 0509 98.3 (36.8) Oral 83 18 97/52 Lying --        Intake & Output (last 2 days)       12/14  0701  12/15 0700 12/15 0701 12/16 0700 12/16 0701  12/17 0700           Urine Unmeasured Occurrence 6 x          Current Facility-Administered Medications   Medication Dose Route Frequency Provider Last Rate Last Admin   • lactated ringers infusion  50 mL/hr Intravenous Q6H PRN Francisco Olson MD 50 mL/hr at 12/08/22 1521 50 mL/hr at 12/08/22 1521   • lidocaine PF 1% (XYLOCAINE) injection 5 mL  5 mL Intradermal PRN Olive Romero MD       • ondansetron (ZOFRAN) tablet 4 mg  4 mg Oral Q8H PRN Olive Romero MD        Or   • ondansetron (ZOFRAN) injection 4 mg  4 mg Intravenous Q8H PRN Olive Romero MD       • sodium chloride 0.9 % flush 10 mL  10 mL Intravenous Q12H Olive Romero MD   10 mL at 12/15/22 2124   • sodium chloride 0.9 % flush 10 mL  10 mL Intravenous PRN Olive Romero MD         Orders (last 48 hrs)      Start     Ordered    12/16/22 0500  CBC Auto Differential  PROCEDURE ONCE         12/15/22 2101    12/13/22 0500  CBC & Differential  Every Third Day       12/11/22 0700    12/13/22 0500  Type & Screen  Every 72 Hours       12/11/22 0700    12/08/22 1530  lactated ringers infusion  Every 6 Hours PRN         12/08/22 1352    12/07/22 0900  sodium chloride 0.9 % flush 10 mL  Every 12 Hours Scheduled         12/07/22 0550    12/07/22 0546  ondansetron (ZOFRAN) tablet 4 mg  Every 8 Hours PRN        See Hyperspace for full Linked Orders Report.    12/07/22 0550    12/07/22 0546  ondansetron (ZOFRAN) injection 4 mg  Every 8 Hours PRN        See Hyperspace for full Linked Orders Report.    12/07/22 0550    12/07/22 0541  lidocaine PF 1% (XYLOCAINE) injection 5 mL  As Needed         12/07/22 0550    12/07/22 0541  sodium chloride 0.9 % flush 10 mL  As Needed         12/07/22 0550    Unscheduled  Position Change - For Intra-Uterine Resusitation for Hypertonus, HyperStimulation or Non-Reassuring Fetal Status  As Needed       12/07/22 0550    --  prenatal vitamin (prenatal, CLASSIC, vitamin) tablet   Daily         22 0532                 Physician Progress Notes (last 72 hours)      Ethan Betancourt MD at 22 0844           [facility]     Progress Note    Patient Name: Cassandra Del Cid  : 1990  MRN: [id number]  Primary Care Physician:  Magaly Hatch APRN  Date of admission: 2022    Subjective   Subjective     Chief Complaint: PPROM    History of Present Illness  Patient seen and examined.  Discussed with nursing staff.  Chart reviewed.  Events noted.    She has now completed her betamethasone course.  She has now completed 7 days of antibiotics.  She continues to remain on labor and delivery with continuous monitoring secondary to nonperiodic decelerations.  Overall, fetal status remains reassuring.    Of note, maternal-fetal medicine is dating her differently.  They are closed today.  This will need to be rectified next week.  There is a 9-day difference between what the hospital chart says and what maternal-fetal medicine is considering.  Although that does not mean much today, it will mean more whenever she delivers as she may, or may not, need neuroprotective magnesium.    She reports active fetal movement.  She has no pain.  She has noted some blood tinge in her fluid.  She denies contractions.    Patient Reports see above    Review of Systems   Genitourinary:        Leakage of fluid   All other systems reviewed and are negative.      Objective   Objective     Vitals:   Temp:  [97.6 °F (36.4 °C)-98.5 °F (36.9 °C)] 97.7 °F (36.5 °C)  Heart Rate:  [78-90] 80  Resp:  [16-17] 17  BP: ()/(55-63) 108/59    Physical Exam  Vitals and nursing note reviewed. Exam conducted with a chaperone present.   Constitutional:       Appearance: Normal appearance. She is normal weight.   HENT:      Head: Normocephalic.   Cardiovascular:      Rate and Rhythm: Normal rate.      Pulses: Normal pulses.   Pulmonary:      Effort: Pulmonary effort is normal.   Abdominal:      General: Abdomen is  flat.      Palpations: Abdomen is soft.      Comments: Fundus is appropriate.  It is nontender.   Musculoskeletal:         General: Normal range of motion.   Skin:     General: Skin is warm.   Neurological:      General: No focal deficit present.      Mental Status: She is alert and oriented to person, place, and time. Mental status is at baseline.   Psychiatric:         Mood and Affect: Mood normal.         Behavior: Behavior normal.         Thought Content: Thought content normal.         Judgment: Judgment normal.     Category 2 NST due to 9 periodic decelerations  Variability is reassuring  Accelerations are present  Tocometer is quiet.     Result Review    Result Review:  I have personally reviewed the results from the time of this admission to 2022 08:44 CST and agree with these findings:  [x]  Laboratory list / accordion  []  Microbiology  [x]  Radiology  []  EKG/Telemetry   []  Cardiology/Vascular   []  Pathology  []  Old records  []  Other:  Most notable findings include: Normal white blood cell count.  Recent ultrasound this week with maternal-fetal medicine showing a BPP of 8 out of 8 and RADHA with a single pocket of 3.8 cm.      Assessment & Plan   Assessment / Plan     Brief Patient Summary:  Cassandra Del Cid is a 32 y.o. female who was admitted as a transfer for  premature rupture membranes.  She is status post betamethasone and a full weeks worth of latency antibiotics.  She remained stable at this time.    Active Hospital Problems:  Active Hospital Problems    Diagnosis    • **Threatened premature labor    •  premature rupture of membranes (PPROM) with unknown onset of labor    • Breech presentation with  problem      Plan:   Continue management on labor delivery for now  Will need to investigate reasons behind the dating discrepancy and choose the one that is best for this patient.  This will certainly, come into play whenever delivery is indicated as neuroprotective mag  would be indicated at less than 32 weeks.  Patient is updated.  Questions answered.  She verbalizes understanding.  Of note, patient was noted to continue to be in breech presentation on her ultrasound 2 days ago.  When delivery is indicated, if malpresentation continues, which it likely will,  section would be indicated.    DVT prophylaxis:  Mechanical DVT prophylaxis orders are present.    CODE STATUS:    Level Of Support Discussed With: Patient  Code Status (Patient has no pulse and is not breathing): CPR (Attempt to Resuscitate)  Medical Interventions (Patient has pulse or is breathing): Full Support  Release to patient: Routine Release    Disposition:  I expect patient to be discharged following delivery.    Ethan Betancourt MD             Electronically signed by Ethan Betancourt MD at 22 0870     Francisco Olson MD at 12/15/22 0900              Francisco Olson MD  [facility]  [address] [place] [address]  Office [phone]  Fax [phone]      [facility]  Cassandra Del Cid  : 1990  MRN: [id number]  CSN: [id number]    Antepartum Progress Note    Subjective   Cassandra is 29 weeks 5 days gestational age today admitted for premature rupture of membranes.  She denies contractions but her fluid has turned a little bloody this morning.  She reports good fetal movement and otherwise feels well.    Objective     Min/max vitals past 24 hours:   Temp  Min: 97.6 °F (36.4 °C)  Max: 98.7 °F (37.1 °C)  BP  Min: 100/58  Max: 116/76  Pulse  Min: 78  Max: 99  Resp  Min: 16  Max: 16         General: well developed; well nourished  no acute distress   Heart: regular rate and rhythm   Lungs: breathing is unlabored   Abdomen: soft, non-tender; no masses  Uterine fundus nontender   FHT's: reassuring with moderate variability and no decelerations.  Review of tracing over last 24 hours reveals occasional variable decelerations and other low amplitude spontaneous  decelerations.  external monitors used   Cervix: was not checked.   Contractions: none   Extremities: No calf tenderness          Assessment   1. IUP at 29w5d  2. Premature rupture membranes    Plan   1. She has completed latency antibiotics and steroid course.  Given the intermittent decelerations, we will continue with continuous monitoring.  Continue to monitor for signs of infection or labor.  The bloody fluid is new today and we will monitor for bleeding.  If she remains pregnant for much longer we will consider physical therapy to help minimize deconditioning from prolonged hospital stay.    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   Please see AS for the full report.   Breech presentation  Overall fluid volume has decreased. Single pocket of fluid, 3.8cm  BPP     No completed latency antibiotics and steroids  Recommend continued inpatient management secondary to potential for change in maternal and/or fetal status.   If suspicion of infection and/or labor, recommend delivery.     All questions answered. Time on floor/unit for chart review, patient evaluation, discussion and coordination of care was >30 minutes with >50% in face to face counseling and coordination of care on the issues as listed above.       As always, if there are any questions/concerns, please do not hesitate to contact me.    Jonathan Perez  [phone]        Electronically signed by Luis Alberto Ryan MD at 22 1249     June Roe DO at 22 0800          [facility]  Cassandra Del Cid  : 1990  MRN: [id number]  CSN: [id number]    Antepartum Progress Note    Subjective   Cassandra Del Cid is a 32 y.o. year old  with an Estimated Date of Delivery: 23 currently at 29w4d who initially presented with leaking fluid.  The patient currently reports leaking fluid.    Prenatal care has been with Janet. She was transferred due to gestational age at ROM.  It has been benign.      Objective     Min/max vitals past 24 hours:   Temp  Min: 97 °F (36.1 °C)  Max: 98.3 °F (36.8 °C)  BP  Min: 97/52  Max: 115/60  Pulse  Min: 83  Max: 115  Resp  Min: 16  Max: 18         General: well developed; well nourished  no acute distress   Heart: Not performed.   Lungs: breathing is unlabored   Abdomen: soft, non-tender; no masses  no umbilical or inguinal hernias are present  no hepato-splenomegaly  fundus firm and non-tender   FHT's: reassuring and category 1   Cervix: was not checked.   Contractions: none          Assessment   3. IUP at 29w4d  4. GBS unknown   5. PPROM s/p latency antibiotics     Plan   2. MFM visit today   3. Continue to monitor patient   4. SCDs at night   5. Monitor for signs of chorioamnionitis     June Roe DO  2022  08:21 CST              Electronically signed by June Roe DO at 22 [phone]

## 2022-12-17 PROCEDURE — 99231 SBSQ HOSP IP/OBS SF/LOW 25: CPT | Performed by: OBSTETRICS & GYNECOLOGY

## 2022-12-17 RX ADMIN — Medication 10 ML: at 20:36

## 2022-12-17 RX ADMIN — Medication 10 ML: at 11:19

## 2022-12-17 NOTE — PLAN OF CARE
Goal Outcome Evaluation:  Plan of Care Reviewed With: patient, mother        Progress: no change  Outcome Evaluation: Patient being monitored in LDR for PPROM and new onset vaginal bleeding.  Patient allowed to shower/eat/be off monitor for 30 minutes at a time throughout the day.  Patient denies pain or cramping this shift.   Patient up to bathroom independently and voiding adequately.  Bilateral SCD's on while in bed.  Patient's mother at bedside.  Patient has completed full bethamethasone course and a week of antibiotics.  VSS, patient remains afebrile.  Sees MFM weekly.

## 2022-12-17 NOTE — PROGRESS NOTES
Tom Tena MD  [facility]  [address]  [place] [address]  Office [phone]  Fax [phone]      [facility]  Cassandra Del Cid  : 1990  MRN: [id number]  CSN: [id number]    Antepartum Progress note    Subjective   She reports is having no problems.  She reports continued leakage of fluid- clear now mixed with mucus and blood.  Fluid over the past 24 hours per patient has been pink intermittently.  Denies bright red blood currently.  Endorses appropriate fetal movement.  Denies contractions, fever, chills, nausea/vomiting.     Objective   Min/max vitals past 24 hours:  Temp  Min: 97.5 °F (36.4 °C)  Max: 98.6 °F (37 °C)   BP  Min: 90/51  Max: 122/71   Pulse  Min: 81  Max: 102   Resp  Min: 16  Max: 19        General: Well Developed, Well Nourished, not in acute distress   HEENT: normocephalic, atraumatic, conjunctiva non-icteric    Respiratory: Non-labored, symmetrical chest rise   Cardiovascular: regular rate, no JVD  Gastrointestinal: gravid, soft, no TTP, no rebound tenderness or guarding to palpation, no palpable ctx   Neurologic: alert and oriented, CN II-XII grossly intact without focal deficit  Psychiatric: normal mood, pleasant, cooperative  Musculoskeletal: negative calf tenderness, no lower extremity edema  Skin: warm & dry, no cyanosis, no jaundice    Fetal Monitoring  FHT's: reactive tracing, intermittent decelerations are noted on review of the tracing overnight and into the past 24 hours however the fetus has returned to baseline with moderate variability following these decelerations  external monitors used   Contractions: none              Result Review   US OB TRANSVAGINAL (2022 10:50 EDT)  IUP consistent with 7 weeks and 3 days, HEENA 3/6/2023    Maternal Fetal Study OB Transvaginal, Nuchal Translucency, OB < 14 Weeks (08/10/2022 10:28 EDT)  Crown-rump length consistent with 2022 ultrasound, HEENA 3/6/2030    Assessment   1. IUP at 28w5d by LMP consistent  with a 7-week and 10-week ultrasound, HEENA 3/6/2023  2.  prelabor rupture of membranes  -Status post latency antibiotics -  -Status post BMZ -   3. Breech presentation     Plan   1.   Continue inpatient care secondary to PPROM  2.  CEFM secondary to intermittent decelerations  3.  Type and screen  every 72 hours  4.  Weekly MFM evaluations and ultrasounds, occurring on   5.  Monitor for signs/symptoms of IAI  6.  Monitor for signs/symptoms of  labor  7.  Monitor for vaginal bleeding  8.  Plan for delivery at 34 weeks gestation via  section secondary to PPROM as well as breech presentation, or clinically sooner if indicated    Discussion: Prior discussion was held concerning patient's due date.  Patient states she was initially told her due date was 2023 however she also was told her due date was 3/6/2023.  Review of her medical records was undertaken and I noted 2 early ultrasounds at 7 weeks and at 10 weeks.  Both of these were consistent with each other.  This puts her due date of 3/6/2023.  Her due date was updated in the computer.  We will need to follow-up with MFM on Monday to confirm the patient's due date as they had been using a different dating criteria in their most recent notes.    Tom Tena MD  2022  09:29 CST

## 2022-12-18 PROCEDURE — 99231 SBSQ HOSP IP/OBS SF/LOW 25: CPT | Performed by: OBSTETRICS & GYNECOLOGY

## 2022-12-18 RX ADMIN — Medication 10 ML: at 20:07

## 2022-12-18 NOTE — PROGRESS NOTES
Tom Tena MD  [facility]  [address]  [place] [address]  Office [phone]  Fax [phone]      [facility]  Cassandra Del Cid  : 1990  MRN: [id number]  CSN: [id number]    Antepartum Progress note    Subjective   She reports is having no problems. Endorses appropriate fetal movement. Denies contractions or vaginal bleeding. Reports still with leakage of clear fluid.      Objective   Min/max vitals past 24 hours:  Temp  Min: 97.7 °F (36.5 °C)  Max: 98.5 °F (36.9 °C)   BP  Min: 81/47  Max: 109/65   Pulse  Min: 85  Max: 96   Resp  Min: 16  Max: 18        General: Well Developed, Well Nourished, not in acute distress   HEENT: normocephalic, atraumatic, conjunctiva non-icteric    Respiratory:  non-labored, symmetrical chest rise   Cardiovascular: regular rate, no JVD  Gastrointestinal: gravid, soft, no TTP, no rebound tenderness or guarding to palpation, no palpable ctx   Neurologic: alert and oriented, CN II-XII grossly intact without focal deficit  Psychiatric: normal mood, pleasant, cooperative  Musculoskeletal: negative calf tenderness, no lower extremity edema  Skin: warm & dry, no cyanosis, no jaundice    Fetal Monitoring  FHT's: Reactive, intermittent decelerations but with return to baseline and with moderate variability and accelerations between decelerations  external monitors used   Contractions: none      Assessment   1. IUP at 28w6d by LMP c/w 7- & 10-week ultrasounds, HEENA 3/6/23  2. PPROM  -s/p BMZ -  -s/p latency antibiotics -   3. Breech presentation   4. Rh negative, s/p RhoGAM 2022     Plan   1.   Continue inpatient care secondary to PPROM  2.  CEFM secondary to intermittent decelerations  3.  Type and screen  every 72 hours  4.  Weekly MFM evaluations and ultrasounds, occurring on   5.  Monitor for signs/symptoms of IAI  6.  Monitor for signs/symptoms of  labor  7.  Monitor for vaginal bleeding  8.  Plan for delivery at 34  weeks gestation via  section secondary to PPROM as well as breech presentation, or clinically sooner if indicated    Follow-up with MFM on Monday, 2022 to confirm patient's HEENA as there is differences in patient's stated gestational age from prior notes. She has passed her 1-hr GTT and received rhogam on 2022    Tom Tena MD  2022  08:19 CST

## 2022-12-18 NOTE — PLAN OF CARE
Goal Outcome Evaluation:  Plan of Care Reviewed With: patient        Progress: no change  Outcome Evaluation: PPROM on 12/7/22; continuous fetal monitoring; regular diet; minimal leaking today, no r/o blood; VSS; no pain/contractions; BRP; MFM on Wed.

## 2022-12-18 NOTE — PLAN OF CARE
Goal Outcome Evaluation:  Plan of Care Reviewed With: patient, mother        Progress: no change  Outcome Evaluation: Patient being monitored for PPROM and new onset vaginal bleeding.  Patient reported no vaginal bleeding during the shift.  Patient allowed to shower/eat/be off the monitor for 30 minutes at a time throughout the day.  Patient denies pain or cramping this shift.  Up to bathroom independently, urine output adequate.  Bilateral SCD's in place.  VSS, patient remains afebrile.  Patient sees MFM weekly.  Patient has completed full course of bethamethasone and a week of latency antibiotics.  Mother at bedside.

## 2022-12-19 LAB
ABO GROUP BLD: NORMAL
BASOPHILS # BLD AUTO: 0.04 10*3/MM3 (ref 0–0.2)
BASOPHILS NFR BLD AUTO: 0.5 % (ref 0–1.5)
BH BB BLOOD EXPIRATION DATE: NORMAL
BH BB BLOOD EXPIRATION DATE: NORMAL
BH BB BLOOD TYPE BARCODE: 9500
BH BB BLOOD TYPE BARCODE: 9500
BH BB DISPENSE STATUS: NORMAL
BH BB DISPENSE STATUS: NORMAL
BH BB PRODUCT CODE: NORMAL
BH BB PRODUCT CODE: NORMAL
BH BB UNIT NUMBER: NORMAL
BH BB UNIT NUMBER: NORMAL
BLD GP AB SCN SERPL QL: POSITIVE
CROSSMATCH INTERPRETATION: NORMAL
CROSSMATCH INTERPRETATION: NORMAL
DEPRECATED RDW RBC AUTO: 49.1 FL (ref 37–54)
EOSINOPHIL # BLD AUTO: 0.05 10*3/MM3 (ref 0–0.4)
EOSINOPHIL NFR BLD AUTO: 0.6 % (ref 0.3–6.2)
ERYTHROCYTE [DISTWIDTH] IN BLOOD BY AUTOMATED COUNT: 13.7 % (ref 12.3–15.4)
HCT VFR BLD AUTO: 32.7 % (ref 34–46.6)
HGB BLD-MCNC: 10.9 G/DL (ref 12–15.9)
IMM GRANULOCYTES # BLD AUTO: 0.16 10*3/MM3 (ref 0–0.05)
IMM GRANULOCYTES NFR BLD AUTO: 1.9 % (ref 0–0.5)
LYMPHOCYTES # BLD AUTO: 1.3 10*3/MM3 (ref 0.7–3.1)
LYMPHOCYTES NFR BLD AUTO: 15.2 % (ref 19.6–45.3)
MCH RBC QN AUTO: 32.2 PG (ref 26.6–33)
MCHC RBC AUTO-ENTMCNC: 33.3 G/DL (ref 31.5–35.7)
MCV RBC AUTO: 96.7 FL (ref 79–97)
MONOCYTES # BLD AUTO: 0.49 10*3/MM3 (ref 0.1–0.9)
MONOCYTES NFR BLD AUTO: 5.7 % (ref 5–12)
NEUTROPHILS NFR BLD AUTO: 6.51 10*3/MM3 (ref 1.7–7)
NEUTROPHILS NFR BLD AUTO: 76.1 % (ref 42.7–76)
NRBC BLD AUTO-RTO: 0 /100 WBC (ref 0–0.2)
PLATELET # BLD AUTO: 215 10*3/MM3 (ref 140–450)
PMV BLD AUTO: 9.9 FL (ref 6–12)
RBC # BLD AUTO: 3.38 10*6/MM3 (ref 3.77–5.28)
RESIDUAL RHIG DETECTED: NORMAL
RH BLD: NEGATIVE
T&S EXPIRATION DATE: NORMAL
UNIT  ABO: NORMAL
UNIT  ABO: NORMAL
UNIT  RH: NORMAL
UNIT  RH: NORMAL
WBC NRBC COR # BLD: 8.55 10*3/MM3 (ref 3.4–10.8)

## 2022-12-19 PROCEDURE — 86902 BLOOD TYPE ANTIGEN DONOR EA: CPT

## 2022-12-19 PROCEDURE — 86922 COMPATIBILITY TEST ANTIGLOB: CPT

## 2022-12-19 PROCEDURE — 86901 BLOOD TYPING SEROLOGIC RH(D): CPT | Performed by: OBSTETRICS & GYNECOLOGY

## 2022-12-19 PROCEDURE — 86920 COMPATIBILITY TEST SPIN: CPT

## 2022-12-19 PROCEDURE — 86850 RBC ANTIBODY SCREEN: CPT | Performed by: OBSTETRICS & GYNECOLOGY

## 2022-12-19 PROCEDURE — 85025 COMPLETE CBC W/AUTO DIFF WBC: CPT | Performed by: OBSTETRICS & GYNECOLOGY

## 2022-12-19 PROCEDURE — 86870 RBC ANTIBODY IDENTIFICATION: CPT | Performed by: OBSTETRICS & GYNECOLOGY

## 2022-12-19 PROCEDURE — 99231 SBSQ HOSP IP/OBS SF/LOW 25: CPT | Performed by: OBSTETRICS & GYNECOLOGY

## 2022-12-19 PROCEDURE — 86900 BLOOD TYPING SEROLOGIC ABO: CPT | Performed by: OBSTETRICS & GYNECOLOGY

## 2022-12-19 RX ADMIN — Medication 10 ML: at 09:10

## 2022-12-19 NOTE — PLAN OF CARE
Goal Outcome Evaluation:  Plan of Care Reviewed With: patient, mother        Progress: no change  Outcome Evaluation: Patient in L&D for continuous EFM for PPROM on 12/7/2022.  Patient reported a small amount of clear fluid when up to bathroom tonight.  Patient denies any vaginal bleeding.  Patient denies pain/contractions/cramping.  VSS, patient remains afebrile.  Bilateral SCD's in place while in bed.  Sees MFM weekly on Wednesdays.

## 2022-12-19 NOTE — PAYOR COMM NOTE
12/18 CLINICAL  [id number]   [phone]    Cassandra Del Cid (32 y.o. Female)     Date of Birth   1990    Social Security Number       Address   [address] [place] [address]    Home Phone   [phone]    MRN   [id number]       [facility]    Marital Status                               Admission Date   12/7/22    Admission Type   Elective    Admitting Provider   Olive Romero MD    Attending Provider   Olive Romero MD    Department, Room/Bed   [facility] LABOR DELIVERY, L03/1       Discharge Date       Discharge Disposition       Discharge Destination                               Attending Provider: Olive Romero MD    Allergies: Penicillins    Isolation: None   Infection: None   Code Status: CPR    Ht: 162.6 cm (64\")   Wt: --    Admission Cmt: None   Principal Problem: Threatened premature labor [O47.00]                 Active Insurance as of 12/7/2022     Primary Coverage     Payor Plan Insurance Group Employer/Plan Group    [employer]HEM BLUE CROSS ANTHEM BLUE CROSS BLUE SHIELD PPO [id number]     Payor Plan Address Payor Plan Phone Number Payor Plan Fax Number Effective Dates    [address] [phone]  7/1/2022 - None Entered    [place] [id number]       Subscriber Name Subscriber Birth Date Member ID       EUGENIA DEL CID 8/6/1994 [id number]                 Emergency Contacts      (Rel.) Home Phone Work Phone Mobile Phone    Eugenia Del Cid (Spouse) [phone] -- --              Current Facility-Administered Medications   Medication Dose Route Frequency Provider Last Rate Last Admin   • lactated ringers infusion  50 mL/hr Intravenous Q6H PRN Francisco Olson MD 50 mL/hr at 12/08/22 1521 50 mL/hr at 12/08/22 1521   • lidocaine PF 1% (XYLOCAINE) injection 5 mL  5 mL Intradermal PRN Olive Romero MD       • ondansetron (ZOFRAN) tablet 4 mg  4 mg Oral Q8H PRN Olive Romero MD        Or   • ondansetron (ZOFRAN) injection 4 mg  4 mg Intravenous Q8H  PRN Olive Romero MD       • sodium chloride 0.9 % flush 10 mL  10 mL Intravenous Q12H Olive Romero MD   10 mL at 22   • sodium chloride 0.9 % flush 10 mL  10 mL Intravenous PRN Olive Romero MD         Orders (last 48 hrs)      Start     Ordered    22 0500  CBC Auto Differential  PROCEDURE ONCE         22 2101    22 0500  CBC & Differential  Every Third Day       22 0700    22 0500  Type & Screen  Every 72 Hours       22 0700    22 1530  lactated ringers infusion  Every 6 Hours PRN         22 1352    22 0900  sodium chloride 0.9 % flush 10 mL  Every 12 Hours Scheduled         22 0550    22 0546  ondansetron (ZOFRAN) tablet 4 mg  Every 8 Hours PRN        See Hyperspace for full Linked Orders Report.    22 0550    22 0546  ondansetron (ZOFRAN) injection 4 mg  Every 8 Hours PRN        See Hyperspace for full Linked Orders Report.    22 0550    22 0541  lidocaine PF 1% (XYLOCAINE) injection 5 mL  As Needed         22 0550    22 0541  sodium chloride 0.9 % flush 10 mL  As Needed         22 0550    Unscheduled  Position Change - For Intra-Uterine Resusitation for Hypertonus, HyperStimulation or Non-Reassuring Fetal Status  As Needed       22 0550    --  prenatal vitamin (prenatal, CLASSIC, vitamin) tablet  Daily         22 0532                 Physician Progress Notes (last 72 hours)      Tom Tena MD at 22 0819              Tom Tena MD  [facility]  [address] [place] [address] [address] [place] [address]  Office [phone]  Fax [phone]      [facility]  Cassandra Del Cid  : 1990  MRN: [id number]  CSN: [id number]    Antepartum Progress note    Subjective   She reports is having no problems. Endorses appropriate fetal movement. Denies contractions or vaginal bleeding. Reports still with leakage of clear fluid.     Objective   Min/max vitals past 24 hours:  Temp   Min: 97.7 °F (36.5 °C)  Max: 98.5 °F (36.9 °C)   BP  Min: 81/47  Max: 109/65   Pulse  Min: 85  Max: 96   Resp  Min: 16  Max: 18        General: Well Developed, Well Nourished, not in acute distress   HEENT: normocephalic, atraumatic, conjunctiva non-icteric    Respiratory:  non-labored, symmetrical chest rise   Cardiovascular: regular rate, no JVD  Gastrointestinal: gravid, soft, no TTP, no rebound tenderness or guarding to palpation, no palpable ctx   Neurologic: alert and oriented, CN II-XII grossly intact without focal deficit  Psychiatric: normal mood, pleasant, cooperative  Musculoskeletal: negative calf tenderness, no lower extremity edema  Skin: warm & dry, no cyanosis, no jaundice    Fetal Monitoring  FHT's: Reactive, intermittent decelerations but with return to baseline and with moderate variability and accelerations between decelerations  external monitors used   Contractions: none     Assessment   1. IUP at 28w6d by LMP c/w 7- & 10-week ultrasounds, HEENA 3/6/23  2. PPROM  -s/p BMZ -  -s/p latency antibiotics -   3. Breech presentation   4. Rh negative, s/p RhoGAM 2022    Plan   1.   Continue inpatient care secondary to PPROM  2.  CEFM secondary to intermittent decelerations  3.  Type and screen  every 72 hours  4.  Weekly MFM evaluations and ultrasounds, occurring on   5.  Monitor for signs/symptoms of IAI  6.  Monitor for signs/symptoms of  labor  7.  Monitor for vaginal bleeding  8.  Plan for delivery at 34 weeks gestation via  section secondary to PPROM as well as breech presentation, or clinically sooner if indicated    Follow-up with MFM on Monday, 2022 to confirm patient's HEENA as there is differences in patient's stated gestational age from prior notes. She has passed her 1-hr GTT and received rhogam on 2022    Tom Tena MD  2022  08:19 CST            Electronically signed by Tom Tena MD at 22 0823     Tom Tena  MD RAMY at 22 0929              Tom Tena MD  [facility]  [address]  [place] [address]  Office [phone]  Fax [phone]      [facility]  Cassandra Del Cid  : 1990  MRN: [id number]  CSN: [id number]    Antepartum Progress note    Subjective   She reports is having no problems.  She reports continued leakage of fluid- clear now mixed with mucus and blood.  Fluid over the past 24 hours per patient has been pink intermittently.  Denies bright red blood currently.  Endorses appropriate fetal movement.  Denies contractions, fever, chills, nausea/vomiting.    Objective   Min/max vitals past 24 hours:  Temp  Min: 97.5 °F (36.4 °C)  Max: 98.6 °F (37 °C)   BP  Min: 90/51  Max: 122/71   Pulse  Min: 81  Max: 102   Resp  Min: 16  Max: 19        General: Well Developed, Well Nourished, not in acute distress   HEENT: normocephalic, atraumatic, conjunctiva non-icteric    Respiratory: Non-labored, symmetrical chest rise   Cardiovascular: regular rate, no JVD  Gastrointestinal: gravid, soft, no TTP, no rebound tenderness or guarding to palpation, no palpable ctx   Neurologic: alert and oriented, CN II-XII grossly intact without focal deficit  Psychiatric: normal mood, pleasant, cooperative  Musculoskeletal: negative calf tenderness, no lower extremity edema  Skin: warm & dry, no cyanosis, no jaundice    Fetal Monitoring  FHT's: reactive tracing, intermittent decelerations are noted on review of the tracing overnight and into the past 24 hours however the fetus has returned to baseline with moderate variability following these decelerations  external monitors used   Contractions: none             Result Review   US OB TRANSVAGINAL (2022 10:50 EDT)  IUP consistent with 7 weeks and 3 days, HEENA 3/6/2023    Maternal Fetal Study OB Transvaginal, Nuchal Translucency, OB < 14 Weeks (08/10/2022 10:28 EDT)  Crown-rump length consistent with 2022 ultrasound, HEENA 3/6/2030    Assessment   3. IUP  at 28w5d by LMP consistent with a 7-week and 10-week ultrasound, HEENA 3/6/2023  4.  prelabor rupture of membranes  -Status post latency antibiotics -  -Status post BMZ -   3. Breech presentation    Plan   1.   Continue inpatient care secondary to PPROM  2.  CEFM secondary to intermittent decelerations  3.  Type and screen  every 72 hours  4.  Weekly MFM evaluations and ultrasounds, occurring on   5.  Monitor for signs/symptoms of IAI  6.  Monitor for signs/symptoms of  labor  7.  Monitor for vaginal bleeding  8.  Plan for delivery at 34 weeks gestation via  section secondary to PPROM as well as breech presentation, or clinically sooner if indicated    Discussion: Prior discussion was held concerning patient's due date.  Patient states she was initially told her due date was 2023 however she also was told her due date was 3/6/2023.  Review of her medical records was undertaken and I noted 2 early ultrasounds at 7 weeks and at 10 weeks.  Both of these were consistent with each other.  This puts her due date of 3/6/2023.  Her due date was updated in the computer.  We will need to follow-up with MFM on Monday to confirm the patient's due date as they had been using a different dating criteria in their most recent notes.    Tom Tena MD  2022  09:29 CST            Electronically signed by Tom Tena MD at 22 0941     Ethan Betancourt MD at 22 0844           [facility]     Progress Note    Patient Name: Cassandra Del Cid  : 1990  MRN: [id number]  Primary Care Physician:  Magaly Hatch, MARIAM  Date of admission: 2022    Subjective   Subjective     Chief Complaint: PPROM    History of Present Illness  Patient seen and examined.  Discussed with nursing staff.  Chart reviewed.  Events noted.    She has now completed her betamethasone course.  She has now completed 7 days of antibiotics.  She continues to remain on labor and  delivery with continuous monitoring secondary to nonperiodic decelerations.  Overall, fetal status remains reassuring.    Of note, maternal-fetal medicine is dating her differently.  They are closed today.  This will need to be rectified next week.  There is a 9-day difference between what the hospital chart says and what maternal-fetal medicine is considering.  Although that does not mean much today, it will mean more whenever she delivers as she may, or may not, need neuroprotective magnesium.    She reports active fetal movement.  She has no pain.  She has noted some blood tinge in her fluid.  She denies contractions.    Patient Reports see above    Review of Systems   Genitourinary:        Leakage of fluid   All other systems reviewed and are negative.      Objective   Objective     Vitals:   Temp:  [97.6 °F (36.4 °C)-98.5 °F (36.9 °C)] 97.7 °F (36.5 °C)  Heart Rate:  [78-90] 80  Resp:  [16-17] 17  BP: ()/(55-63) 108/59    Physical Exam  Vitals and nursing note reviewed. Exam conducted with a chaperone present.   Constitutional:       Appearance: Normal appearance. She is normal weight.   HENT:      Head: Normocephalic.   Cardiovascular:      Rate and Rhythm: Normal rate.      Pulses: Normal pulses.   Pulmonary:      Effort: Pulmonary effort is normal.   Abdominal:      General: Abdomen is flat.      Palpations: Abdomen is soft.      Comments: Fundus is appropriate.  It is nontender.   Musculoskeletal:         General: Normal range of motion.   Skin:     General: Skin is warm.   Neurological:      General: No focal deficit present.      Mental Status: She is alert and oriented to person, place, and time. Mental status is at baseline.   Psychiatric:         Mood and Affect: Mood normal.         Behavior: Behavior normal.         Thought Content: Thought content normal.         Judgment: Judgment normal.     Category 2 NST due to 9 periodic decelerations  Variability is reassuring  Accelerations are  present  Tocometer is quiet.     Result Review    Result Review:  I have personally reviewed the results from the time of this admission to 2022 08:44 CST and agree with these findings:  [x]  Laboratory list / accordion  []  Microbiology  [x]  Radiology  []  EKG/Telemetry   []  Cardiology/Vascular   []  Pathology  []  Old records  []  Other:  Most notable findings include: Normal white blood cell count.  Recent ultrasound this week with maternal-fetal medicine showing a BPP of 8 out of 8 and RADHA with a single pocket of 3.8 cm.      Assessment & Plan   Assessment / Plan     Brief Patient Summary:  Cassandra Del Cid is a 32 y.o. female who was admitted as a transfer for  premature rupture membranes.  She is status post betamethasone and a full weeks worth of latency antibiotics.  She remained stable at this time.    Active Hospital Problems:  Active Hospital Problems    Diagnosis    • **Threatened premature labor    •  premature rupture of membranes (PPROM) with unknown onset of labor    • Breech presentation with  problem      Plan:   Continue management on labor delivery for now  Will need to investigate reasons behind the dating discrepancy and choose the one that is best for this patient.  This will certainly, come into play whenever delivery is indicated as neuroprotective mag would be indicated at less than 32 weeks.  Patient is updated.  Questions answered.  She verbalizes understanding.  Of note, patient was noted to continue to be in breech presentation on her ultrasound 2 days ago.  When delivery is indicated, if malpresentation continues, which it likely will,  section would be indicated.    DVT prophylaxis:  Mechanical DVT prophylaxis orders are present.    CODE STATUS:    Level Of Support Discussed With: Patient  Code Status (Patient has no pulse and is not breathing): CPR (Attempt to Resuscitate)  Medical Interventions (Patient has pulse or is breathing): Full  Support  Release to patient: Routine Release    Disposition:  I expect patient to be discharged following delivery.    Ethan Betancourt MD             Electronically signed by Ethan Betancourt MD at 12/16/22 8168

## 2022-12-19 NOTE — PLAN OF CARE
Problem: Adult Inpatient Plan of Care  Goal: Plan of Care Review  Outcome: Ongoing, Progressing  Flowsheets (Taken 12/19/2022 1546)  Progress: improving  Plan of Care Reviewed With: patient  Outcome Evaluation: continue continuous fetal monitoring. Pt reports no leaking of fluid or blood today. Bilateral SCD in place while in bed. Pt to see MFM on wednesday at 0830.  Goal: Patient-Specific Goal (Individualized)  Outcome: Ongoing, Progressing  Goal: Absence of Hospital-Acquired Illness or Injury  Outcome: Ongoing, Progressing  Intervention: Identify and Manage Fall Risk  Recent Flowsheet Documentation  Taken 12/19/2022 0720 by Rosa Sullivan RN  Safety Promotion/Fall Prevention: safety round/check completed  Intervention: Prevent Skin Injury  Recent Flowsheet Documentation  Taken 12/19/2022 0720 by Rosa Sullivan RN  Body Position: side-lying  Intervention: Prevent and Manage VTE (Venous Thromboembolism) Risk  Recent Flowsheet Documentation  Taken 12/19/2022 0720 by Rosa Sullivan RN  VTE Prevention/Management:   bilateral   sequential compression devices on  Goal: Optimal Comfort and Wellbeing  Outcome: Ongoing, Progressing  Intervention: Provide Person-Centered Care  Recent Flowsheet Documentation  Taken 12/19/2022 0720 by Rosa Sullivan RN  Trust Relationship/Rapport: care explained  Goal: Readiness for Transition of Care  Outcome: Ongoing, Progressing   Goal Outcome Evaluation:  Plan of Care Reviewed With: patient        Progress: improving  Outcome Evaluation: continue continuous fetal monitoring. Pt reports no leaking of fluid or blood today. Bilateral SCD in place while in bed. Pt to see MFM on wednesday at 0830.

## 2022-12-19 NOTE — PROGRESS NOTES
[facility]  Cassandra Del Cid  : 1990  MRN: [id number]  CSN: [id number]    Antepartum Progress Note    Subjective   Cassandra Del Cid is a 32 y.o. year old  with an Estimated Date of Delivery: 3/6/23 currently at 29w0d who initially presented with leaking fluid.  The patient currently reports leaking fluid. She denies bleeding at this time.     Prenatal care has been with Janet.  It has been complicated by PPROM.       Objective     Min/max vitals past 24 hours:   Temp  Min: 97.8 °F (36.6 °C)  Max: 98.5 °F (36.9 °C)  BP  Min: 93/57  Max: 105/58  Pulse  Min: 78  Max: 95  Resp  Min: 16  Max: 18         General: well developed; well nourished  no acute distress   Heart: Not performed.   Lungs: breathing is unlabored   Abdomen: soft, non-tender; no masses  no umbilical or inguinal hernias are present  no hepato-splenomegaly   FHT's: 140 bpm, mod delonte   Cervix: was not checked.   Contractions: occasional               Assessment   1. IUP at 29w0d  2. GBS unknown   3. PPROM  4. History of previous  section   5. RH negative      Plan   1. Type and Screen done today   2. S/p antibiotics   3. Continue to monitor for signs of infection     June Roe DO  2022  08:12 CST

## 2022-12-20 ENCOUNTER — ANESTHESIA EVENT (OUTPATIENT)
Dept: LABOR AND DELIVERY | Facility: HOSPITAL | Age: 32
End: 2022-12-20
Payer: COMMERCIAL

## 2022-12-20 ENCOUNTER — ANESTHESIA (OUTPATIENT)
Dept: LABOR AND DELIVERY | Facility: HOSPITAL | Age: 32
End: 2022-12-20
Payer: COMMERCIAL

## 2022-12-20 PROBLEM — O45.93 PLACENTAL ABRUPTION, THIRD TRIMESTER: Status: ACTIVE | Noted: 2022-12-20

## 2022-12-20 LAB
DEPRECATED RDW RBC AUTO: 49.3 FL (ref 37–54)
ERYTHROCYTE [DISTWIDTH] IN BLOOD BY AUTOMATED COUNT: 13.9 % (ref 12.3–15.4)
HCT VFR BLD AUTO: 34.3 % (ref 34–46.6)
HGB BLD-MCNC: 11 G/DL (ref 12–15.9)
MCH RBC QN AUTO: 31.6 PG (ref 26.6–33)
MCHC RBC AUTO-ENTMCNC: 32.1 G/DL (ref 31.5–35.7)
MCV RBC AUTO: 98.6 FL (ref 79–97)
PLATELET # BLD AUTO: 223 10*3/MM3 (ref 140–450)
PMV BLD AUTO: 9.8 FL (ref 6–12)
RBC # BLD AUTO: 3.48 10*6/MM3 (ref 3.77–5.28)
WBC NRBC COR # BLD: 9.19 10*3/MM3 (ref 3.4–10.8)

## 2022-12-20 PROCEDURE — 25010000002 METOCLOPRAMIDE PER 10 MG: Performed by: NURSE ANESTHETIST, CERTIFIED REGISTERED

## 2022-12-20 PROCEDURE — 25010000002 CEFAZOLIN PER 500 MG: Performed by: OBSTETRICS & GYNECOLOGY

## 2022-12-20 PROCEDURE — 51702 INSERT TEMP BLADDER CATH: CPT

## 2022-12-20 PROCEDURE — 85027 COMPLETE CBC AUTOMATED: CPT | Performed by: OBSTETRICS & GYNECOLOGY

## 2022-12-20 PROCEDURE — 25010000002 ONDANSETRON PER 1 MG: Performed by: OBSTETRICS & GYNECOLOGY

## 2022-12-20 PROCEDURE — 25010000002 ONDANSETRON PER 1 MG: Performed by: NURSE ANESTHETIST, CERTIFIED REGISTERED

## 2022-12-20 PROCEDURE — 25010000002 HYDROMORPHONE 1 MG/ML SOLUTION: Performed by: NURSE ANESTHETIST, CERTIFIED REGISTERED

## 2022-12-20 PROCEDURE — 0 MAGNESIUM SULFATE 20 GM/500ML SOLUTION: Performed by: OBSTETRICS & GYNECOLOGY

## 2022-12-20 PROCEDURE — 59515 CESAREAN DELIVERY: CPT | Performed by: OBSTETRICS & GYNECOLOGY

## 2022-12-20 PROCEDURE — 25010000002 KETOROLAC TROMETHAMINE PER 15 MG: Performed by: OBSTETRICS & GYNECOLOGY

## 2022-12-20 PROCEDURE — 25010000002 OXYTOCIN PER 10 UNITS: Performed by: NURSE ANESTHETIST, CERTIFIED REGISTERED

## 2022-12-20 PROCEDURE — 88307 TISSUE EXAM BY PATHOLOGIST: CPT | Performed by: OBSTETRICS & GYNECOLOGY

## 2022-12-20 DEVICE — SEAL HEMO SURG ARISTA/AH ABS/PWDR 3GM: Type: IMPLANTABLE DEVICE | Site: ABDOMEN | Status: FUNCTIONAL

## 2022-12-20 RX ORDER — ONDANSETRON 4 MG/1
4 TABLET, FILM COATED ORAL EVERY 8 HOURS PRN
Status: DISCONTINUED | OUTPATIENT
Start: 2022-12-20 | End: 2022-12-22 | Stop reason: HOSPADM

## 2022-12-20 RX ORDER — OXYTOCIN/0.9 % SODIUM CHLORIDE 30/500 ML
999 PLASTIC BAG, INJECTION (ML) INTRAVENOUS ONCE
Status: DISCONTINUED | OUTPATIENT
Start: 2022-12-20 | End: 2022-12-22 | Stop reason: HOSPADM

## 2022-12-20 RX ORDER — METOCLOPRAMIDE HYDROCHLORIDE 5 MG/ML
10 INJECTION INTRAMUSCULAR; INTRAVENOUS ONCE AS NEEDED
Status: DISCONTINUED | OUTPATIENT
Start: 2022-12-20 | End: 2022-12-20 | Stop reason: HOSPADM

## 2022-12-20 RX ORDER — BUPIVACAINE HCL/0.9 % NACL/PF 0.1 %
2 PLASTIC BAG, INJECTION (ML) EPIDURAL ONCE
Status: COMPLETED | OUTPATIENT
Start: 2022-12-20 | End: 2022-12-20

## 2022-12-20 RX ORDER — SODIUM CHLORIDE, SODIUM LACTATE, POTASSIUM CHLORIDE, CALCIUM CHLORIDE 600; 310; 30; 20 MG/100ML; MG/100ML; MG/100ML; MG/100ML
125 INJECTION, SOLUTION INTRAVENOUS CONTINUOUS
Status: DISCONTINUED | OUTPATIENT
Start: 2022-12-20 | End: 2022-12-20

## 2022-12-20 RX ORDER — PROMETHAZINE HYDROCHLORIDE 25 MG/1
12.5 TABLET ORAL EVERY 4 HOURS PRN
Status: DISCONTINUED | OUTPATIENT
Start: 2022-12-20 | End: 2022-12-22 | Stop reason: HOSPADM

## 2022-12-20 RX ORDER — KETOROLAC TROMETHAMINE 30 MG/ML
30 INJECTION, SOLUTION INTRAMUSCULAR; INTRAVENOUS ONCE
Status: COMPLETED | OUTPATIENT
Start: 2022-12-20 | End: 2022-12-20

## 2022-12-20 RX ORDER — EPHEDRINE SULFATE 50 MG/ML
INJECTION, SOLUTION INTRAVENOUS AS NEEDED
Status: DISCONTINUED | OUTPATIENT
Start: 2022-12-20 | End: 2022-12-20 | Stop reason: SURG

## 2022-12-20 RX ORDER — DOCUSATE SODIUM 100 MG/1
100 CAPSULE, LIQUID FILLED ORAL 2 TIMES DAILY PRN
Status: DISCONTINUED | OUTPATIENT
Start: 2022-12-20 | End: 2022-12-22 | Stop reason: HOSPADM

## 2022-12-20 RX ORDER — KETOROLAC TROMETHAMINE 15 MG/ML
15 INJECTION, SOLUTION INTRAMUSCULAR; INTRAVENOUS EVERY 6 HOURS
Status: COMPLETED | OUTPATIENT
Start: 2022-12-20 | End: 2022-12-21

## 2022-12-20 RX ORDER — HYDROMORPHONE HYDROCHLORIDE 1 MG/ML
0.5 INJECTION, SOLUTION INTRAMUSCULAR; INTRAVENOUS; SUBCUTANEOUS
Status: DISCONTINUED | OUTPATIENT
Start: 2022-12-20 | End: 2022-12-22 | Stop reason: HOSPADM

## 2022-12-20 RX ORDER — PRENATAL VIT/IRON FUM/FOLIC AC 27MG-0.8MG
1 TABLET ORAL DAILY
Status: DISCONTINUED | OUTPATIENT
Start: 2022-12-20 | End: 2022-12-22 | Stop reason: HOSPADM

## 2022-12-20 RX ORDER — BUTORPHANOL TARTRATE 1 MG/ML
0.5 INJECTION, SOLUTION INTRAMUSCULAR; INTRAVENOUS EVERY 6 HOURS PRN
Status: DISCONTINUED | OUTPATIENT
Start: 2022-12-20 | End: 2022-12-22 | Stop reason: HOSPADM

## 2022-12-20 RX ORDER — OXYTOCIN/0.9 % SODIUM CHLORIDE 30/500 ML
250 PLASTIC BAG, INJECTION (ML) INTRAVENOUS CONTINUOUS
Status: ACTIVE | OUTPATIENT
Start: 2022-12-20 | End: 2022-12-20

## 2022-12-20 RX ORDER — IBUPROFEN 600 MG/1
600 TABLET ORAL EVERY 6 HOURS
Status: DISCONTINUED | OUTPATIENT
Start: 2022-12-21 | End: 2022-12-22 | Stop reason: HOSPADM

## 2022-12-20 RX ORDER — BUPIVACAINE HYDROCHLORIDE 7.5 MG/ML
INJECTION, SOLUTION EPIDURAL; RETROBULBAR AS NEEDED
Status: DISCONTINUED | OUTPATIENT
Start: 2022-12-20 | End: 2022-12-20 | Stop reason: SURG

## 2022-12-20 RX ORDER — CALCIUM GLUCONATE 94 MG/ML
1 INJECTION, SOLUTION INTRAVENOUS ONCE AS NEEDED
Status: DISCONTINUED | OUTPATIENT
Start: 2022-12-20 | End: 2022-12-20

## 2022-12-20 RX ORDER — ONDANSETRON 2 MG/ML
4 INJECTION INTRAMUSCULAR; INTRAVENOUS ONCE AS NEEDED
Status: DISCONTINUED | OUTPATIENT
Start: 2022-12-20 | End: 2022-12-20 | Stop reason: HOSPADM

## 2022-12-20 RX ORDER — METHYLERGONOVINE MALEATE 0.2 MG/ML
200 INJECTION INTRAVENOUS ONCE AS NEEDED
Status: DISCONTINUED | OUTPATIENT
Start: 2022-12-20 | End: 2022-12-20 | Stop reason: HOSPADM

## 2022-12-20 RX ORDER — METOCLOPRAMIDE HYDROCHLORIDE 5 MG/ML
10 INJECTION INTRAMUSCULAR; INTRAVENOUS ONCE
Status: COMPLETED | OUTPATIENT
Start: 2022-12-20 | End: 2022-12-20

## 2022-12-20 RX ORDER — ACETAMINOPHEN 325 MG/1
650 TABLET ORAL EVERY 6 HOURS
Status: DISCONTINUED | OUTPATIENT
Start: 2022-12-21 | End: 2022-12-22 | Stop reason: HOSPADM

## 2022-12-20 RX ORDER — ACETAMINOPHEN 500 MG
1000 TABLET ORAL EVERY 6 HOURS
Status: DISPENSED | OUTPATIENT
Start: 2022-12-20 | End: 2022-12-21

## 2022-12-20 RX ORDER — DROPERIDOL 2.5 MG/ML
0.62 INJECTION, SOLUTION INTRAMUSCULAR; INTRAVENOUS ONCE
Status: DISCONTINUED | OUTPATIENT
Start: 2022-12-20 | End: 2022-12-22 | Stop reason: HOSPADM

## 2022-12-20 RX ORDER — CARBOPROST TROMETHAMINE 250 UG/ML
250 INJECTION, SOLUTION INTRAMUSCULAR
Status: DISCONTINUED | OUTPATIENT
Start: 2022-12-20 | End: 2022-12-20 | Stop reason: HOSPADM

## 2022-12-20 RX ORDER — MAGNESIUM SULFATE HEPTAHYDRATE 40 MG/ML
2 INJECTION, SOLUTION INTRAVENOUS CONTINUOUS
Status: DISCONTINUED | OUTPATIENT
Start: 2022-12-20 | End: 2022-12-20

## 2022-12-20 RX ORDER — OXYCODONE HYDROCHLORIDE 5 MG/1
5 TABLET ORAL EVERY 4 HOURS PRN
Status: DISCONTINUED | OUTPATIENT
Start: 2022-12-20 | End: 2022-12-22 | Stop reason: HOSPADM

## 2022-12-20 RX ORDER — MISOPROSTOL 200 UG/1
800 TABLET ORAL ONCE AS NEEDED
Status: DISCONTINUED | OUTPATIENT
Start: 2022-12-20 | End: 2022-12-20 | Stop reason: HOSPADM

## 2022-12-20 RX ORDER — OXYTOCIN 10 [USP'U]/ML
INJECTION, SOLUTION INTRAMUSCULAR; INTRAVENOUS AS NEEDED
Status: DISCONTINUED | OUTPATIENT
Start: 2022-12-20 | End: 2022-12-20 | Stop reason: SURG

## 2022-12-20 RX ORDER — TRISODIUM CITRATE DIHYDRATE AND CITRIC ACID MONOHYDRATE 500; 334 MG/5ML; MG/5ML
15 SOLUTION ORAL ONCE
Status: COMPLETED | OUTPATIENT
Start: 2022-12-20 | End: 2022-12-20

## 2022-12-20 RX ORDER — NALOXONE HCL 0.4 MG/ML
0.4 VIAL (ML) INJECTION
Status: ACTIVE | OUTPATIENT
Start: 2022-12-20 | End: 2022-12-21

## 2022-12-20 RX ORDER — ONDANSETRON 2 MG/ML
4 INJECTION INTRAMUSCULAR; INTRAVENOUS EVERY 6 HOURS PRN
Status: DISCONTINUED | OUTPATIENT
Start: 2022-12-20 | End: 2022-12-22 | Stop reason: HOSPADM

## 2022-12-20 RX ORDER — NALOXONE HCL 0.4 MG/ML
0.1 VIAL (ML) INJECTION
Status: DISCONTINUED | OUTPATIENT
Start: 2022-12-20 | End: 2022-12-22 | Stop reason: HOSPADM

## 2022-12-20 RX ORDER — SIMETHICONE 80 MG
80 TABLET,CHEWABLE ORAL 4 TIMES DAILY PRN
Status: DISCONTINUED | OUTPATIENT
Start: 2022-12-20 | End: 2022-12-22 | Stop reason: HOSPADM

## 2022-12-20 RX ORDER — MAGNESIUM SULFATE HEPTAHYDRATE 40 MG/ML
4 INJECTION, SOLUTION INTRAVENOUS ONCE
Status: COMPLETED | OUTPATIENT
Start: 2022-12-20 | End: 2022-12-20

## 2022-12-20 RX ORDER — OXYCODONE HYDROCHLORIDE 5 MG/1
10 TABLET ORAL EVERY 4 HOURS PRN
Status: DISCONTINUED | OUTPATIENT
Start: 2022-12-20 | End: 2022-12-22 | Stop reason: HOSPADM

## 2022-12-20 RX ORDER — FAMOTIDINE 10 MG/ML
20 INJECTION, SOLUTION INTRAVENOUS ONCE AS NEEDED
Status: COMPLETED | OUTPATIENT
Start: 2022-12-20 | End: 2022-12-20

## 2022-12-20 RX ORDER — NALBUPHINE HCL 10 MG/ML
2.5 AMPUL (ML) INJECTION EVERY 4 HOURS PRN
Status: ACTIVE | OUTPATIENT
Start: 2022-12-20 | End: 2022-12-21

## 2022-12-20 RX ADMIN — ONDANSETRON 12 MG: 2 INJECTION INTRAMUSCULAR; INTRAVENOUS at 09:17

## 2022-12-20 RX ADMIN — SODIUM CHLORIDE, POTASSIUM CHLORIDE, SODIUM LACTATE AND CALCIUM CHLORIDE 75 ML/HR: 600; 310; 30; 20 INJECTION, SOLUTION INTRAVENOUS at 07:10

## 2022-12-20 RX ADMIN — HYDROMORPHONE HYDROCHLORIDE 900 MCG: 1 INJECTION, SOLUTION INTRAMUSCULAR; INTRAVENOUS; SUBCUTANEOUS at 09:44

## 2022-12-20 RX ADMIN — Medication 2 G: at 09:16

## 2022-12-20 RX ADMIN — SODIUM CHLORIDE, POTASSIUM CHLORIDE, SODIUM LACTATE AND CALCIUM CHLORIDE: 600; 310; 30; 20 INJECTION, SOLUTION INTRAVENOUS at 09:16

## 2022-12-20 RX ADMIN — HYDROMORPHONE HYDROCHLORIDE 100 MCG: 1 INJECTION, SOLUTION INTRAMUSCULAR; INTRAVENOUS; SUBCUTANEOUS at 09:20

## 2022-12-20 RX ADMIN — EPHEDRINE SULFATE 10 MG: 50 INJECTION INTRAVENOUS at 09:47

## 2022-12-20 RX ADMIN — BUPIVACAINE HYDROCHLORIDE 1.4 ML: 7.5 INJECTION, SOLUTION EPIDURAL; RETROBULBAR at 09:20

## 2022-12-20 RX ADMIN — SODIUM CHLORIDE, POTASSIUM CHLORIDE, SODIUM LACTATE AND CALCIUM CHLORIDE 1000 ML: 600; 310; 30; 20 INJECTION, SOLUTION INTRAVENOUS at 01:00

## 2022-12-20 RX ADMIN — FAMOTIDINE 20 MG: 10 INJECTION INTRAVENOUS at 08:56

## 2022-12-20 RX ADMIN — SODIUM CITRATE AND CITRIC ACID MONOHYDRATE 15 ML: 500; 334 SOLUTION ORAL at 07:20

## 2022-12-20 RX ADMIN — EPHEDRINE SULFATE 10 MG: 50 INJECTION INTRAVENOUS at 09:27

## 2022-12-20 RX ADMIN — MAGNESIUM SULFATE HEPTAHYDRATE 4 G: 40 INJECTION, SOLUTION INTRAVENOUS at 07:11

## 2022-12-20 RX ADMIN — ONDANSETRON 4 MG: 2 INJECTION INTRAMUSCULAR; INTRAVENOUS at 17:37

## 2022-12-20 RX ADMIN — KETOROLAC TROMETHAMINE 15 MG: 15 INJECTION, SOLUTION INTRAMUSCULAR; INTRAVENOUS at 18:14

## 2022-12-20 RX ADMIN — ACETAMINOPHEN 1000 MG: 500 TABLET ORAL at 15:07

## 2022-12-20 RX ADMIN — METOCLOPRAMIDE 10 MG: 5 INJECTION, SOLUTION INTRAMUSCULAR; INTRAVENOUS at 08:54

## 2022-12-20 RX ADMIN — KETOROLAC TROMETHAMINE 30 MG: 30 INJECTION, SOLUTION INTRAMUSCULAR; INTRAVENOUS at 12:28

## 2022-12-20 RX ADMIN — OXYTOCIN-SODIUM CHLORIDE 0.9% IV SOLN 30 UNIT/500ML 250 ML/HR: 30-0.9/5 SOLUTION at 12:25

## 2022-12-20 RX ADMIN — OXYTOCIN 30 UNITS: 10 INJECTION, SOLUTION INTRAMUSCULAR; INTRAVENOUS at 09:38

## 2022-12-20 RX ADMIN — ACETAMINOPHEN 1000 MG: 500 TABLET ORAL at 22:17

## 2022-12-20 NOTE — OP NOTE
[facility]  Cassandra Del Cid  : 1990  MRN: [id number]  [facility]: [id number]  Date: 2022    Operative Note     SECTION PRIMARY      Pre-op Diagnosis:  Clinical abruption   PPROM    Post-op Diagnosis:  Clinical abruption  PPROM   Procedure: Procedure(s):   SECTION PRIMARY   Surgeon: Surgeon(s):  June Roe DO       Anesthesia: Choice by NATE Hopkins CRNA     Estimated Blood Loss: 700   mls   Fluids: 1700   mls   UOP: 800   mls   Drains: Clemente catheter   ABx: Ancef     Specimens:  Placenta and cord   Findings: Normal uterus tubes and ovaries, approximately 100 and clots noted as soon as I entered the lower uterine segment   Complications: none      INDICATION: Cassandra Del Cid is a 32 y.o. female  3 para 2 at 29 weeks and 1 day initially admitted for  rupture of membranes.  She received latency antibiotics as well as betamethasone when she was initially admitted.  Last week she was noted to have an episode of bleeding which since resolved.  This morning starting around 3 AM she was noted to have bleeding again.  She continued to have a slow trickle of blood was noted to be john.  Clinically she had an abruption.  Risk benefits to primary  section were discussed and patient elected to proceed.     PROCEDURE: After informed consent was obtained, the patient was taken to the operating room where spinal anesthesia was administered. Time out procedure was completed and perioperative antibiotics were administered. She was placed in the supine position with leftward tilt and her abdomen was prepped and draped in normal sterile fashion after a Clemente catheter was placed by nursing staff.   After confirming adequate anesthesia, a Pfannenstiel incision was made with a scalpel 2 fingerbreadths above the pubic symphysis.  This was carried down sharply to the underlying fascia which was incised in the midline.  The fascial incision was extended laterally with Craig  scissors.  The fascial edges were then elevated and the underlying rectus muscles dissected off with sharp technique.  The rectus muscles were  in the midline and the underlying peritoneum identified and entered bluntly.  The peritoneal incision was then extended and the Sanchez O retractor inserted.  The vesicouterine peritoneum was sharply incised with Metzenbaum scissors to create a bladder flap.  A low transverse uterine incision was made with a clean scalpel.  The uterine incision was bluntly extended and amniotomy was performed returning clear fluid.  The infant was delivered through the incision without difficulty from the jef breech position. The remainder of the infant was delivered without difficulty.  The infant was vigorous on the field, the cord was clamped and cut, and the infant handed off to waiting NICU team after delay cord clamping.  Cord blood and gases were obtained and the placenta then manually removed.  The uterus was exteriorized and cleared of clot and debris with a moist laparotomy sponge.  The uterine incision was closed with a running locked suture of 0 Monocryl.  A second imbricating layer of 0 Monocryl was placed for reinforcement.  The uterine incision was hemostatic.  The uterus was gently returned to the abdomen. The Sanchez-0 was then removed.  The uterine incision was reinspected with hemostasis noted.  Surgicell was applied. The parietal peritoneum was then closed with a running suture of 2-0 Vicryl and the rectus muscles were reapproximated.  The subfascial spaces and rectus muscles were inspected with hemostasis noted.  The fascia was then closed with a running suture of 0 Vicryl.  The subcutaneous tissues were irrigated and made hemostatic with Bovie cautery.  The subcutaneous tissues were reapproximated with interrupted sutures of 2-0 plain gut.  The skin was closed with a subcuticular stitch of 4-0 Monocryl and reinforced with Steri-Strips.  A sterile pressure  dressing was then applied.    After expressing the uterus the patient was transitioned to the stretcher and taken to the recovery room in stable condition.  She tolerated the procedure well without complications.  All sponge, needle, and instrument counts were correct ×3 per the OR staff.    June Roe DO   12/20/2022  10:24 CST

## 2022-12-20 NOTE — ANESTHESIA PREPROCEDURE EVALUATION
Anesthesia Evaluation     Patient summary reviewed and Nursing notes reviewed   NPO Solid Status: > 8 hours  NPO Liquid Status: > 8 hours           Airway   Mallampati: I  TM distance: >3 FB  Neck ROM: full  Dental - normal exam     Pulmonary - negative pulmonary ROS and normal exam   Cardiovascular - negative cardio ROS and normal exam        Neuro/Psych- negative ROS  GI/Hepatic/Renal/Endo - negative ROS     Musculoskeletal (-) negative ROS    Abdominal  - normal exam   Substance History - negative use     OB/GYN negative ob/gyn ROS         Other - negative ROS                       Anesthesia Plan    ASA 2 - emergent     spinal and ITN       Anesthetic plan, risks, benefits, and alternatives have been provided, discussed and informed consent has been obtained with: patient and spouse/significant other.        CODE STATUS:    Level Of Support Discussed With: Patient  Code Status (Patient has no pulse and is not breathing): CPR (Attempt to Resuscitate)  Medical Interventions (Patient has pulse or is breathing): Full Support  Release to patient: Routine Release

## 2022-12-20 NOTE — PROGRESS NOTES
Patient seen and evaluated at bedside.  Overnight patient started to have bleeding around 3 AM.  Patient has continued approximately 200-300 blood noted.  She is also had some irregular contractions.  At this time I am concerned for abruption.  Discussed with patient need to proceed with primary  section.  Discussed with patient risk benefits.  Risk including bleeding, infection as well as damage to underlying organs and viscera were discussed.  At this time.  Patient came in to needed emergency surgery.  I then went back into the patient's room and performed a speculum exam.  25 to 50 cc of blood were noted.  Her cervix was noted to be 1 cm.  We will continue to monitor patient for signs of bleeding and plan for  section once room is available.

## 2022-12-20 NOTE — PLAN OF CARE
Goal Outcome Evaluation:  Plan of Care Reviewed With: patient, spouse        Progress: improving  Outcome Evaluation: PPROM 2/7/22 at 0200; started bleeding again this AM; Primary c/s at 0936; Girl- NICU; 10-20% placental abruption; FF/ML/UU light rubra; planning to breastfeed; rating pain a 0- but reports pressure during fundal exams

## 2022-12-20 NOTE — LACTATION NOTE
Mother's Name: Cassandra  Infant's Name: Yoana Ramos  Day:0  Dx:  delivery  Birth Gestation:29w1d  Adjusted Gestation:29w1d  Birth weight: 3-1.4 (1400g)  Last weight:              % of weight loss:    Feeding Orders:NPO  Maternal Hx:,  labor  Prenatal Medications: PNV  Pump available: RX faxed to [facility]  Pumping history in the last 24 hours:     Initial education completed NICU folder given and reviewed with both parents. Discussed milk supply/demand, presence/benefits of colostrum, use of pump and care of pump parts, sterile bottles and steam disinfectant bags. With mother's permission, hand expression initiated/performed at 4 hours s/p delivery. Mother expresses colostrum easily, collected 1 ml through hand expression. Hospital grade pump set up to bedside. Assisted with initiation of pumping, collected additional 6 ml. Milk transported to nicu, labeled and placed in freezer, RN notified. Questions denied by parents at this time. Encouragement and support provided.

## 2022-12-20 NOTE — PAYOR COMM NOTE
BIRTH INFO    [id number]   [id number]    Cassandra Del Cid (32 y.o. Female)     Date of Birth   1990    Social Security Number       Address   [address] [place] [address]    Home Phone   [phone]    MRN   [id number]       [facility]    Marital Status                               Admission Date   12/7/22    Admission Type   Elective    Admitting Provider   Olive Romero MD    Attending Provider   Olive Romero MD    Department, Room/Bed   [facility] LABOR DELIVERY, L03/1       Discharge Date       Discharge Disposition       Discharge Destination                               Attending Provider: Olive Romero MD    Allergies: Penicillins    Isolation: None   Infection: None   Code Status: CPR    Ht: 162.6 cm (64\")   Wt: --    Admission Cmt: None   Principal Problem: Threatened premature labor [O47.00]                 Active Insurance as of 12/7/2022     Primary Coverage     Payor Plan Insurance Group Employer/Plan Group    [employer] BLUE CROSS ANTHEM BLUE CROSS BLUE SHIELD PPO [id number]     Payor Plan Address Payor Plan Phone Number Payor Plan Fax Number Effective Dates    [address] [phone]  7/1/2022 - None Entered    [facility] [id number]       Subscriber Name Subscriber Birth Date Member ID       EUGENIA DEL CID 8/6/1994 [id number]                 Emergency Contacts      (Rel.) Home Phone Work Phone Mobile Phone    Eugenia Del Cid (Spouse) [phone] -- --              Current Facility-Administered Medications   Medication Dose Route Frequency Provider Last Rate Last Admin   • calcium gluconate injection 1 g  1 g Intravenous Once PRN June Roe, DO       • carboprost (HEMABATE) injection 250 mcg  250 mcg Intramuscular Q15 Min PRN June Roe, DO       • ketorolac (TORADOL) injection 30 mg  30 mg Intravenous Once June Roe, DO       • lactated ringers bolus 1,000 mL  1,000 mL Intravenous Once Bhupinder  DO June   Held at 12/20/22 1010   • lactated ringers infusion  50 mL/hr Intravenous Q6H PRN Francisco Olson  mL/hr at 12/20/22 0856 New Bag at 12/20/22 0916   • lactated ringers infusion  125 mL/hr Intravenous Continuous June Roe DO   Held at 12/20/22 1010   • lidocaine PF 1% (XYLOCAINE) injection 5 mL  5 mL Intradermal PRN Olive Romero MD       • magnesium sulfate 20 GM/500ML infusion  2 g/hr Intravenous Continuous June Roe DO   Stopped at 12/20/22 0938   • methylergonovine (METHERGINE) injection 200 mcg  200 mcg Intramuscular Once PRN June Roe DO       • metoclopramide (REGLAN) injection 10 mg  10 mg Intravenous Once PRN June Roe DO       • miSOPROStol (CYTOTEC) tablet 800 mcg  800 mcg Rectal Once PRN June Roe DO       • ondansetron (ZOFRAN) tablet 4 mg  4 mg Oral Q8H PRN Oilve Romero MD        Or   • ondansetron (ZOFRAN) injection 4 mg  4 mg Intravenous Q8H PRN Olive Romero MD   12 mg at 12/20/22 0917   • ondansetron (ZOFRAN) injection 4 mg  4 mg Intravenous Once PRN Juan Miguel Hopkins CRNA       • oxytocin (PITOCIN) 30 units in 0.9% sodium chloride 500 mL (premix)  999 mL/hr Intravenous Once June Roe DO        Followed by   • oxytocin (PITOCIN) 30 units in 0.9% sodium chloride 500 mL (premix)  250 mL/hr Intravenous Continuous June Roe DO       • sodium chloride 0.9 % flush 10 mL  10 mL Intravenous Q12H Olive Romero MD   10 mL at 12/19/22 0910   • sodium chloride 0.9 % flush 10 mL  10 mL Intravenous PRN Olive Romero MD         Orders (last 24 hrs)      Start     Ordered    12/20/22 1115  oxytocin (PITOCIN) 30 units in 0.9% sodium chloride 500 mL (premix)  Continuous        See Hyperspace for full Linked Orders Report.    12/20/22 1010    12/20/22 1100  ketorolac (TORADOL) injection 30 mg  Once         12/20/22 1010    12/20/22 1044  Tissue Pathology Exam  RELEASE UPON ORDERING         Comments: Specimen A: Gestational Age: 29/1 Primary  delivery for breech presentation; PPROM 22 @0200                  ; 10-20% abruption     APGAR:  1 Minute: infant in NICU- unknown apgar at this time   5 Minute:     Placenta  Delivered: 2022  9:39 AM  Appearance: Abnormal  Removal: Manual removal    Disposition: lab   pathology     Abnormalities to Placenta:  Clots noted     Fluid Appearance:  Clear    Delivery Complications: Abruptio Placenta 10-20% abruption     Maternal Medical History Current Pregnancy:  None    Is Patient Diabetic:  No    Infections During Pregnancy:  No                  22 1044    22 1033  Place Sequential Compression Device  Once         22 1033    22 1033  Maintain Sequential Compression Device  Continuous         22 1033    22 1011  Nurse may remove epidural catheter after delivery.  Continuous         22 1010    22 1011  Transfer to Postpartum When Criteria Met  Continuous         22 1010    22 1011  Oxygen Therapy- Nasal Cannula; 2 LPM; Titrate for SPO2: equal to or greater than, per policy  Continuous         22 1010    22 1011  Respirations  Continuous        Comments: If respiratory rate is less than 10/min, notify the Anesthesiologist    22 1010    22 1011  If respiratory is less than 8/min, see Narcan order below. Notify Anesthesiologist STAT.  Continuous         22 1010    22 1011  Blood Pressure and Pulse every 4 hours  Continuous         22 1010    22 1011  Activity and removal of zepeda catheter, per Obstetrician, on routine post-operative orders  Continuous         22 1010    22 1011  Notify Anesthesiologist for any questions/problems  Continuous         22 1010    22 1011  Notify Provider (Specified)  Until Discontinued         22 1010    22 1011  Vital Signs Per Hospital Policy  Per Hospital Policy          12/20/22 1010    12/20/22 1011  Strict Bed Rest  Until Discontinued         12/20/22 1010    12/20/22 1011  Fundal & Lochia Check  Per Order Details        Comments: Every 15 Minutes x4, Then Every 30 Minutes x2, Then Every Shift    12/20/22 1010    12/20/22 1011  Fundal & Lochia Check  Every Shift       12/20/22 1010    12/20/22 1011  Diet: Regular/House Diet; Texture: Regular Texture (IDDSI 7); Fluid Consistency: Thin (IDDSI 0)  Diet Effective Now         12/20/22 1010    12/20/22 1010  oxytocin (PITOCIN) 30 units in 0.9% sodium chloride 500 mL (premix)  Once        See Hyperspace for full Linked Orders Report.    12/20/22 1010    12/20/22 1010  methylergonovine (METHERGINE) injection 200 mcg  Once As Needed         12/20/22 1010    12/20/22 1010  carboprost (HEMABATE) injection 250 mcg  Every 15 Minutes PRN         12/20/22 1010    12/20/22 1010  miSOPROStol (CYTOTEC) tablet 800 mcg  Once As Needed         12/20/22 1010    12/20/22 0815  lactated ringers bolus 1,000 mL  Once         12/20/22 0719    12/20/22 0815  lactated ringers infusion  Continuous         12/20/22 0719    12/20/22 0815  ceFAZolin in 0.9% normal saline (ANCEF) IVPB solution 2 g  Once         12/20/22 0719    12/20/22 0745  magnesium sulfate 20 GM/500ML infusion  Continuous        See Hyperspace for full Linked Orders Report.    12/20/22 0645    12/20/22 0745  metoclopramide (REGLAN) injection 10 mg  Once         12/20/22 0659    12/20/22 0745  Sod Citrate-Citric Acid (BICITRA) solution 15 mL  Once         12/20/22 0659    12/20/22 0730  magnesium sulfate 20 GM/500ML infusion 4 g  Once        See Hyperspace for full Linked Orders Report.    12/20/22 0645    12/20/22 0718  metoclopramide (REGLAN) injection 10 mg  Once As Needed         12/20/22 0719    12/20/22 0718  Obtain Informed Consent  Once         12/20/22 0719    12/20/22 0718  Vital Signs Per Hospital Policy  Per Hospital Policy         12/20/22 0719    12/20/22 0718  Continuous Fetal  Monitoring With NST on Admission and Prior to Initiation of Oxytocin.  Per Order Details        Comments: Continuous Fetal Monitoring With NST on Admission & Prior to Initiation of Oxytocin.    22  External Uterine Contraction Monitoring  Per Hospital Policy         22  Notify Provider (Specified)  Until Discontinued         22  Notify Provider of Tachysystole (Per Hospital Algorithm)  Until Discontinued         22  Notify Provider if Membranes Ruptured, Bleeding Greater Than 1 Pad Per Hour, Fetal Heart Tone Abnormality or Severe Pain  Until Discontinued         22  Initiate Group Beta Strep (GBS) Prophylaxis Protocol, If Criteria Met  Continuous        Comments: NO TREATMENT RECOMMENDED IF: 1) Maternal GBS Status Known Negative 2) Scheduled  Birth With Intact Membranes, Not in Labor 3) Maternal GBS Status Unknown, No Risk Factors  TREAT WITH ANTIBIOTICS IF:  1) Maternal GBS Status Known Positive 2) Maternal GBS Status Unknown With Risk Factors: a)  Previous Infant Affected By GBS Infection b) GBS Urinary Tract Infection (UTI) or Bacteriuria During Pregnancy c) Unexplained Maternal Fever (100.4F (38C) or Greater) During Labor d)  Prolonged Rupture of Membranes (18 or More Hours) e) Gestational Age Less Than 37 Weeks    22  Insert Indwelling Urinary Catheter  Once        See Hyperspace for full Linked Orders Report.    22  Assess Need for Indwelling Urinary Catheter - Follow Removal Protocol  Continuous        Comments: Indwelling Urinary Catheter Removal Criteria  Discontinue Indwelling Urinary Catheter Unless One of the Following is Present:  Urinary Retention or Obstruction  Chronic Urinary Catheter Use  End of Life  Critical Illness with Strict I/O   Tract or Abdominal Surgery  Stage 3/4 Sacral / Perineal  Wound  Required Activity Restriction: Trauma  Required Activity Restriction: Spine Surgery  If Patient is Being Followed by Urology Contact Them PRIOR to Removal  Do Not Remove Indwelling Urinary Catheter Order is Present with a CLINICAL REASON to Maintain the Catheter. Provider is Required to Include a Clinical Reason to Maintain a Urinary Catheter    Patient Admitted With Indwelling Urinary Catheter (Not Placed at [facility] Facility)  Assess for Continued Need & Document Medical Necessity  If Infection is Suspected, Contact the Provider       See Hyperspace for full Linked Orders Report.    12/20/22 0719    12/20/22 0718  Urinary Catheter Care  Every Shift      See Hyperspace for full Linked Orders Report.    12/20/22 0719    12/20/22 0718  Abdominal Prep With Clippers  Once         12/20/22 0719    12/20/22 0718  Chlorhexadine Skin Prep Unless Otherwise Indicated  Once         12/20/22 0719    12/20/22 0718  SCD (Sequential Compression Devices)  Once         12/20/22 0719    12/20/22 0718  POC Glucose Once  Once         12/20/22 0719    12/20/22 0718  Document Gatorade Consumption Prior to Admission (Yes or No)  Once         12/20/22 0719    12/20/22 0718  Inpatient Anesthesiology Consult  Once        Specialty:  Anesthesiology  Provider:  (Not yet assigned)    12/20/22 0719    12/20/22 0718  CBC (No Diff)  STAT         12/20/22 0719    12/20/22 0659  Notify physician for the following conditions:  Until Discontinued         12/20/22 0659    12/20/22 0658  famotidine (PEPCID) injection 20 mg  Once As Needed         12/20/22 0659    12/20/22 0658  ondansetron (ZOFRAN) injection 4 mg  Once As Needed         12/20/22 0659    12/20/22 0644  calcium gluconate injection 1 g  Once As Needed         12/20/22 0645    12/20/22 0357  Call MFM in the am to move appointment to 12/20/22.  Call MD if patient has another bout of bleeding or begins to contract like she is going into labor..  Misc Nursing Order (Specify)  Once         Comments: Call MFM in the am to move appointment to 12/20/22.  Call MD if patient has another bout of bleeding or begins to contract like she is going into labor..    12/20/22 0358    12/20/22 0325  Do a speculum exam to see how much blood is present and try to visualize the cervix.  Misc Nursing Order (Specify)  Once        Comments: Do a speculum exam to see how much blood is present and try to visualize the cervix.    12/20/22 0325    12/20/22 0145  lactated ringers bolus 1,000 mL  Once         12/20/22 0055    12/20/22 0056  NPO Diet NPO Type: Strict NPO  Diet Effective Now,   Status:  Canceled         12/20/22 0055    12/13/22 0500  CBC & Differential  Every Third Day       12/11/22 0700    12/13/22 0500  Type & Screen  Every 72 Hours       12/11/22 0700    12/08/22 1530  lactated ringers infusion  Every 6 Hours PRN         12/08/22 1352    12/07/22 0900  sodium chloride 0.9 % flush 10 mL  Every 12 Hours Scheduled         12/07/22 0550    12/07/22 0546  ondansetron (ZOFRAN) tablet 4 mg  Every 8 Hours PRN        See Hyperspace for full Linked Orders Report.    12/07/22 0550    12/07/22 0546  ondansetron (ZOFRAN) injection 4 mg  Every 8 Hours PRN        See Hyperspace for full Linked Orders Report.    12/07/22 0550    12/07/22 0541  lidocaine PF 1% (XYLOCAINE) injection 5 mL  As Needed         12/07/22 0550    12/07/22 0541  sodium chloride 0.9 % flush 10 mL  As Needed         12/07/22 0550    Unscheduled  Position Change - For Intra-Uterine Resusitation for Hypertonus, HyperStimulation or Non-Reassuring Fetal Status  As Needed       12/07/22 0550    Unscheduled  Blood Gas, Arterial -  As Needed       12/20/22 1010    --  prenatal vitamin (prenatal, CLASSIC, vitamin) tablet  Daily         12/07/22 0532    Signed and Held  Continuous Pulse Oximetry  Continuous       Signed and Held    Signed and Held  ondansetron (ZOFRAN) injection 4 mg  Every 6 Hours PRN         Signed and Held    Signed and Held  droperidol  (INAPSINE) injection 0.625 mg  Once        See Hyperspace for full Linked Orders Report.    Signed and Held    Signed and Held  droperidol (INAPSINE) injection 0.625 mg  Once        See Hyperspace for full Linked Orders Report.    Signed and Held    Signed and Held  nalbuphine (NUBAIN) injection 2.5 mg  Every 4 Hours PRN         Signed and Held    Signed and Held  naloxone (NARCAN) injection 0.4 mg  Every 1 Hour PRN         Signed and Held    Signed and Held  butorphanol (STADOL) injection 0.5 mg  Every 6 Hours PRN         Signed and Held    Signed and Held  Code Status and Medical Interventions:  Continuous         Signed and Held    Signed and Held  Vital Signs Per hospital policy  Per Hospital Policy         Signed and Held    Signed and Held  Notify Physician  Until Discontinued         Signed and Held    Signed and Held  Up with Assistance  As Needed         Signed and Held    Signed and Held  Ambulate Patient  2 Times Daily      Comments: After anesthesia wears off.    Signed and Held    Signed and Held  Patient May Shower  Once        Comments: After anesthesia wears off and with assistance    Signed and Held    Signed and Held  Diet: Texture: Regular Texture (IDDSI 7); Fluid Consistency: Thin (IDDSI 0); Regular/House Diet  Diet Effective Now        Comments: Comments entered here are not received by the  Department and are not considered part of the interpreted diet order. Please use the \"DIET MESSAGE\" order to communicate additional instructions to the diet office. If necessary, consult a registered dietitian.    Signed and Held    Signed and Held  Advance Diet as Tolerated  Until Discontinued         Signed and Held    Signed and Held  I/O  Every Shift       Signed and Held    Signed and Held  Fundal and Lochia Check  Per Hospital Policy        Comments: Q 30 min x 2, Q 1 hr x 4, Q 4 hrs x 24 hrs, then Q shift.    Signed and Held    Signed and Held  Weigh Patient  Once         Signed and Held     Signed and Held  Continue Indwelling Urinary Catheter Already in Place  Once         Signed and Held    Signed and Held  Discontinue Indwelling Urinary Catheter in AM  Once         Signed and Held    Signed and Held  Bladder Scan if Patient Unable to Void 4-6 Hours After Catheter Removal  As Needed         Signed and Held    Signed and Held  Straight Cath Every 4-6 Hours As Needed If Patient is Unable to Void After 4-6 Hours, Bladder Scan Volume is Greater Than 350-500mL & Patient Has Symptoms of Bladder Discomfort / Distention  As Needed         Signed and Held    Signed and Held  Notify Provider if Bladder Distention Continues  Until Discontinued         Signed and Held    Signed and Held  Schedule / Prompt Voiding For Patients With Urinary Incontinence  As Needed         Signed and Held    Signed and Held  Urinary Catheter Care  Every Shift       Signed and Held    Signed and Held  Abdominal Wound Care  As Needed        Comments: Postop day 1. Remove dressing and leave incision open to air.    Signed and Held    Signed and Held  Turn Cough Deep Breathe  Once         Signed and Held    Signed and Held  Incentive spirometry RT  Every Hour       Signed and Held    Signed and Held  Encourage early intake of PO fluids  Continuous         Signed and Held    Signed and Held  Ambulate Patient 3-5 times per day (with or without Clemente)  Every Shift       Signed and Held    Signed and Held  Chewing Gum  As Needed        Comments: Chew gum for 30 minutes three times a day after surgery    Signed and Held    Signed and Held  Apply Abdominal Binding Until Discontinued  Until Discontinued         Signed and Held    Signed and Held  \"If patient tolerates food and liquids after completion of second bag of Pitocin, saline lock IV and discontinue IV fluid infusions.  Once         Signed and Held    Signed and Held  Apply witch hazel pads / TUCKS to perineum as needed for comfort PRN  As Needed         Signed and Held    Signed  and Held  Remove Abdominal Dressing  Once         Signed and Held    Signed and Held  Warm compress  As Needed         Signed and Held    Signed and Held  Breast pump to bed  Once         Signed and Held    Signed and Held  Apply ace wrap, tight bra, or binder  As Needed         Signed and Held    Signed and Held  Apply ice packs  As Needed         Signed and Held    Signed and Held  CBC & Differential  Timed         Signed and Held    Signed and Held  acetaminophen (TYLENOL) tablet 1,000 mg  Every 6 Hours        See Hyperspace for full Linked Orders Report.    Signed and Held    Signed and Held  acetaminophen (TYLENOL) tablet 650 mg  Every 6 Hours        See Hyperspace for full Linked Orders Report.    Signed and Held    Signed and Held  ketorolac (TORADOL) injection 15 mg  Every 6 Hours        See Hyperspace for full Linked Orders Report.    Signed and Held    Signed and Held  ibuprofen (ADVIL,MOTRIN) tablet 600 mg  Every 6 Hours        See Hyperspace for full Linked Orders Report.    Signed and Held    Signed and Held  oxyCODONE (ROXICODONE) immediate release tablet 5 mg  Every 4 Hours PRN        See Hyperspace for full Linked Orders Report.    Signed and Held    Signed and Held  oxyCODONE (ROXICODONE) immediate release tablet 10 mg  Every 4 Hours PRN        See Hyperspace for full Linked Orders Report.    Signed and Held    Signed and Held  HYDROmorphone (DILAUDID) injection 0.5 mg  Every 2 Hours PRN        See Hyperspace for full Linked Orders Report.    Signed and Held    Signed and Held  naloxone (NARCAN) injection 0.1 mg  Every 5 Minutes PRN        See Hyperspace for full Linked Orders Report.    Signed and Held    Signed and Held  magnesium hydroxide (MILK OF MAGNESIA) suspension 10 mL  Daily PRN         Signed and Held    Signed and Held  docusate sodium (COLACE) capsule 100 mg  2 Times Daily PRN         Signed and Held    Signed and Held  simethicone (MYLICON) chewable tablet 80 mg  4 Times Daily PRN          Signed and Held    Signed and Held  ondansetron (ZOFRAN) tablet 4 mg  Every 8 Hours PRN         Signed and Held    Signed and Held  promethazine (PHENERGAN) tablet 12.5 mg  Every 4 Hours PRN         Signed and Held    Signed and Held  prenatal vitamin tablet 1 tablet  Daily         Signed and Held    Signed and Held  influenza vac split quad (FLUZONE,FLUARIX,AFLURIA,FLULAVAL) injection 0.5 mL  During Hospitalization         Signed and Held    Signed and Held  Measles, Mumps & Rubella Vac (MMR) injection 0.5 mL  During Hospitalization         Signed and Held                   Operative/Procedure Notes (all)      June Roe DO at 22 0929  Version 1 of 1         [facility]  Cassandra Del Cid  : 1990  MRN: [id number]  CSN: [id number]  Date: 2022    Operative Note     SECTION PRIMARY      Pre-op Diagnosis:  Clinical abruption   PPROM    Post-op Diagnosis:  Clinical abruption  PPROM   Procedure: Procedure(s):   SECTION PRIMARY   Surgeon: Surgeon(s):  June Roe DO       Anesthesia: Choice by NATE Hopkins CRNA     Estimated Blood Loss: 700   mls   Fluids: 1700   mls   UOP: 800   mls   Drains: Clemente catheter   ABx: Ancef     Specimens:  Placenta and cord   Findings: Normal uterus tubes and ovaries, approximately 100 and clots noted as soon as I entered the lower uterine segment   Complications: none      INDICATION: Cassandra Del Cid is a 32 y.o. female  3 para 2 at 29 weeks and 1 day initially admitted for  rupture of membranes.  She received latency antibiotics as well as betamethasone when she was initially admitted.  Last week she was noted to have an episode of bleeding which since resolved.  This morning starting around 3 AM she was noted to have bleeding again.  She continued to have a slow trickle of blood was noted to be john.  Clinically she had an abruption.  Risk benefits to primary  section were discussed and patient  elected to proceed.     PROCEDURE: After informed consent was obtained, the patient was taken to the operating room where spinal anesthesia was administered. Time out procedure was completed and perioperative antibiotics were administered. She was placed in the supine position with leftward tilt and her abdomen was prepped and draped in normal sterile fashion after a Clemente catheter was placed by nursing staff.   After confirming adequate anesthesia, a Pfannenstiel incision was made with a scalpel 2 fingerbreadths above the pubic symphysis.  This was carried down sharply to the underlying fascia which was incised in the midline.  The fascial incision was extended laterally with Srinivasan scissors.  The fascial edges were then elevated and the underlying rectus muscles dissected off with sharp technique.  The rectus muscles were  in the midline and the underlying peritoneum identified and entered bluntly.  The peritoneal incision was then extended and the Sanchez O retractor inserted.  The vesicouterine peritoneum was sharply incised with Metzenbaum scissors to create a bladder flap.  A low transverse uterine incision was made with a clean scalpel.  The uterine incision was bluntly extended and amniotomy was performed returning clear fluid.  The infant was delivered through the incision without difficulty from the jef breech position. The remainder of the infant was delivered without difficulty.  The infant was vigorous on the field, the cord was clamped and cut, and the infant handed off to waiting NICU team after delay cord clamping.  Cord blood and gases were obtained and the placenta then manually removed.  The uterus was exteriorized and cleared of clot and debris with a moist laparotomy sponge.  The uterine incision was closed with a running locked suture of 0 Monocryl.  A second imbricating layer of 0 Monocryl was placed for reinforcement.  The uterine incision was hemostatic.  The uterus was gently  returned to the abdomen. The Sanchez-0 was then removed.  The uterine incision was reinspected with hemostasis noted.  Surgicell was applied. The parietal peritoneum was then closed with a running suture of 2-0 Vicryl and the rectus muscles were reapproximated.  The subfascial spaces and rectus muscles were inspected with hemostasis noted.  The fascia was then closed with a running suture of 0 Vicryl.  The subcutaneous tissues were irrigated and made hemostatic with Bovie cautery.  The subcutaneous tissues were reapproximated with interrupted sutures of 2-0 plain gut.  The skin was closed with a subcuticular stitch of 4-0 Monocryl and reinforced with Steri-Strips.  A sterile pressure dressing was then applied.    After expressing the uterus the patient was transitioned to the stretcher and taken to the recovery room in stable condition.  She tolerated the procedure well without complications.  All sponge, needle, and instrument counts were correct ×3 per the OR staff.    June Roe DO   2022  10:24 CST        Electronically signed by June Roe DO at 22 1031          Physician Progress Notes (last 72 hours)      June Roe DO at 22 0812        Patient seen and evaluated at bedside.  Overnight patient started to have bleeding around 3 AM.  Patient has continued approximately 200-300 blood noted.  She is also had some irregular contractions.  At this time I am concerned for abruption.  Discussed with patient need to proceed with primary  section.  Discussed with patient risk benefits.  Risk including bleeding, infection as well as damage to underlying organs and viscera were discussed.  At this time.  Patient came in to needed emergency surgery.  I then went back into the patient's room and performed a speculum exam.  25 to 50 cc of blood were noted.  Her cervix was noted to be 1 cm.  We will continue to monitor patient for signs of bleeding and plan for   section once room is available.    Electronically signed by June Roe DO at 22 0825     June Roe DO at 22 0812          [facility]  Cassandra Del Cid  : 1990  MRN: [id number]  CSN: [id number]    Antepartum Progress Note    Subjective   Cassandra eDl Cid is a 32 y.o. year old  with an Estimated Date of Delivery: 3/6/23 currently at 29w0d who initially presented with leaking fluid.  The patient currently reports leaking fluid. She denies bleeding at this time.     Prenatal care has been with Janet.  It has been complicated by PPROM.      Objective     Min/max vitals past 24 hours:   Temp  Min: 97.8 °F (36.6 °C)  Max: 98.5 °F (36.9 °C)  BP  Min: 93/57  Max: 105/58  Pulse  Min: 78  Max: 95  Resp  Min: 16  Max: 18         General: well developed; well nourished  no acute distress   Heart: Not performed.   Lungs: breathing is unlabored   Abdomen: soft, non-tender; no masses  no umbilical or inguinal hernias are present  no hepato-splenomegaly   FHT's: 140 bpm, mod delonte   Cervix: was not checked.   Contractions: occasional              Assessment   1. IUP at 29w0d  2. GBS unknown   3. PPROM  4. History of previous  section   5. RH negative     Plan   1. Type and Screen done today   2. S/p antibiotics   3. Continue to monitor for signs of infection     June Roe DO  2022  08:12 CST              Electronically signed by June Roe DO at 22 1353     Tom Tena MD at 22 0819              Tom Tena MD  [facility]  [address] [place] [address] [address] [place] [address]  Office [phone]  Fax [phone]      [facility]  Cassandra Del Cid  : 1990  MRN: [id number]  CSN: [id number]    Antepartum Progress note    Subjective   She reports is having no problems. Endorses appropriate fetal movement. Denies contractions or vaginal bleeding. Reports still with leakage of clear fluid.     Objective   Min/max  vitals past 24 hours:  Temp  Min: 97.7 °F (36.5 °C)  Max: 98.5 °F (36.9 °C)   BP  Min: 81/47  Max: 109/65   Pulse  Min: 85  Max: 96   Resp  Min: 16  Max: 18        General: Well Developed, Well Nourished, not in acute distress   HEENT: normocephalic, atraumatic, conjunctiva non-icteric    Respiratory:  non-labored, symmetrical chest rise   Cardiovascular: regular rate, no JVD  Gastrointestinal: gravid, soft, no TTP, no rebound tenderness or guarding to palpation, no palpable ctx   Neurologic: alert and oriented, CN II-XII grossly intact without focal deficit  Psychiatric: normal mood, pleasant, cooperative  Musculoskeletal: negative calf tenderness, no lower extremity edema  Skin: warm & dry, no cyanosis, no jaundice    Fetal Monitoring  FHT's: Reactive, intermittent decelerations but with return to baseline and with moderate variability and accelerations between decelerations  external monitors used   Contractions: none     Assessment   1. IUP at 28w6d by LMP c/w 7- & 10-week ultrasounds, HEENA 3/6/23  2. PPROM  -s/p BMZ -  -s/p latency antibiotics -   3. Breech presentation   4. Rh negative, s/p RhoGAM 2022    Plan   1.   Continue inpatient care secondary to PPROM  2.  CEFM secondary to intermittent decelerations  3.  Type and screen  every 72 hours  4.  Weekly MFM evaluations and ultrasounds, occurring on   5.  Monitor for signs/symptoms of IAI  6.  Monitor for signs/symptoms of  labor  7.  Monitor for vaginal bleeding  8.  Plan for delivery at 34 weeks gestation via  section secondary to PPROM as well as breech presentation, or clinically sooner if indicated    Follow-up with MFM on Monday, 2022 to confirm patient's HEENA as there is differences in patient's stated gestational age from prior notes. She has passed her 1-hr GTT and received rhogam on 2022    Tom Tena MD  2022  08:19 CST            Electronically signed by Tom Tena MD at  12/18/22 0836

## 2022-12-21 LAB
ABO GROUP BLD: NORMAL
BASOPHILS # BLD AUTO: 0.02 10*3/MM3 (ref 0–0.2)
BASOPHILS NFR BLD AUTO: 0.2 % (ref 0–1.5)
BLD GP AB SCN SERPL QL: POSITIVE
DEPRECATED RDW RBC AUTO: 48.4 FL (ref 37–54)
EOSINOPHIL # BLD AUTO: 0.01 10*3/MM3 (ref 0–0.4)
EOSINOPHIL NFR BLD AUTO: 0.1 % (ref 0.3–6.2)
ERYTHROCYTE [DISTWIDTH] IN BLOOD BY AUTOMATED COUNT: 13.6 % (ref 12.3–15.4)
FETAL BLEED: NEGATIVE
HCT VFR BLD AUTO: 28.4 % (ref 34–46.6)
HGB BLD-MCNC: 9.3 G/DL (ref 12–15.9)
IMM GRANULOCYTES # BLD AUTO: 0.08 10*3/MM3 (ref 0–0.05)
IMM GRANULOCYTES NFR BLD AUTO: 0.8 % (ref 0–0.5)
LYMPHOCYTES # BLD AUTO: 1.05 10*3/MM3 (ref 0.7–3.1)
LYMPHOCYTES NFR BLD AUTO: 10.3 % (ref 19.6–45.3)
MCH RBC QN AUTO: 32.2 PG (ref 26.6–33)
MCHC RBC AUTO-ENTMCNC: 32.7 G/DL (ref 31.5–35.7)
MCV RBC AUTO: 98.3 FL (ref 79–97)
MONOCYTES # BLD AUTO: 0.62 10*3/MM3 (ref 0.1–0.9)
MONOCYTES NFR BLD AUTO: 6.1 % (ref 5–12)
NEUTROPHILS NFR BLD AUTO: 8.43 10*3/MM3 (ref 1.7–7)
NEUTROPHILS NFR BLD AUTO: 82.5 % (ref 42.7–76)
NRBC BLD AUTO-RTO: 0 /100 WBC (ref 0–0.2)
NUMBER OF DOSES: NORMAL
PLATELET # BLD AUTO: 189 10*3/MM3 (ref 140–450)
PMV BLD AUTO: 10 FL (ref 6–12)
RBC # BLD AUTO: 2.89 10*6/MM3 (ref 3.77–5.28)
RH BLD: NEGATIVE
WBC NRBC COR # BLD: 10.21 10*3/MM3 (ref 3.4–10.8)

## 2022-12-21 PROCEDURE — 0503F POSTPARTUM CARE VISIT: CPT | Performed by: OBSTETRICS & GYNECOLOGY

## 2022-12-21 PROCEDURE — 25010000002 KETOROLAC TROMETHAMINE PER 15 MG: Performed by: OBSTETRICS & GYNECOLOGY

## 2022-12-21 PROCEDURE — 85461 HEMOGLOBIN FETAL: CPT | Performed by: OBSTETRICS & GYNECOLOGY

## 2022-12-21 PROCEDURE — 85025 COMPLETE CBC W/AUTO DIFF WBC: CPT | Performed by: OBSTETRICS & GYNECOLOGY

## 2022-12-21 PROCEDURE — 86901 BLOOD TYPING SEROLOGIC RH(D): CPT | Performed by: OBSTETRICS & GYNECOLOGY

## 2022-12-21 PROCEDURE — 86850 RBC ANTIBODY SCREEN: CPT

## 2022-12-21 PROCEDURE — 25010000002 RHO D IMMUNE GLOBULIN 1500 UNITS SOLUTION PREFILLED SYRINGE: Performed by: OBSTETRICS & GYNECOLOGY

## 2022-12-21 PROCEDURE — 86900 BLOOD TYPING SEROLOGIC ABO: CPT | Performed by: OBSTETRICS & GYNECOLOGY

## 2022-12-21 RX ADMIN — ACETAMINOPHEN 650 MG: 325 TABLET, FILM COATED ORAL at 09:26

## 2022-12-21 RX ADMIN — ACETAMINOPHEN 650 MG: 325 TABLET, FILM COATED ORAL at 21:38

## 2022-12-21 RX ADMIN — PRENATAL VIT W/ FE FUMARATE-FA TAB 27-0.8 MG 1 TABLET: 27-0.8 TAB at 09:26

## 2022-12-21 RX ADMIN — KETOROLAC TROMETHAMINE 15 MG: 15 INJECTION, SOLUTION INTRAMUSCULAR; INTRAVENOUS at 00:48

## 2022-12-21 RX ADMIN — KETOROLAC TROMETHAMINE 15 MG: 15 INJECTION, SOLUTION INTRAMUSCULAR; INTRAVENOUS at 06:16

## 2022-12-21 RX ADMIN — HUMAN RHO(D) IMMUNE GLOBULIN 1500 UNITS: 300 INJECTION, SOLUTION INTRAMUSCULAR at 15:20

## 2022-12-21 RX ADMIN — ACETAMINOPHEN 1000 MG: 500 TABLET ORAL at 04:26

## 2022-12-21 RX ADMIN — ACETAMINOPHEN 650 MG: 325 TABLET, FILM COATED ORAL at 15:37

## 2022-12-21 RX ADMIN — IBUPROFEN 600 MG: 600 TABLET, FILM COATED ORAL at 18:34

## 2022-12-21 RX ADMIN — KETOROLAC TROMETHAMINE 15 MG: 15 INJECTION, SOLUTION INTRAMUSCULAR; INTRAVENOUS at 12:20

## 2022-12-21 NOTE — PROGRESS NOTES
Tom Tena MD  [facility]  [address]  [place] [address]  Office [phone]  Fax [phone]      [facility]   PROGRESS NOTE    Post-Op Day 1 S/P   Subjective     Patient reports:  Pain is well controlled with acetaminophen, prescription NSAID's including ketorolac (Toradol) and narcotic analgesics including oxycodone (Oxycontin, Oxyir).  She is ambulating. Tolerating diet. Voiding - without difficulty; flatus reported..  Vaginal bleeding is as much as expected.      Objective      Vitals: Vital Signs Range for the last 24 hours  Temperature: Temp:  [97.4 °F (36.3 °C)-98.7 °F (37.1 °C)] 98.2 °F (36.8 °C)   Temp Source: Temp src: Temporal   BP: BP: ()/(41-81) 110/81   Pulse: Heart Rate:  [68-90] 82   Respirations: Resp:  [16-18] 18   SPO2: SpO2:  [97 %-99 %] 98 %   O2 Amount (l/min):     O2 Devices Device (Oxygen Therapy): room air   Weight:              Physical Exam    Lungs non-labored, symmetrical chest rise   Abdomen Soft, no distension, no TTP, no guarding/rebound   Incision  healing well, no drainage, no erythema, no hernia, no seroma, no swelling, well approximated   Extremities extremities normal, atraumatic, no cyanosis or edema     I reviewed the patient's new clinical results.  Lab Results (last 24 hours)     Procedure Component Value Units Date/Time    CBC & Differential [[id number]]  (Abnormal) Collected: 22    Specimen: Blood Updated: 22    Narrative:      The following orders were created for panel order CBC & Differential.  Procedure                               Abnormality         Status                     ---------                               -----------         ------                     CBC Auto Differential[[id number]]        Abnormal            Final result                 Please view results for these tests on the individual orders.    CBC Auto Differential [[id number]]  (Abnormal) Collected: 22    Specimen:  Blood Updated: 12/21/22 0640     WBC 10.21 10*3/mm3      RBC 2.89 10*6/mm3      Hemoglobin 9.3 g/dL      Hematocrit 28.4 %      MCV 98.3 fL      MCH 32.2 pg      MCHC 32.7 g/dL      RDW 13.6 %      RDW-SD 48.4 fl      MPV 10.0 fL      Platelets 189 10*3/mm3      Neutrophil % 82.5 %      Lymphocyte % 10.3 %      Monocyte % 6.1 %      Eosinophil % 0.1 %      Basophil % 0.2 %      Immature Grans % 0.8 %      Neutrophils, Absolute 8.43 10*3/mm3      Lymphocytes, Absolute 1.05 10*3/mm3      Monocytes, Absolute 0.62 10*3/mm3      Eosinophils, Absolute 0.01 10*3/mm3      Basophils, Absolute 0.02 10*3/mm3      Immature Grans, Absolute 0.08 10*3/mm3      nRBC 0.0 /100 WBC     Tissue Pathology Exam [[id number]] Collected: 12/20/22 1040    Specimen: Tissue from Placenta Updated: 12/20/22 1429          External Prenatal Results       Pregnancy Outside Results - Transcribed From Office Records - See Scanned Records For Details       Test Value Date Time    ABO  O  10/21/22 1445    Rh  Positive  10/21/22 1445    Antibody Screen  Negative  10/21/22 1445       Negative  03/07/22 0802    Varicella IgG  740 index 12/16/21 1256    Rubella  1.19 index 03/07/22 0802    Hgb  8.3 g/dL 10/23/22 0809       9.6 g/dL 10/21/22 1445       10.3 g/dL 08/05/22 1321       10.7 g/dL 07/07/22 0808       12.1 g/dL 03/07/22 0802    Hct  26.8 % 10/23/22 0809       30.5 % 10/21/22 1445       33.8 % 07/07/22 0808       36.7 % 03/07/22 0802    Glucose Fasting GTT       Glucose Tolerance Test 1 hour       Glucose Tolerance Test 3 hour       Gonorrhea (discrete)  Negative  09/28/22 1059       Negative  03/07/22 0802    Chlamydia (discrete)  Negative  09/28/22 1059       Negative  03/07/22 0802    RPR  Non Reactive  03/07/22 0802    VDRL       Syphilis Antibody       HBsAg  Negative  03/07/22 0802    Herpes Simplex Virus PCR       Herpes Simplex VIrus Culture       HIV  Non Reactive  03/07/22 0802    Hep C RNA Quant PCR       Hep C Antibody  0.1 s/co ratio  22 0802    AFP       Group B Strep  Negative  22 1059    GBS Susceptibility to Clindamycin       GBS Susceptibility to Erythromycin       Fetal Fibronectin       Genetic Testing, Maternal Blood                 Drug Screening       Test Value Date Time    Urine Drug Screen       Amphetamine Screen       Barbiturate Screen       Benzodiazepine Screen       Methadone Screen       Phencyclidine Screen       Opiates Screen       THC Screen       Cocaine Screen       Propoxyphene Screen       Buprenorphine Screen       Methamphetamine Screen       Oxycodone Screen       Tricyclic Antidepressants Screen                 Legend    ^: Historical                            Assessment & Plan        Threatened premature labor     premature rupture of membranes (PPROM) with unknown onset of labor    Breech presentation with  problem    Placental abruption, third trimester        Assessment:    Cassandra Del Cid is Day 1  post-partum  , Low Transverse   .       Plan:  remove dressing, saline lock IV fluids, advance diet  normal diet as tolerated, continue post op care and Rh negative: needs Rh Immunoglobulin.        Tom Tena MD  2022  08:02 CST

## 2022-12-21 NOTE — LACTATION NOTE
This note was copied from a baby's chart.  Mother's Name: Cassandra  Infant's Name: Yoana Ramos  Day:01  Dx:  delivery  Birth Gestation:29w1d  Adjusted Gestation:29w2d  Birth weight: 3-1.4 (1400g)  Last weight:   3-3.5 (1460g)  % of weight loss:    Feeding Orders:3 ml every 3 hours  Maternal Hx:,  labor  Prenatal Medications: PNV  Pump available: RX faxed to [facility]  Pumping history in the last 24 hours: pumping initiated within 4 hours of delivery, mother pumping every 3 hours    Follow up with mother to discuss pumping progress, she states she continues to pump but not collecting much at this point. Encouragement provided, reassured mother normalcy of collection amounts at this time. Questions and needs denied at this time.

## 2022-12-21 NOTE — PLAN OF CARE
Goal Outcome Evaluation:  Plan of Care Reviewed With: patient        Progress: improving  Outcome Evaluation: VSS, ALT incision with pressure dressing, ff ml uu scant/light lochia, nausea and vomiting resolved, ambulated this AM - catheter removed - due to void by 1230

## 2022-12-21 NOTE — LACTATION NOTE
Mother's Name: Cassandra  Infant's Name: Yoana Ramos  Day:0  Dx:  delivery  Birth Gestation:29w1d  Adjusted Gestation:29w1d  Birth weight: 3-1.4 (1400g)  Last weight:              % of weight loss:    Feeding Orders:NPO  Maternal Hx:,  labor  Prenatal Medications: PNV  Pump available: RX faxed to [facility]  Pumping history in the last 24 hours:     F/U with mom.  She is resting at this time, but stated she is getting ready to pump again.  She is only getting small amounts, drops, at this time.  Reiterated that is normal and to continue to pump for the stimulation of milk production.  Gave infant ID labels to put on colostrum collected.  Offered assistance with pumping or hand expression as needed tonight.

## 2022-12-21 NOTE — PLAN OF CARE
Goal Outcome Evaluation:  Plan of Care Reviewed With: patient, spouse   VSS, fundus firm, midline U1, lochia scant, voiding without difficulty, denies pain and continue to tolerate activities, LTV with dressing dry and intact. I am planning on giving pt her Rhogam this afternoon and pt has requested to receive TDAP vaccine and refused Flu vaccine. Up ambulating in room and continue to pass flatus, To Nicu to visit with infant today. EPDS 0.     Progress: improving

## 2022-12-21 NOTE — ANESTHESIA POSTPROCEDURE EVALUATION
Patient: Cassandra Del Cid    Procedure Summary     Date: 22 Room / Location:  [facility]OR DELIVERY 2 /  [facility] DELIVERY    Anesthesia Start: 916 Anesthesia Stop:     Procedure:  SECTION PRIMARY (Abdomen) Diagnosis:     Surgeons: June Roe DO Provider: Juan Miguel Hopkins CRNA    Anesthesia Type: spinal, ITN ASA Status: 2 - Emergent          Anesthesia Type: spinal, ITN    Vitals  Vitals Value Taken Time   /81 22 0705   Temp 98.2 °F (36.8 °C) 22 0705   Pulse 82 22 0705   Resp 18 22 0705   SpO2 98 % 22 07           Post Anesthesia Care and Evaluation    Anesthetic complications: No anesthetic complications  Post Neuraxial Block status: Motor and sensory function returned to baseline and No signs or symptoms of PDPH  Comments: Blood pressure 110/81, pulse 82, temperature 98.2 °F (36.8 °C), temperature source Temporal, resp. rate 18, last menstrual period 2022, SpO2 98 %, currently breastfeeding.

## 2022-12-22 VITALS
RESPIRATION RATE: 16 BRPM | TEMPERATURE: 97.4 F | HEART RATE: 76 BPM | SYSTOLIC BLOOD PRESSURE: 102 MMHG | DIASTOLIC BLOOD PRESSURE: 84 MMHG | OXYGEN SATURATION: 98 %

## 2022-12-22 PROCEDURE — 0503F POSTPARTUM CARE VISIT: CPT | Performed by: ADVANCED PRACTICE MIDWIFE

## 2022-12-22 RX ORDER — IBUPROFEN 600 MG/1
600 TABLET ORAL EVERY 6 HOURS
Start: 2022-12-22 | End: 2022-12-25

## 2022-12-22 RX ORDER — ACETAMINOPHEN 325 MG/1
650 TABLET ORAL EVERY 6 HOURS
Start: 2022-12-22 | End: 2023-02-02

## 2022-12-22 RX ADMIN — ACETAMINOPHEN 650 MG: 325 TABLET, FILM COATED ORAL at 10:33

## 2022-12-22 RX ADMIN — ACETAMINOPHEN 650 MG: 325 TABLET, FILM COATED ORAL at 03:48

## 2022-12-22 RX ADMIN — IBUPROFEN 600 MG: 600 TABLET, FILM COATED ORAL at 00:48

## 2022-12-22 RX ADMIN — IBUPROFEN 600 MG: 600 TABLET, FILM COATED ORAL at 06:43

## 2022-12-22 NOTE — PAYOR COMM NOTE
[facility]# [id number]            DISCHARGE NOTIFICATION   12-  MOB DC'D HOME TODAY...... INFANT REMAINS IN NICU.    THANKS, HAROLDO PARKS [facility]/ UR       P: [phone]       F: [phone]      Cassandra Del Cid (32 y.o. Female)     Date of Birth   1990    Social Security Number       Address   [address] [place], [place] [address]    Home Phone   [phone]    MRN   [id number]       Islam   Worship    Marital Status                               Admission Date   12/7/22    Admission Type   Elective    Admitting Provider   Olive Romero MD    Attending Provider       Department, Room/Bed   [facility] MOTHER BABY 2A, M223/1       Discharge Date   12/22/2022    Discharge Disposition   Home or Self Care    Discharge Destination                               Attending Provider: (none)   Allergies: Penicillins    Isolation: None   Infection: None   Code Status: CPR    Ht: --   Wt: --    Admission Cmt: None   Principal Problem: Threatened premature labor [O47.00]                 Active Insurance as of 12/7/2022     Primary Coverage     Payor Plan Insurance Group Employer/Plan Group    [employer] PPO [id number]     Payor Plan Address Payor Plan Phone Number Payor Plan Fax Number Effective Dates    [id number] [phone]  7/1/2022 - None Entered    [place] [address]       Subscriber Name Subscriber Birth Date Member ID       EUGENIA DEL CID 8/6/1994 [id number]                 Emergency Contacts      (Rel.) Home Phone Work Phone Mobile Phone    Eugenia Del Cid (Spouse) [phone] -- --               Discharge Summary      Shanice Bledsoe APRN at 12/22/22 0816     Attestation signed by Francisco Olson MD at 12/22/22 0903    I have reviewed this documentation and agree.                                                          MARIAM Goodman  [facility]  [address]  [place] [address]  Office  [phone]  Fax [phone]    Discharge Summary     Susanne Del Cid  : 1990  MRN: [id number]  [facility]: [id number]    Date of Admission: 2022   Date of Discharge:  2022   Delivering Physician: June Roe DO       Admission Diagnosis: 1. Threatened premature labor [O47.00]   Discharge Diagnosis: 1. Pregnancy at 29w1d - delivered       Procedures: 1. Primary  (LTCS)     Hospital Course  See the completed operative report for details regarding antepartum course and delivery.    Her postoperative course was unremarkable.  On POD # 2 she felt like she was ready for discharge.  She was evaluated by Dr. Olson who agreed she was able to be discharged to home.  She had no febrile morbidity. She had normal bowel and bladder function and was hemodynamically stable.  Her wound was healing well without obvious signs of infections.    Infant  female  fetus weighing 1400 g (3 lb 1.4 oz)   Apgars -  7 @ 1 minute /  8 @ 5 minutes.    Discharge labs  Lab Results   Component Value Date    WBC 10.21 2022    HGB 9.3 (L) 2022    HCT 28.4 (L) 2022     2022       Discharge Medications     Discharge Medications      New Medications      Instructions Start Date   acetaminophen 325 MG tablet  Commonly known as: TYLENOL   650 mg, Oral, Every 6 Hours      ibuprofen 600 MG tablet  Commonly known as: ADVIL,MOTRIN   600 mg, Oral, Every 6 Hours, Then take 2 tablets every 4 hours as needed for mild pain, 3 tablets every 6 hours as needed for moderate pain, or 4 tablets every 8 hours as needed for more moderate pain         Continue These Medications      Instructions Start Date   prenatal (CLASSIC) vitamin  tablet  Generic drug: prenatal vitamin   Oral, Daily         Stop These Medications    azithromycin 250 MG tablet  Commonly known as: ZITHROMAX     neomycin-polymyxin-hydrocortisone 3.5-88542-7 otic suspension  Commonly known as: CORTISPORIN      norgestimate-ethinyl estradiol 0.25-35 MG-MCG per tablet  Commonly known as: Sprintec 28            External Prenatal Results     Pregnancy Outside Results - Transcribed From Office Records - See Scanned Records For Details     Test Value Date Time    ABO  O  12/21/22 0610    Rh  Negative  12/21/22 0610    Antibody Screen  Positive  12/21/22 0610       Positive  12/19/22 0508       Positive  12/16/22 0524       Positive  12/13/22 0520       Positive  12/10/22 0813       Positive  12/07/22 0600      ^ NEG  11/28/22 1009       Positive  11/08/22 1538       Positive  11/08/22 1538       Positive  09/26/22 1031      ^ NEG  08/05/22 2354      ^ POS  07/27/22 1458    Varicella IgG       Rubella ^ Reactive  07/27/22 1458    Hgb  9.3 g/dL 12/21/22 0610       11.0 g/dL 12/20/22 0728       10.9 g/dL 12/19/22 0507       11.3 g/dL 12/16/22 0524       11.4 g/dL 12/13/22 0523       11.3 g/dL 12/10/22 0813       11.3 g/dL 12/07/22 0600      ^ 12.1 g/dL 11/28/22 1009      ^ 11.9 g/dL 10/24/22 1139      ^ 12.8 g/dL 08/05/22 2350      ^ 12.9 g/dL 07/27/22 1458    Hct  28.4 % 12/21/22 0610       34.3 % 12/20/22 0728       32.7 % 12/19/22 0507       34.8 % 12/16/22 0524       35.9 % 12/13/22 0523       35.6 % 12/10/22 0813       34.9 % 12/07/22 0600      ^ 36.2 % 11/28/22 1009      ^ 36.5 % 10/24/22 1139      ^ 37.5 % 08/05/22 2350      ^ 39.1 % 07/27/22 1458    Glucose Fasting GTT       Glucose Tolerance Test 1 hour       Glucose Tolerance Test 3 hour       Gonorrhea (discrete)       Chlamydia (discrete)       RPR ^ Non-reactive  07/27/22 1458    VDRL       Syphilis Antibody       HBsAg ^ Non-Reactive  07/27/22 1458    Herpes Simplex Virus PCR       Herpes Simplex VIrus Culture       HIV       Hep C RNA Quant PCR       Hep C Antibody ^ Non-Reactive  07/27/22 1458    AFP       Group B Strep       GBS Susceptibility to Clindamycin       GBS Susceptibility to Erythromycin       Fetal Fibronectin       Genetic Testing, Maternal Blood              Drug Screening     Test Value Date Time    Urine Drug Screen       Amphetamine Screen       Barbiturate Screen       Benzodiazepine Screen       Methadone Screen       Phencyclidine Screen       Opiates Screen       THC Screen       Cocaine Screen       Propoxyphene Screen       Buprenorphine Screen       Methamphetamine Screen       Oxycodone Screen       Tricyclic Antidepressants Screen             Legend    ^: Historical                        Discharge Disposition Home or Self Care   Condition on Discharge: good   Follow-up: 2 weeks with Dr. Zepeda   Plan for discharge with Dr. Olson.    This note has been signed electronically.    Shanice Bledsoe, DNP, APRN, CNM, RNC-OB  12/22/2022        Electronically signed by Francisco Olson MD at 12/22/22 0903       Discharge Order (From admission, onward)     Start     Ordered    12/22/22 0813  Discharge patient  Once        Expected Discharge Date: 12/22/22    Discharge Disposition: Home or Self Care    Physician of Record for Attribution - Please select from Treatment Team: MANJIT ZEPEDA [[id number]]    Review needed by CMO to determine Physician of Record: No       Question Answer Comment   Physician of Record for Attribution - Please select from Treatment Team MANJIT ZEPEDA    Review needed by CMO to determine Physician of Record No        12/22/22 0878

## 2022-12-22 NOTE — PROGRESS NOTES
MARIAM Goodman  [facility]  [address]  [place] [address]  Office [phone]  Fax [phone]      [facility]   PROGRESS NOTE    Post-Op Day 2 S/P   Subjective     Patient reports:  Pain is well controlled with Motrin and Tylenol.  She is ambulating. Tolerating diet. Voiding - without difficulty; flatus reported..  Vaginal bleeding is as much as expected.      Objective      Vitals: Vital Signs Range for the last 24 hours  Temperature: Temp:  [97 °F (36.1 °C)-97.9 °F (36.6 °C)] 97.2 °F (36.2 °C)   Temp Source: Temp src: Temporal   BP: BP: ()/(54-62) 94/54   Pulse: Heart Rate:  [70-94] 70   Respirations: Resp:  [16-18] 16   SPO2: SpO2:  [97 %-98 %] 97 %   O2 Amount (l/min):     O2 Devices Device (Oxygen Therapy): room air   Weight:              Physical Exam    Lungs Respirations even and unlabored.   Abdomen Soft, non-tender, no organomegaly or masses.   Incision  healing well, no drainage, no erythema, no swelling, well approximated, steri-strips intact   Extremities extremities normal, atraumatic, no cyanosis or edema     I reviewed the patient's new clinical results.  Lab Results (last 24 hours)     ** No results found for the last 24 hours. **          External Prenatal Results     Pregnancy Outside Results - Transcribed From Office Records - See Scanned Records For Details     Test Value Date Time    ABO  O  22 0610    Rh  Negative  22 0610    Antibody Screen  Positive  22 0610       Positive  22 0508       Positive  22 0524       Positive  22 0520       Positive  12/10/22 0813       Positive  22 0600      ^ NEG  22 1009       Positive  22 1538       Positive  22 1538       Positive  22 1031      ^ NEG  22 2354      ^ POS  22 1458    Varicella IgG       Rubella ^ Reactive  22 1458    Hgb  9.3 g/dL 22 0610       11.0 g/dL 22 0728       10.9 g/dL 22 0507        11.3 g/dL 22 0524       11.4 g/dL 22 0523       11.3 g/dL 12/10/22 0813       11.3 g/dL 22 0600      ^ 12.1 g/dL 22 1009      ^ 11.9 g/dL 10/24/22 1139      ^ 12.8 g/dL 22 2350      ^ 12.9 g/dL 22 1458    Hct  28.4 % 22 0610       34.3 % 22 0728       32.7 % 22 0507       34.8 % 22 0524       35.9 % 22 0523       35.6 % 12/10/22 0813       34.9 % 22 0600      ^ 36.2 % 22 1009      ^ 36.5 % 10/24/22 1139      ^ 37.5 % 22 2350      ^ 39.1 % 22 1458    Glucose Fasting GTT       Glucose Tolerance Test 1 hour       Glucose Tolerance Test 3 hour       Gonorrhea (discrete)       Chlamydia (discrete)       RPR ^ Non-reactive  22 1458    VDRL       Syphilis Antibody       HBsAg ^ Non-Reactive  22 1458    Herpes Simplex Virus PCR       Herpes Simplex VIrus Culture       HIV       Hep C RNA Quant PCR       Hep C Antibody ^ Non-Reactive  22 1458    AFP       Group B Strep       GBS Susceptibility to Clindamycin       GBS Susceptibility to Erythromycin       Fetal Fibronectin       Genetic Testing, Maternal Blood             Drug Screening     Test Value Date Time    Urine Drug Screen       Amphetamine Screen       Barbiturate Screen       Benzodiazepine Screen       Methadone Screen       Phencyclidine Screen       Opiates Screen       THC Screen       Cocaine Screen       Propoxyphene Screen       Buprenorphine Screen       Methamphetamine Screen       Oxycodone Screen       Tricyclic Antidepressants Screen             Legend    ^: Historical                        Assessment & Plan        Threatened premature labor     premature rupture of membranes (PPROM) with unknown onset of labor    Breech presentation with  problem    Placental abruption, third trimester      Assessment:    Cassandra Del Cid is Day 2  post-partum  , Low Transverse   .       Plan:  Continue post op care, Rh negative: Rh  Immunoglobulin given 12/21/2022. Plan for discharge today.      Plan for discharge reviewed with Dr. Olson.    This note has been signed electronically.    Shanice Bledsoe DNP, APRN, CNM, RNC-OB  12/22/2022  08:09 CST

## 2022-12-22 NOTE — DISCHARGE SUMMARY
Shanice Bledsoe, MARIAM  [facility]  [address]  [place] [id number]  Office [phone]  Fax [phone]    Discharge Summary    [facility]  Cassandra Del Cid  : 1990  MRN: [id number]  CSN: [id number]    Date of Admission: 2022   Date of Discharge:  2022   Delivering Physician: June Roe DO       Admission Diagnosis: 1. Threatened premature labor [O47.00]   Discharge Diagnosis: 1. Pregnancy at 29w1d - delivered       Procedures: 1. Primary  (LTCS)     Hospital Course  See the completed operative report for details regarding antepartum course and delivery.    Her postoperative course was unremarkable.  On POD # 2 she felt like she was ready for discharge.  She was evaluated by Dr. Olson who agreed she was able to be discharged to home.  She had no febrile morbidity. She had normal bowel and bladder function and was hemodynamically stable.  Her wound was healing well without obvious signs of infections.    Infant  female  fetus weighing 1400 g (3 lb 1.4 oz)   Apgars -  7 @ 1 minute /  8 @ 5 minutes.    Discharge labs  Lab Results   Component Value Date    WBC 10.21 2022    HGB 9.3 (L) 2022    HCT 28.4 (L) 2022     2022       Discharge Medications     Discharge Medications      New Medications      Instructions Start Date   acetaminophen 325 MG tablet  Commonly known as: TYLENOL   650 mg, Oral, Every 6 Hours      ibuprofen 600 MG tablet  Commonly known as: ADVIL,MOTRIN   600 mg, Oral, Every 6 Hours, Then take 2 tablets every 4 hours as needed for mild pain, 3 tablets every 6 hours as needed for moderate pain, or 4 tablets every 8 hours as needed for more moderate pain         Continue These Medications      Instructions Start Date   prenatal (CLASSIC) vitamin  tablet  Generic drug: prenatal vitamin   Oral, Daily         Stop These Medications    azithromycin 250 MG tablet  Commonly known as:  ZITHROMAX     neomycin-polymyxin-hydrocortisone 3.5-30640-0 otic suspension  Commonly known as: CORTISPORIN     norgestimate-ethinyl estradiol 0.25-35 MG-MCG per tablet  Commonly known as: Sprintec 28            External Prenatal Results     Pregnancy Outside Results - Transcribed From Office Records - See Scanned Records For Details     Test Value Date Time    ABO  O  12/21/22 0610    Rh  Negative  12/21/22 0610    Antibody Screen  Positive  12/21/22 0610       Positive  12/19/22 0508       Positive  12/16/22 0524       Positive  12/13/22 0520       Positive  12/10/22 0813       Positive  12/07/22 0600      ^ NEG  11/28/22 1009       Positive  11/08/22 1538       Positive  11/08/22 1538       Positive  09/26/22 1031      ^ NEG  08/05/22 2354      ^ POS  07/27/22 1458    Varicella IgG       Rubella ^ Reactive  07/27/22 1458    Hgb  9.3 g/dL 12/21/22 0610       11.0 g/dL 12/20/22 0728       10.9 g/dL 12/19/22 0507       11.3 g/dL 12/16/22 0524       11.4 g/dL 12/13/22 0523       11.3 g/dL 12/10/22 0813       11.3 g/dL 12/07/22 0600      ^ 12.1 g/dL 11/28/22 1009      ^ 11.9 g/dL 10/24/22 1139      ^ 12.8 g/dL 08/05/22 2350      ^ 12.9 g/dL 07/27/22 1458    Hct  28.4 % 12/21/22 0610       34.3 % 12/20/22 0728       32.7 % 12/19/22 0507       34.8 % 12/16/22 0524       35.9 % 12/13/22 0523       35.6 % 12/10/22 0813       34.9 % 12/07/22 0600      ^ 36.2 % 11/28/22 1009      ^ 36.5 % 10/24/22 1139      ^ 37.5 % 08/05/22 2350      ^ 39.1 % 07/27/22 1458    Glucose Fasting GTT       Glucose Tolerance Test 1 hour       Glucose Tolerance Test 3 hour       Gonorrhea (discrete)       Chlamydia (discrete)       RPR ^ Non-reactive  07/27/22 1458    VDRL       Syphilis Antibody       HBsAg ^ Non-Reactive  07/27/22 1458    Herpes Simplex Virus PCR       Herpes Simplex VIrus Culture       HIV       Hep C RNA Quant PCR       Hep C Antibody ^ Non-Reactive  07/27/22 1458    AFP       Group B Strep       GBS Susceptibility to  Clindamycin       GBS Susceptibility to Erythromycin       Fetal Fibronectin       Genetic Testing, Maternal Blood             Drug Screening     Test Value Date Time    Urine Drug Screen       Amphetamine Screen       Barbiturate Screen       Benzodiazepine Screen       Methadone Screen       Phencyclidine Screen       Opiates Screen       THC Screen       Cocaine Screen       Propoxyphene Screen       Buprenorphine Screen       Methamphetamine Screen       Oxycodone Screen       Tricyclic Antidepressants Screen             Legend    ^: Historical                        Discharge Disposition Home or Self Care   Condition on Discharge: good   Follow-up: 2 weeks with Dr. Roe   Plan for discharge with Dr. Olson.    This note has been signed electronically.    Shanice Bledsoe, DNP, APRN, CNM, RNC-OB  12/22/2022

## 2022-12-22 NOTE — PLAN OF CARE
Goal Outcome Evaluation:              Outcome Evaluation: VSS; FFML u1 with scant lochia. No clots noted. Voiding and ambulating without difficulty. Visiting infant in NICU often. Rested well between care. Pain well controlled with PO pain meds. ALT inc with steristrips CDI. No drainage noted.

## 2022-12-22 NOTE — DISCHARGE INSTR - APPOINTMENTS
You have an appointment for 1/5/23 at 1130 with Dr Roe for an incision check.  If you are unable to make this appointment please call her office at [phone].

## 2022-12-22 NOTE — PLAN OF CARE
Goal Outcome Evaluation:              Outcome Evaluation: Discharged home in stable condition, no s/s of distress noted.

## 2022-12-23 LAB
BH BB BLOOD EXPIRATION DATE: NORMAL
BH BB BLOOD EXPIRATION DATE: NORMAL
BH BB BLOOD TYPE BARCODE: 9500
BH BB BLOOD TYPE BARCODE: 9500
BH BB DISPENSE STATUS: NORMAL
BH BB DISPENSE STATUS: NORMAL
BH BB PRODUCT CODE: NORMAL
BH BB PRODUCT CODE: NORMAL
BH BB UNIT NUMBER: NORMAL
BH BB UNIT NUMBER: NORMAL
CROSSMATCH INTERPRETATION: NORMAL
CROSSMATCH INTERPRETATION: NORMAL
UNIT  ABO: NORMAL
UNIT  ABO: NORMAL
UNIT  RH: NORMAL
UNIT  RH: NORMAL

## 2023-01-02 LAB
CYTO UR: NORMAL
LAB AP CASE REPORT: NORMAL
Lab: NORMAL
PATH REPORT.FINAL DX SPEC: NORMAL
PATH REPORT.GROSS SPEC: NORMAL

## 2023-01-05 ENCOUNTER — POSTPARTUM VISIT (OUTPATIENT)
Dept: OBSTETRICS AND GYNECOLOGY | Facility: CLINIC | Age: 33
End: 2023-01-05
Payer: COMMERCIAL

## 2023-01-05 VITALS
SYSTOLIC BLOOD PRESSURE: 112 MMHG | DIASTOLIC BLOOD PRESSURE: 78 MMHG | WEIGHT: 162 LBS | HEIGHT: 63 IN | BODY MASS INDEX: 28.7 KG/M2

## 2023-01-05 DIAGNOSIS — Z09 POSTOP CHECK: Primary | ICD-10-CM

## 2023-01-05 PROCEDURE — 0503F POSTPARTUM CARE VISIT: CPT | Performed by: OBSTETRICS & GYNECOLOGY

## 2023-01-05 RX ORDER — PEN NEEDLE, DIABETIC 32GX 5/32"
NEEDLE, DISPOSABLE MISCELLANEOUS
COMMUNITY
Start: 2022-12-21

## 2023-01-05 NOTE — PROGRESS NOTES
Subjective   Cassandra Del Cid is a 32 y.o. female.     Chief Complaint   Patient presents with   • Postpartum Care     Patient here 2 weeks post op/postpartum  done  at 29 weeks. Baby is still in NICU doing well, baby girl Yoana. Patient is pumping currently. Patient states incision is doing well, minor itching. Denies problems with PPD.        History of Present Illness  32-year-old female  3 para 2-1-0-3 presents for postoperative visit from a primary  section on 2022.  Patient reports overall she is doing well.  She reports minimal pain.  She denies any issues with her mood.  She is currently pumping.  She voices no other complaints or concerns at this time.   Answers for HPI/ROS submitted by the patient on 2022  Please describe your symptoms.: C section incision check up  Have you had these symptoms before?: No  How long have you been having these symptoms?: 1-2 weeks  What is the primary reason for your visit?: Other    Review of Systems   Constitutional: Negative for chills and fever.     Objective   /78   Ht 160 cm (63\")   Wt 73.5 kg (162 lb)   Breastfeeding Yes Comment: pumping  BMI 28.70 kg/m²   No LMP recorded.  Physical Exam  Vitals and nursing note reviewed.   Constitutional:       General: She is not in acute distress.     Appearance: She is well-developed.   HENT:      Head: Normocephalic and atraumatic.   Neck:      Thyroid: No thyromegaly.   Pulmonary:      Effort: Pulmonary effort is normal.   Abdominal:      General: There is no distension.      Palpations: Abdomen is soft.      Tenderness: There is no abdominal tenderness. There is no guarding or rebound.      Comments: Incision clean dry and intact well healed   Musculoskeletal:         General: Normal range of motion.      Cervical back: Normal range of motion and neck supple.   Skin:     General: Skin is warm and dry.   Neurological:      Mental Status: She is alert and oriented to  person, place, and time.   Psychiatric:         Behavior: Behavior normal.         Judgment: Judgment normal.       Assessment & Plan   Problems Addressed this Visit    None  Visit Diagnoses     Postop check    -  Primary    Postpartum state          Diagnoses       Codes Comments    Postop check    -  Primary ICD-10-CM: Z09  ICD-9-CM: V67.00     Postpartum state     ICD-10-CM: Z39.2  ICD-9-CM: V24.2       Return to clinic in 4 weeks for follow-up or sooner symptoms worsen.  Discussed postoperative restrictions.  All questions answered patient verbalized understanding of plan.       June Roe, DO

## 2023-02-02 ENCOUNTER — POSTPARTUM VISIT (OUTPATIENT)
Dept: OBSTETRICS AND GYNECOLOGY | Facility: CLINIC | Age: 33
End: 2023-02-02
Payer: COMMERCIAL

## 2023-02-02 VITALS
HEIGHT: 63 IN | SYSTOLIC BLOOD PRESSURE: 116 MMHG | BODY MASS INDEX: 28.7 KG/M2 | WEIGHT: 162 LBS | DIASTOLIC BLOOD PRESSURE: 78 MMHG

## 2023-02-02 PROCEDURE — 0503F POSTPARTUM CARE VISIT: CPT | Performed by: OBSTETRICS & GYNECOLOGY

## 2023-02-02 RX ORDER — ACETAMINOPHEN AND CODEINE PHOSPHATE 120; 12 MG/5ML; MG/5ML
1 SOLUTION ORAL DAILY
Qty: 28 TABLET | Refills: 3 | Status: SHIPPED | OUTPATIENT
Start: 2023-02-02 | End: 2023-05-25

## 2023-02-02 NOTE — PROGRESS NOTES
"Subjective   Cassandra Del Cid is a 32 y.o. female.     Chief Complaint   Patient presents with   • Postpartum Care     Patient here 6 weeks postpartum  done 22. Baby girl Yoana is still in the NICU, per patient baby is doing well. Patient is breastfeeding/pumping. Patient would like to discuss birth control options today.      History of Present Illness  32-year-old female  3 para 2-1-0-3 presents for postpartum visit.  Patient reports overall she is doing well.  Her pain is well controlled.  She denies any vaginal bleeding.  Her mood is also controlled.  Her baby is in the NICU at this time.  She is pumping and breast-feeding at this time.  She is interested in contraceptive management options.       Answers for HPI/ROS submitted by the patient on 2023  Please describe your symptoms.: 6 week follow up  Have you had these symptoms before?: No  How long have you been having these symptoms?: Greater than 2 weeks  What is the primary reason for your visit?: Other    Review of Systems   Genitourinary: Negative for vaginal bleeding.       Objective   /78   Ht 160 cm (63\")   Wt 73.5 kg (162 lb)   Breastfeeding Yes Comment: pumping  BMI 28.70 kg/m²   No LMP recorded.  Physical Exam  Vitals and nursing note reviewed.   Constitutional:       General: She is not in acute distress.     Appearance: She is well-developed.   HENT:      Head: Normocephalic and atraumatic.   Eyes:      Conjunctiva/sclera: Conjunctivae normal.   Pulmonary:      Effort: Pulmonary effort is normal.   Abdominal:      General: There is no distension.      Palpations: Abdomen is soft.      Tenderness: There is no abdominal tenderness. There is no guarding or rebound.      Comments: Incision clean dry and intact well healed.    Musculoskeletal:         General: Normal range of motion.      Cervical back: Normal range of motion and neck supple.   Skin:     General: Skin is warm and dry.   Neurological:      Mental " Status: She is alert and oriented to person, place, and time.   Psychiatric:         Behavior: Behavior normal.         Judgment: Judgment normal.           Assessment & Plan   Problems Addressed this Visit    None  Visit Diagnoses     Postpartum state    -  Primary      Diagnoses       Codes Comments    Postpartum state    -  Primary ICD-10-CM: Z39.2  ICD-9-CM: V24.2       Patient interested in birth control pills.  Sent Micronor.  Discussed with patient appropriate use.  Return to clinic in 3 months for follow-up or sooner symptoms worsen.  All questions answered patient verbalized understanding of plan.       June Roe, DO

## 2023-04-13 ENCOUNTER — OFFICE VISIT (OUTPATIENT)
Dept: OBSTETRICS AND GYNECOLOGY | Facility: CLINIC | Age: 33
End: 2023-04-13
Payer: COMMERCIAL

## 2023-04-13 VITALS
WEIGHT: 159 LBS | BODY MASS INDEX: 28.17 KG/M2 | HEIGHT: 63 IN | SYSTOLIC BLOOD PRESSURE: 114 MMHG | DIASTOLIC BLOOD PRESSURE: 68 MMHG

## 2023-04-13 DIAGNOSIS — Z30.41 SURVEILLANCE OF CONTRACEPTIVE PILL: Primary | ICD-10-CM

## 2023-04-13 RX ORDER — ACETAMINOPHEN AND CODEINE PHOSPHATE 120; 12 MG/5ML; MG/5ML
1 SOLUTION ORAL DAILY
Qty: 28 TABLET | Refills: 9 | Status: SHIPPED | OUTPATIENT
Start: 2023-04-13 | End: 2023-08-03

## 2023-04-13 NOTE — PROGRESS NOTES
"Subjective   Cassandra Del Cid is a 32 y.o. female.     Chief Complaint   Patient presents with   • Follow-up     Patient here for 3 month follow up on OCP. Patient states she is doing well with current OCP. Patient needs refills sent to pharmacy. Patient it still breastfeeding.      History of Present Illness  36-year-old female  3 para 2 presents at that time presents for follow-up.  Patient reports overall she is doing well.  She denies any issues with her mood.  She is currently breast-feeding.  Her last Pap smear was in 2021 was negative.  She voices no other new complaints or concerns at this time.     Review of Systems   Genitourinary: Negative for vaginal bleeding.     Objective   /68   Ht 160 cm (63\")   Wt 72.1 kg (159 lb)   Breastfeeding Yes   BMI 28.17 kg/m²   No LMP recorded. (Menstrual status: Oral contraceptives).  Physical Exam  Vitals and nursing note reviewed.   Constitutional:       General: She is not in acute distress.     Appearance: She is well-developed.   HENT:      Head: Normocephalic and atraumatic.   Eyes:      Conjunctiva/sclera: Conjunctivae normal.   Pulmonary:      Effort: Pulmonary effort is normal.   Musculoskeletal:         General: Normal range of motion.      Cervical back: Normal range of motion and neck supple.   Skin:     General: Skin is warm and dry.   Neurological:      Mental Status: She is alert and oriented to person, place, and time.   Psychiatric:         Behavior: Behavior normal.         Judgment: Judgment normal.       Assessment & Plan   Problems Addressed this Visit    None  Visit Diagnoses     Surveillance of contraceptive pill    -  Primary      Diagnoses       Codes Comments    Surveillance of contraceptive pill    -  Primary ICD-10-CM: Z30.41  ICD-9-CM: V25.41       Refill of Micronor sent.  Discussed with patient that she quits breast-feeding she will need to return to clinic to be switched to a more effective option.  All questions " answered patient.       June Roe, DO

## 2023-05-23 ENCOUNTER — HOSPITAL ENCOUNTER (EMERGENCY)
Age: 33
Discharge: HOME | End: 2023-05-23
Payer: COMMERCIAL

## 2023-05-23 VITALS
WEIGHT: 158 LBS | SYSTOLIC BLOOD PRESSURE: 128 MMHG | TEMPERATURE: 98.4 F | RESPIRATION RATE: 16 BRPM | HEART RATE: 73 BPM | DIASTOLIC BLOOD PRESSURE: 88 MMHG | OXYGEN SATURATION: 100 % | BODY MASS INDEX: 27.99 KG/M2

## 2023-05-23 DIAGNOSIS — N39.0 URINARY TRACT INFECTION WITHOUT HEMATURIA, SITE UNSPECIFIED: ICD-10-CM

## 2023-05-23 DIAGNOSIS — N93.9 VAGINAL BLEEDING: Primary | ICD-10-CM

## 2023-05-23 LAB
ALBUMIN SERPL-MCNC: 4.5 G/DL (ref 3.5–5.2)
ALP SERPL-CCNC: 91 U/L (ref 35–104)
ALT SERPL-CCNC: 19 U/L (ref 5–33)
ANION GAP SERPL CALCULATED.3IONS-SCNC: 8 MMOL/L (ref 7–19)
AST SERPL-CCNC: 17 U/L (ref 5–32)
BACTERIA URNS QL MICRO: NEGATIVE /HPF
BASOPHILS # BLD: 0 K/UL (ref 0–0.2)
BASOPHILS NFR BLD: 0.7 % (ref 0–1)
BILIRUB SERPL-MCNC: 0.3 MG/DL (ref 0.2–1.2)
BILIRUB UR QL STRIP: NEGATIVE
BUN SERPL-MCNC: 11 MG/DL (ref 6–20)
CALCIUM SERPL-MCNC: 9.1 MG/DL (ref 8.6–10)
CHLORIDE SERPL-SCNC: 106 MMOL/L (ref 98–111)
CLARITY UR: ABNORMAL
CO2 SERPL-SCNC: 27 MMOL/L (ref 22–29)
COLOR UR: ABNORMAL
CREAT SERPL-MCNC: 0.6 MG/DL (ref 0.5–0.9)
CRYSTALS URNS MICRO: ABNORMAL /HPF
EOSINOPHIL # BLD: 0.1 K/UL (ref 0–0.6)
EOSINOPHIL NFR BLD: 0.9 % (ref 0–5)
EPI CELLS #/AREA URNS AUTO: 3 /HPF (ref 0–5)
ERYTHROCYTE [DISTWIDTH] IN BLOOD BY AUTOMATED COUNT: 12.5 % (ref 11.5–14.5)
GLUCOSE SERPL-MCNC: 111 MG/DL (ref 74–109)
GLUCOSE UR STRIP.AUTO-MCNC: NEGATIVE MG/DL
HCG SERPL QL: NEGATIVE
HCT VFR BLD AUTO: 39.7 % (ref 37–47)
HGB BLD-MCNC: 13.3 G/DL (ref 12–16)
HGB UR STRIP.AUTO-MCNC: ABNORMAL MG/L
HYALINE CASTS #/AREA URNS AUTO: 2 /HPF (ref 0–8)
IMM GRANULOCYTES # BLD: 0 K/UL
KETONES UR STRIP.AUTO-MCNC: NEGATIVE MG/DL
LEUKOCYTE ESTERASE UR QL STRIP.AUTO: ABNORMAL
LYMPHOCYTES # BLD: 1.3 K/UL (ref 1.1–4.5)
LYMPHOCYTES NFR BLD: 22.9 % (ref 20–40)
MCH RBC QN AUTO: 32.2 PG (ref 27–31)
MCHC RBC AUTO-ENTMCNC: 33.5 G/DL (ref 33–37)
MCV RBC AUTO: 96.1 FL (ref 81–99)
MONOCYTES # BLD: 0.4 K/UL (ref 0–0.9)
MONOCYTES NFR BLD: 7.5 % (ref 0–10)
NEUTROPHILS # BLD: 3.8 K/UL (ref 1.5–7.5)
NEUTS SEG NFR BLD: 67.6 % (ref 50–65)
NITRITE UR QL STRIP.AUTO: NEGATIVE
PH UR STRIP.AUTO: 6.5 [PH] (ref 5–8)
PLATELET # BLD AUTO: 252 K/UL (ref 130–400)
PMV BLD AUTO: 9.5 FL (ref 9.4–12.3)
POTASSIUM SERPL-SCNC: 4.2 MMOL/L (ref 3.5–5)
PROT SERPL-MCNC: 7 G/DL (ref 6.6–8.7)
PROT UR STRIP.AUTO-MCNC: ABNORMAL MG/DL
RBC # BLD AUTO: 4.13 M/UL (ref 4.2–5.4)
RBC #/AREA URNS AUTO: 814 /HPF (ref 0–4)
SODIUM SERPL-SCNC: 141 MMOL/L (ref 136–145)
SP GR UR STRIP.AUTO: 1.01 (ref 1–1.03)
UROBILINOGEN UR STRIP.AUTO-MCNC: 1 E.U./DL
WBC # BLD AUTO: 5.6 K/UL (ref 4.8–10.8)
WBC #/AREA URNS AUTO: 62 /HPF (ref 0–5)

## 2023-05-23 PROCEDURE — 81001 URINALYSIS AUTO W/SCOPE: CPT

## 2023-05-23 PROCEDURE — 36415 COLL VENOUS BLD VENIPUNCTURE: CPT

## 2023-05-23 PROCEDURE — 87086 URINE CULTURE/COLONY COUNT: CPT

## 2023-05-23 PROCEDURE — 85025 COMPLETE CBC W/AUTO DIFF WBC: CPT

## 2023-05-23 PROCEDURE — 80053 COMPREHEN METABOLIC PANEL: CPT

## 2023-05-23 PROCEDURE — 84703 CHORIONIC GONADOTROPIN ASSAY: CPT

## 2023-05-23 PROCEDURE — 99283 EMERGENCY DEPT VISIT LOW MDM: CPT

## 2023-05-23 RX ORDER — CEPHALEXIN 500 MG/1
500 CAPSULE ORAL 2 TIMES DAILY
Qty: 14 CAPSULE | Refills: 0 | Status: SHIPPED | OUTPATIENT
Start: 2023-05-23 | End: 2023-05-30

## 2023-05-23 ASSESSMENT — ENCOUNTER SYMPTOMS
PHOTOPHOBIA: 0
COLOR CHANGE: 0
NAUSEA: 0
ABDOMINAL DISTENTION: 0
APNEA: 0
SORE THROAT: 0
EYE DISCHARGE: 0
ABDOMINAL PAIN: 0
RHINORRHEA: 0
COUGH: 0
EYE PAIN: 0
BACK PAIN: 0
SHORTNESS OF BREATH: 0

## 2023-05-23 ASSESSMENT — PAIN SCALES - GENERAL: PAINLEVEL_OUTOF10: 1

## 2023-05-23 ASSESSMENT — LIFESTYLE VARIABLES: HOW OFTEN DO YOU HAVE A DRINK CONTAINING ALCOHOL: NEVER

## 2023-05-23 ASSESSMENT — PAIN - FUNCTIONAL ASSESSMENT: PAIN_FUNCTIONAL_ASSESSMENT: 0-10

## 2023-05-23 NOTE — ED PROVIDER NOTES
[address] [facility] [place] EMERGENCY DEPT  eMERGENCYdEPARTMENT eNCOUnter      Pt Name: Justice Castaneda  MRN: [id number]  [place] 1990  Date of evaluation: 5/23/2023  Provider:TAMARA Vines    CHIEF COMPLAINT       Chief Complaint   Patient presents with    Vaginal Bleeding     Pt reports excessive vaginal bleeding x 2 days with clots. Pt is breastfeeding         HISTORY OF PRESENT ILLNESS  (Location/Symptom, Timing/Onset, Context/Setting, Quality, Duration, Modifying Factors, Severity.)   Justice Castaneda is a 29 y.o. female who presents to the emergency department with complaints of excessive vaginal bleeding. She is due for her period had placenta brevia with her pregnancy her daughter is 5 mo. She is followed by Dr Eddie Brito with OB. She has baseline anemia hx. She started bleeding around 12 today. She tells me she went through 4 pads but pain resolving as is bleeding. No other complaints. HPI    Nursing Notes were reviewed and I agree. REVIEW OF SYSTEMS    (2-9 systems for level 4, 10 or more for level 5)     Review of Systems   Constitutional:  Negative for activity change, appetite change, chills and fever. HENT:  Negative for congestion, postnasal drip, rhinorrhea and sore throat. Eyes:  Negative for photophobia, pain, discharge and visual disturbance. Respiratory:  Negative for apnea, cough and shortness of breath. Cardiovascular:  Negative for chest pain and leg swelling. Gastrointestinal:  Negative for abdominal distention, abdominal pain and nausea. Genitourinary:  Positive for vaginal bleeding. Musculoskeletal:  Negative for arthralgias, back pain, joint swelling, neck pain and neck stiffness. Skin:  Negative for color change and rash. Neurological:  Negative for dizziness, syncope, facial asymmetry and headaches. Hematological:  Negative for adenopathy. Does not bruise/bleed easily. Psychiatric/Behavioral:  Negative for agitation, behavioral problems and confusion.        Except as noted

## 2023-05-24 NOTE — ED PROVIDER NOTES
[address] [facility] EMERGENCY DEPT  eMERGENCYdEPARTMENT eNCOUnter      Pt Name: Stew Albert  MRN: [id number]  Birthdate 1990of evaluation: 5/23/2023  [address] [place], [place]    Emergency Department care of this patient was assumed at 2000 from Marilyne Scheuermann, PA-C. We have discussed the case and the plan of care. I have seen and evaluated patient and reviewed ED course. CHIEF COMPLAINT       Chief Complaint   Patient presents with    Vaginal Bleeding     Pt reports excessive vaginal bleeding x 2 days with clots. Pt is breastfeeding     Patient is a 29year old female who is 5 months post partum who presented to the ED with vaginal bleeding. The patient noted it had been excessive over the last 2 days with multiple clots. She was originally seen and workup started by Marilyne Scheuermann, PA-C. He notes that the bleeding had significantly lightened and she had no tenderness warranting pelvic US. She had no leukocytosis or anemia on CBC. CMP neg for electrolyte abnormality, PERRI, or LFT elevation. HCG negative. I took over the patient's care with plan to follow up on UA. PHYSICAL EXAM    (up to 7 for level 4, 8 or more for level 5)     ED Triage Vitals   BP Temp Temp src Pulse Respirations SpO2 Height Weight - Scale   05/23/23 1637 05/23/23 1637 -- 05/23/23 1637 05/23/23 1637 05/23/23 1637 -- 05/23/23 1639   128/88 98.4 °F (36.9 °C)  73 16 100 %  158 lb (71.7 kg)       Physical Exam  Vitals and nursing note reviewed. Constitutional:       General: She is not in acute distress. Appearance: Normal appearance. She is not ill-appearing, toxic-appearing or diaphoretic. Cardiovascular:      Rate and Rhythm: Normal rate and regular rhythm. Pulses: Normal pulses. Pulmonary:      Effort: Pulmonary effort is normal. No respiratory distress. Musculoskeletal:      Cervical back: Normal range of motion and neck supple. No rigidity or tenderness. Lymphadenopathy:      Cervical: No cervical adenopathy.    Skin:

## 2023-05-25 LAB — BACTERIA UR CULT: NORMAL

## 2023-06-23 ENCOUNTER — HOSPITAL ENCOUNTER (OUTPATIENT)
Dept: ULTRASOUND IMAGING | Age: 33
Discharge: HOME | End: 2023-06-23
Payer: COMMERCIAL

## 2023-06-23 DIAGNOSIS — N92.0 MENORRHAGIA WITH REGULAR CYCLE: ICD-10-CM

## 2023-06-23 DIAGNOSIS — N94.6 DYSMENORRHEA: ICD-10-CM

## 2023-06-23 PROCEDURE — 76830 TRANSVAGINAL US NON-OB: CPT

## 2024-05-27 SDOH — ECONOMIC STABILITY: HOUSING INSECURITY
IN THE LAST 12 MONTHS, WAS THERE A TIME WHEN YOU DID NOT HAVE A STEADY PLACE TO SLEEP OR SLEPT IN A SHELTER (INCLUDING NOW)?: NO

## 2024-05-27 SDOH — ECONOMIC STABILITY: INCOME INSECURITY: HOW HARD IS IT FOR YOU TO PAY FOR THE VERY BASICS LIKE FOOD, HOUSING, MEDICAL CARE, AND HEATING?: NOT HARD AT ALL

## 2024-05-27 SDOH — ECONOMIC STABILITY: FOOD INSECURITY: WITHIN THE PAST 12 MONTHS, THE FOOD YOU BOUGHT JUST DIDN'T LAST AND YOU DIDN'T HAVE MONEY TO GET MORE.: NEVER TRUE

## 2024-05-27 SDOH — ECONOMIC STABILITY: FOOD INSECURITY: WITHIN THE PAST 12 MONTHS, YOU WORRIED THAT YOUR FOOD WOULD RUN OUT BEFORE YOU GOT MONEY TO BUY MORE.: NEVER TRUE

## 2024-05-27 SDOH — ECONOMIC STABILITY: TRANSPORTATION INSECURITY
IN THE PAST 12 MONTHS, HAS LACK OF TRANSPORTATION KEPT YOU FROM MEETINGS, WORK, OR FROM GETTING THINGS NEEDED FOR DAILY LIVING?: NO

## 2024-05-27 ASSESSMENT — PATIENT HEALTH QUESTIONNAIRE - PHQ9
SUM OF ALL RESPONSES TO PHQ QUESTIONS 1-9: 0
2. FEELING DOWN, DEPRESSED OR HOPELESS: NOT AT ALL
SUM OF ALL RESPONSES TO PHQ9 QUESTIONS 1 & 2: 0
SUM OF ALL RESPONSES TO PHQ QUESTIONS 1-9: 0
SUM OF ALL RESPONSES TO PHQ QUESTIONS 1-9: 0
1. LITTLE INTEREST OR PLEASURE IN DOING THINGS: NOT AT ALL
SUM OF ALL RESPONSES TO PHQ9 QUESTIONS 1 & 2: 0
2. FEELING DOWN, DEPRESSED OR HOPELESS: NOT AT ALL
1. LITTLE INTEREST OR PLEASURE IN DOING THINGS: NOT AT ALL
SUM OF ALL RESPONSES TO PHQ QUESTIONS 1-9: 0

## 2024-05-29 ENCOUNTER — OFFICE VISIT (OUTPATIENT)
Dept: OBGYN CLINIC | Age: 34
End: 2024-05-29
Payer: COMMERCIAL

## 2024-05-29 VITALS
SYSTOLIC BLOOD PRESSURE: 108 MMHG | WEIGHT: 177.6 LBS | DIASTOLIC BLOOD PRESSURE: 75 MMHG | BODY MASS INDEX: 32.68 KG/M2 | HEIGHT: 62 IN | HEART RATE: 65 BPM

## 2024-05-29 DIAGNOSIS — R45.86 MOOD SWINGS: ICD-10-CM

## 2024-05-29 DIAGNOSIS — N93.9 ABNORMAL UTERINE BLEEDING (AUB): ICD-10-CM

## 2024-05-29 DIAGNOSIS — R10.2 PELVIC CRAMPING: ICD-10-CM

## 2024-05-29 DIAGNOSIS — N93.9 ABNORMAL UTERINE BLEEDING (AUB): Primary | ICD-10-CM

## 2024-05-29 PROBLEM — O36.1910: Status: RESOLVED | Noted: 2022-08-02 | Resolved: 2024-05-29

## 2024-05-29 PROBLEM — Z3A.29 29 WEEKS GESTATION OF PREGNANCY: Status: RESOLVED | Noted: 2022-12-07 | Resolved: 2024-05-29

## 2024-05-29 LAB
ALBUMIN SERPL-MCNC: 4.4 G/DL (ref 3.5–5.2)
ALP SERPL-CCNC: 76 U/L (ref 35–104)
ALT SERPL-CCNC: 12 U/L (ref 5–33)
ANION GAP SERPL CALCULATED.3IONS-SCNC: 16 MMOL/L (ref 7–19)
AST SERPL-CCNC: 13 U/L (ref 5–32)
BASOPHILS # BLD: 0 K/UL (ref 0–0.2)
BASOPHILS NFR BLD: 0.8 % (ref 0–1)
BILIRUB SERPL-MCNC: 0.5 MG/DL (ref 0.2–1.2)
BUN SERPL-MCNC: 13 MG/DL (ref 6–20)
CALCIUM SERPL-MCNC: 8.9 MG/DL (ref 8.6–10)
CHLORIDE SERPL-SCNC: 107 MMOL/L (ref 98–111)
CO2 SERPL-SCNC: 21 MMOL/L (ref 22–29)
CREAT SERPL-MCNC: 0.7 MG/DL (ref 0.5–0.9)
EOSINOPHIL # BLD: 0.1 K/UL (ref 0–0.6)
EOSINOPHIL NFR BLD: 1.4 % (ref 0–5)
ERYTHROCYTE [DISTWIDTH] IN BLOOD BY AUTOMATED COUNT: 11.8 % (ref 11.5–14.5)
GLUCOSE SERPL-MCNC: 86 MG/DL (ref 74–109)
GONADOTROPIN, CHORIONIC (HCG) QUANT: 0.1 MIU/ML (ref 0–5.3)
HCT VFR BLD AUTO: 39 % (ref 37–47)
HGB BLD-MCNC: 13.3 G/DL (ref 12–16)
IMM GRANULOCYTES # BLD: 0 K/UL
LYMPHOCYTES # BLD: 1 K/UL (ref 1.1–4.5)
LYMPHOCYTES NFR BLD: 21 % (ref 20–40)
MCH RBC QN AUTO: 31.9 PG (ref 27–31)
MCHC RBC AUTO-ENTMCNC: 34.1 G/DL (ref 33–37)
MCV RBC AUTO: 93.5 FL (ref 81–99)
MONOCYTES # BLD: 0.3 K/UL (ref 0–0.9)
MONOCYTES NFR BLD: 6.4 % (ref 0–10)
NEUTROPHILS # BLD: 3.4 K/UL (ref 1.5–7.5)
NEUTS SEG NFR BLD: 70.2 % (ref 50–65)
PLATELET # BLD AUTO: 286 K/UL (ref 130–400)
PMV BLD AUTO: 10.1 FL (ref 9.4–12.3)
POTASSIUM SERPL-SCNC: 3.9 MMOL/L (ref 3.5–5)
PROT SERPL-MCNC: 7.4 G/DL (ref 6.6–8.7)
RBC # BLD AUTO: 4.17 M/UL (ref 4.2–5.4)
SODIUM SERPL-SCNC: 144 MMOL/L (ref 136–145)
T4 FREE SERPL-MCNC: 1.11 NG/DL (ref 0.93–1.7)
TESTOST SERPL-MCNC: 26.1 NG/DL (ref 8.4–48.1)
TSH SERPL DL<=0.005 MIU/L-ACNC: 1.38 UIU/ML (ref 0.27–4.2)
WBC # BLD AUTO: 4.9 K/UL (ref 4.8–10.8)

## 2024-05-29 PROCEDURE — 99214 OFFICE O/P EST MOD 30 MIN: CPT | Performed by: NURSE PRACTITIONER

## 2024-05-29 ASSESSMENT — ENCOUNTER SYMPTOMS
EYES NEGATIVE: 1
ALLERGIC/IMMUNOLOGIC NEGATIVE: 1
RESPIRATORY NEGATIVE: 1
GASTROINTESTINAL NEGATIVE: 1

## 2024-05-29 NOTE — PROGRESS NOTES
Gabi Kohler is a 33 y.o. female who presents today for her medical conditions/ complaints as noted below. Gabi Kohler is c/o of Vaginal Bleeding        HPI  Pt presents today with c/o: Pt states before this period she hadn't had one since March. At the beginning of May she started having sx of a period and she spotted and then it went away. Pt states that happens several times for weeks until full blown periods. Pt also states she is having extreme period symptoms; mood swings and cramping all month long. Pt is still breastfeeding. Pt doesn't know if her hormones are out of whack because her emotions are all over the place. Pt is open to contraception to manage irregular periods and mood swings.     Is trying to wean baby, 17 months old, not wanting to breast feed anymore  Hx of irregular periods and missing them \"for months\" prior to conceiving. So that isn't necessarily new, but the mood swings are different    Last mammogram:  N/A   Last pap smear: 23   Contraception: N/A (has been on OCP in the past)  :  4  Para:  3  AB:  1     Patient's last menstrual period was 2024 (exact date).      History reviewed. No pertinent past medical history.  Past Surgical History:   Procedure Laterality Date    APPENDECTOMY  10/14/2011    laparoscopic     SECTION       Family History   Problem Relation Age of Onset    Diabetes Paternal Grandfather     Cancer Paternal Grandfather         skin cancers     Social History     Tobacco Use    Smoking status: Never    Smokeless tobacco: Never   Substance Use Topics    Alcohol use: No       No current outpatient medications on file.     No current facility-administered medications for this visit.     Allergies   Allergen Reactions    Penicillins Nausea And Vomiting     Other reaction(s): GI Intolerance  Makes feel bad       Vitals:    24 1018   BP: 108/75   Pulse: 65     Body mass index is 32.48 kg/m².    Review of Systems   Constitutional:

## 2024-05-31 RX ORDER — NORETHINDRONE ACETATE AND ETHINYL ESTRADIOL AND FERROUS FUMARATE 1MG-20(24)
1 KIT ORAL DAILY
Qty: 3 PACKET | Refills: 3 | Status: SHIPPED | OUTPATIENT
Start: 2024-05-31

## 2025-07-23 ENCOUNTER — OFFICE VISIT (OUTPATIENT)
Age: 35
End: 2025-07-23
Payer: COMMERCIAL

## 2025-07-23 VITALS
TEMPERATURE: 97.8 F | RESPIRATION RATE: 18 BRPM | HEIGHT: 64 IN | BODY MASS INDEX: 31.48 KG/M2 | WEIGHT: 184.4 LBS | SYSTOLIC BLOOD PRESSURE: 114 MMHG | OXYGEN SATURATION: 98 % | HEART RATE: 66 BPM | DIASTOLIC BLOOD PRESSURE: 72 MMHG

## 2025-07-23 DIAGNOSIS — Z00.00 WELL ADULT EXAM: Primary | ICD-10-CM

## 2025-07-23 NOTE — PROGRESS NOTES
"        Chief Complaint  Establish Care and Annual Exam    Subjective        Cassandra Del Cdi presents to [facility] for   This is a pleasant 34-year-old female patient who I am familiar with, she was an established patient at [facility] prior to the transition to [facility].  Patient is here today for a wellness visit and to establish care.  Patient is up-to-date on all health maintenance initiatives she sees a OB/GYN provider her Pap smear is up-to-date.  Patient denies any health concerns and reports that overall she is enjoying good health.    Objective   Vital Signs:  /72 (BP Location: Right arm, Patient Position: Sitting, Cuff Size: Adult)   Pulse 66   Temp 97.8 °F (36.6 °C) (Infrared)   Resp 18   Ht 162.6 cm (64\")   Wt 83.6 kg (184 lb 6.4 oz)   SpO2 98%   BMI 31.65 kg/m²   Estimated body mass index is 31.65 kg/m² as calculated from the following:    Height as of this encounter: 162.6 cm (64\").    Weight as of this encounter: 83.6 kg (184 lb 6.4 oz).           Physical Exam  Vitals and nursing note reviewed.   Constitutional:       Appearance: Normal appearance.   HENT:      Head: Normocephalic and atraumatic.      Nose: Nose normal.   Cardiovascular:      Rate and Rhythm: Normal rate and regular rhythm.      Heart sounds: Normal heart sounds.   Pulmonary:      Effort: Pulmonary effort is normal.      Breath sounds: Normal breath sounds.   Abdominal:      General: Bowel sounds are normal.      Palpations: Abdomen is soft.   Musculoskeletal:         General: Normal range of motion.      Cervical back: Normal range of motion and neck supple.   Skin:     General: Skin is warm and dry.   Neurological:      Mental Status: She is alert and oriented to person, place, and time.   Psychiatric:         Mood and Affect: Mood normal.         Behavior: Behavior normal.         Thought Content: Thought content normal.         Judgment: Judgment normal. "            Result Review :                     Assessment and Plan     Diagnoses and all orders for this visit:    1. Well adult exam (Primary)        Counseled on diet and exercise  Counseled on lifestyle modifications  Counseled on vaccines           Follow Up     Return in about 1 year (around 7/23/2026).  Patient was given instructions and counseling regarding her condition or for health maintenance advice. Please see specific information pulled into the AVS if appropriate.

## (undated) DEVICE — SAFEPICO ASPIRATOR ARTERIAL BLOOD SAMPLER. ASPIRATOR ONLY WITH VENTED TIPCAP, 1.5 ML, BOX OF 100 PCS.: Brand: SAFEPICO ASPIRATOR

## (undated) DEVICE — 3M™ STERI-STRIP™ BLEND TONE SKIN CLOSURES, B1557, TAN, 1/2 IN X 4 IN (12MM X 100MM), 6 STRIPS/ENVELOPE: Brand: 3M™ STERI-STRIP™

## (undated) DEVICE — SUT GUT PLAIN TPR PT 2/0 27IN 53T

## (undated) DEVICE — ANTIBACTERIAL VIOLET BRAIDED (POLYGLACTIN 910), SYNTHETIC ABSORBABLE SUTURE: Brand: COATED VICRYL

## (undated) DEVICE — APPL CHLORAPREP HI/LITE 26ML ORNG

## (undated) DEVICE — KENDALL SCD EXPRESS SLEEVES, KNEE LENGTH, LARGE: Brand: KENDALL SCD

## (undated) DEVICE — ADHS LIQ MASTISOL 2/3ML

## (undated) DEVICE — Device

## (undated) DEVICE — ANTIBACTERIAL UNDYED BRAIDED (POLYGLACTIN 910), SYNTHETIC ABSORBABLE SUTURE: Brand: COATED VICRYL

## (undated) DEVICE — 3M™ MEDIPORE™ H SOFT CLOTH SURGICAL TAPE 2868, 8 INCH X 10 YARD (20,3CM X 9,1M), 6 ROLLS/CASE: Brand: 3M™ MEDIPORE™

## (undated) DEVICE — LARGE, DISPOSABLE C-SECTION RETRACTOR: Brand: ALEXIS ® O C-SECTION PROTECTOR/RETRACTOR

## (undated) DEVICE — SUT MNCRYL 0/0 CTX 36IN Y398H

## (undated) DEVICE — PAD C-SECTION: Brand: MEDLINE INDUSTRIES, INC.

## (undated) DEVICE — GLV SURG SENSICARE SLT PF LF 6 STRL